# Patient Record
Sex: FEMALE | Race: WHITE | NOT HISPANIC OR LATINO | Employment: UNEMPLOYED | URBAN - METROPOLITAN AREA
[De-identification: names, ages, dates, MRNs, and addresses within clinical notes are randomized per-mention and may not be internally consistent; named-entity substitution may affect disease eponyms.]

---

## 2017-03-07 ENCOUNTER — ALLSCRIPTS OFFICE VISIT (OUTPATIENT)
Dept: OTHER | Facility: OTHER | Age: 27
End: 2017-03-07

## 2018-01-09 NOTE — MISCELLANEOUS
Provider Comments  Provider Comments:   PT WAS A NO SHOW TODAY      Signatures   Electronically signed by : Kay Ba, ; Dec 21 2016  2:26PM EST                       (Author)

## 2018-01-11 NOTE — RESULT NOTES
Verified Results  (1) THIN PREP PAP WITH IMAGING [id number] 12:00AM Gavin Corrales     Test Name Result Flag Reference   LAB AP CASE REPORT (Report)     Gynecologic Cytology Report            Case: [id number]                  Authorizing Provider: Velma Huerta MD     Collected:      12/13/2016           First Screen:     Tony Zamora, CT Received:      12/16/2016 0955        Rescreen:       Maxwell Walsh, [address]                             Pathologist:      Joon Diaz MD                             Specimen:  LIQUID-BASED PAP, SCREENING, Cervix   LAB AP GYN PRIMARY INTERPRETATION      Epithelial cell abnormality  Electronically signed by Joon Diaz MD on 12/20/2016 at 3:47 PM   LAB AP GYN INTERPRETATION      Atypical squamous cells of undetermined significance  Shift in noelle suggestive of bacterial vaginosis   LAB AP GYN SPECIMEN ADEQUACY      Satisfactory for evaluation  Endocervical/transformation zone component present  LAB AP GYN ADDITIONAL INFORMATION (Report)     [employer]'s FDA approved ,  and ThinPrep Imaging System are   utilized with strict adherence to the 's instruction manual to   prepare gynecologic and non-gynecologic cytology specimens for the   production of ThinPrep slides as well as for gynecologic ThinPrep imaging  These processes have been validated by our laboratory and/or by the     The Pap test is not a diagnostic procedure and should not be used as the   sole means to detect cervical cancer  It is only a screening procedure to   aid in the detection of cervical cancer and its precursors  Both   false-negative and false-positive results have been experienced  Your   patient's test result should be interpreted in this context together with   the history and clinical findings    Interpretation performed at [facility], [address] [place] [address]       Discussion/Summary      Borderline Pap smear will repeat in 1 year    Suggest a possible infection or this may represent colonization  If you have any symptoms return to the office  U did not at the time of the Pap smear

## 2018-01-12 NOTE — MISCELLANEOUS
Provider Comments  Provider Comments:   new pt no showed today      Signatures   Electronically signed by : Fabienne Schultz, ; Dec  5 2016  2:31PM EST                       (Author)

## 2018-01-14 VITALS
BODY MASS INDEX: 29.38 KG/M2 | HEIGHT: 65 IN | SYSTOLIC BLOOD PRESSURE: 100 MMHG | DIASTOLIC BLOOD PRESSURE: 62 MMHG | WEIGHT: 176.38 LBS

## 2018-01-17 NOTE — RESULT NOTES
Verified Results  (1) CHLAMYDIA/GC AMPLIFIED DNA, PCR [id number] 12:00AM Katya Luria     Test Name Result Flag Reference   CHLAMYDIA,AMPLIFIED DNA PROBE   C  trachomatis Amplified DNA Negative   C  trachomatis Amplified DNA Negative   N  GONORRHOEAE AMPLIFIED DNA   N  gonorrhoeae Amplified DNA Negative   N  gonorrhoeae Amplified DNA Negative

## 2019-03-12 PROBLEM — Z01.419 ENCOUNTER FOR ANNUAL ROUTINE GYNECOLOGICAL EXAMINATION: Status: ACTIVE | Noted: 2019-03-12

## 2019-03-12 PROBLEM — Z87.42 HISTORY OF ABNORMAL CERVICAL PAPANICOLAOU SMEAR: Status: ACTIVE | Noted: 2019-03-12

## 2019-03-12 PROBLEM — Z12.4 SCREENING FOR CERVICAL CANCER: Status: ACTIVE | Noted: 2019-03-12

## 2019-03-12 NOTE — PROGRESS NOTES
Assessment/Plan:  NGE  BCM- pregnant LMP 1/13, EDC 10/20/19 RTO US  BMI 34- gined 38 #'s since 12/16- alcohol, eating poorly  Depression- never returned 1/17, did not like how she felt on Zoloft or Ovcon 35  Better now  SBE monthly  Pap and Cx's done  Ca 1,000 mg/d with Vit D- has yogurt every day and usually milk  Exercise 3/wk- does         Diagnoses and all orders for this visit:    Encounter for annual routine gynecological examination    Screening for cervical cancer    History of abnormal cervical Papanicolaou smear    Missed period  -     POCT urine HCG    Other orders  -     acetaminophen (TYLENOL) 500 mg tablet; Take 500 mg by mouth every 6 (six) hours as needed for mild pain  -     Prenatal Vit-Fe Fumarate-FA (PRENATAL VITAMIN PO); Take by mouth              Subjective:        Patient ID: Josee Elaine is a 29 y o  female  HPI    The following portions of the patient's history were reviewed and updated as appropriate: She  has a past medical history of GERD (gastroesophageal reflux disease)  Patient Active Problem List    Diagnosis Date Noted    Missed period 03/13/2019    Encounter for annual routine gynecological examination 03/12/2019    History of abnormal cervical Papanicolaou smear 03/12/2019    Screening for cervical cancer 03/12/2019   PMH:   Menarche  G2, P0;[facility] '03, '08  Depression  Obesity (150 at 13, 250 at 23) - Gastric bypass   9/10  Oligomenorrhea  Chlamydia '09   BTB on OC's  Acute Abd Pain, Syncope in the ED, 100 # wt loss- Dx Laparoscopy with Aspiration   L Ovarian Cyst  and Hemoperitoneum blood was up to liver  7/11  Placed on Ortho Evra (never filled due to $), then Nuvaring, and later DPMA (did not like how she felt on this)  She  has a past surgical history that includes Ovarian cyst removal; Appendectomy; and Gastric bypass    Her family history includes Cancer in her maternal grandmother; Heart failure in her maternal grandfather; No Known Problems in her brother, father, mother, paternal grandfather, paternal grandmother, and sister  FH:  MGF- HTN, CAD, CABG, d 80 in his sleep 1/19  MGM- Brain Ca  MGA- Lung Ca     She  reports that she has been smoking  She has never used smokeless tobacco  She reports that she drank alcohol  She reports that she does not use drugs  SH:   Finished phlebotomy school  Engaged, living with her boyfriend of 10/14  Parents were  and her mother moved back home  She smokes 1 cigarette a day over a long period of time  Current Outpatient Medications   Medication Sig Dispense Refill    acetaminophen (TYLENOL) 500 mg tablet Take 500 mg by mouth every 6 (six) hours as needed for mild pain      Prenatal Vit-Fe Fumarate-FA (PRENATAL VITAMIN PO) Take by mouth       No current facility-administered medications for this visit  Current Outpatient Medications on File Prior to Visit   Medication Sig    acetaminophen (TYLENOL) 500 mg tablet Take 500 mg by mouth every 6 (six) hours as needed for mild pain    Prenatal Vit-Fe Fumarate-FA (PRENATAL VITAMIN PO) Take by mouth    [DISCONTINUED] famotidine (PEPCID) 20 mg tablet Take 20 mg by mouth daily  No current facility-administered medications on file prior to visit  She has No Known Allergies       Review of Systems   Constitutional: Negative for activity change, appetite change, fatigue and unexpected weight change  Eyes: Negative for visual disturbance  Respiratory: Negative for cough, chest tightness, shortness of breath and wheezing  Cardiovascular: Negative for chest pain, palpitations and leg swelling  Breast:   Bilateral tenderness, no nipple discharge, masses, or erythema  Gastrointestinal: Positive for constipation  Negative for abdominal distention, abdominal pain, blood in stool, diarrhea, nausea and vomiting  Endocrine: Negative for cold intolerance and heat intolerance     Genitourinary: Negative for decreased urine volume, difficulty urinating, dyspareunia, dysuria, frequency, hematuria, menstrual problem, pelvic pain, urgency, vaginal bleeding, vaginal discharge and vaginal pain  Musculoskeletal: Negative for arthralgias  Skin: Negative for rash  Neurological: Negative for weakness, light-headedness, numbness and headaches  Hematological: Does not bruise/bleed easily  Psychiatric/Behavioral: Negative for agitation, behavioral problems and sleep disturbance  The patient is not nervous/anxious  Objective:    Vitals:    03/13/19 1326   BP: 124/68   BP Location: Right arm   Patient Position: Sitting   Cuff Size: Standard   Pulse: (!) 111   Weight: 92 8 kg (204 lb 9 6 oz)   Height: 5' 5" (1 651 m)            Physical Exam   Constitutional: She is oriented to person, place, and time  She appears well-developed and well-nourished  HENT:   Head: Normocephalic and atraumatic  Eyes: Pupils are equal, round, and reactive to light  Conjunctivae and EOM are normal    Neck: Normal range of motion  Neck supple  No tracheal deviation present  No thyromegaly present  Cardiovascular: Normal rate, regular rhythm and normal heart sounds  No murmur heard  Pulmonary/Chest: Effort normal and breath sounds normal  No respiratory distress  She has no wheezes  Right breast exhibits no inverted nipple, no mass, no nipple discharge, no skin change and no tenderness  Left breast exhibits no inverted nipple, no mass, no nipple discharge, no skin change and no tenderness  No breast tenderness, discharge or bleeding  Breasts are symmetrical    Abdominal: Soft  Bowel sounds are normal  She exhibits no distension and no mass  There is no tenderness  Genitourinary: Vagina normal  Rectal exam shows no external hemorrhoid  No breast tenderness, discharge or bleeding  There is no rash, tenderness or lesion on the right labia  There is no rash, tenderness or lesion on the left labia  Uterus is not deviated, not enlarged and not tender   Cervix exhibits no motion tenderness and no discharge  Right adnexum displays no mass, no tenderness and no fullness  Left adnexum displays no mass, no tenderness and no fullness  Genitourinary Comments: Deep pelvis  Uterus anteverted 8 week size, compatible with LMP  Musculoskeletal: Normal range of motion  Neurological: She is alert and oriented to person, place, and time  Skin: Skin is warm and dry  Psychiatric: She has a normal mood and affect  Her behavior is normal  Judgment and thought content normal    Nursing note and vitals reviewed

## 2019-03-13 ENCOUNTER — ANNUAL EXAM (OUTPATIENT)
Dept: GYNECOLOGY | Facility: CLINIC | Age: 29
End: 2019-03-13
Payer: COMMERCIAL

## 2019-03-13 VITALS
BODY MASS INDEX: 34.09 KG/M2 | SYSTOLIC BLOOD PRESSURE: 124 MMHG | HEIGHT: 65 IN | WEIGHT: 204.6 LBS | HEART RATE: 111 BPM | DIASTOLIC BLOOD PRESSURE: 68 MMHG

## 2019-03-13 DIAGNOSIS — Z01.419 ENCOUNTER FOR ANNUAL ROUTINE GYNECOLOGICAL EXAMINATION: Primary | ICD-10-CM

## 2019-03-13 DIAGNOSIS — N92.6 MISSED PERIOD: ICD-10-CM

## 2019-03-13 DIAGNOSIS — Z87.42 HISTORY OF ABNORMAL CERVICAL PAPANICOLAOU SMEAR: ICD-10-CM

## 2019-03-13 DIAGNOSIS — Z11.3 ROUTINE SCREENING FOR STI (SEXUALLY TRANSMITTED INFECTION): ICD-10-CM

## 2019-03-13 DIAGNOSIS — Z12.4 SCREENING FOR CERVICAL CANCER: ICD-10-CM

## 2019-03-13 LAB — SL AMB POCT URINE HCG: POSITIVE

## 2019-03-13 PROCEDURE — G0145 SCR C/V CYTO,THINLAYER,RESCR: HCPCS | Performed by: PATHOLOGY

## 2019-03-13 PROCEDURE — 99395 PREV VISIT EST AGE 18-39: CPT | Performed by: OBSTETRICS & GYNECOLOGY

## 2019-03-13 PROCEDURE — 87591 N.GONORRHOEAE DNA AMP PROB: CPT | Performed by: OBSTETRICS & GYNECOLOGY

## 2019-03-13 PROCEDURE — 87491 CHLMYD TRACH DNA AMP PROBE: CPT | Performed by: OBSTETRICS & GYNECOLOGY

## 2019-03-13 PROCEDURE — 81025 URINE PREGNANCY TEST: CPT | Performed by: OBSTETRICS & GYNECOLOGY

## 2019-03-13 PROCEDURE — G0124 SCREEN C/V THIN LAYER BY MD: HCPCS | Performed by: PATHOLOGY

## 2019-03-13 RX ORDER — ACETAMINOPHEN 500 MG
500 TABLET ORAL EVERY 6 HOURS PRN
COMMUNITY
End: 2019-10-22 | Stop reason: HOSPADM

## 2019-03-15 LAB
C TRACH DNA SPEC QL NAA+PROBE: NEGATIVE
N GONORRHOEA DNA SPEC QL NAA+PROBE: NEGATIVE

## 2019-03-18 ENCOUNTER — OFFICE VISIT (OUTPATIENT)
Dept: GYNECOLOGY | Facility: CLINIC | Age: 29
End: 2019-03-18
Payer: COMMERCIAL

## 2019-03-18 VITALS
SYSTOLIC BLOOD PRESSURE: 114 MMHG | HEIGHT: 65 IN | BODY MASS INDEX: 34.22 KG/M2 | DIASTOLIC BLOOD PRESSURE: 62 MMHG | WEIGHT: 205.4 LBS

## 2019-03-18 DIAGNOSIS — O36.80X0 PREGNANCY WITH UNCERTAIN FETAL VIABILITY, SINGLE OR UNSPECIFIED FETUS: Primary | ICD-10-CM

## 2019-03-18 PROCEDURE — 76801 OB US < 14 WKS SINGLE FETUS: CPT | Performed by: OBSTETRICS & GYNECOLOGY

## 2019-03-18 NOTE — PROGRESS NOTES
Assessment/Plan:  Scan cw LMP, EDC 10/20/19  L Ovarian cysts  TOSHA 1 wk  PNV's       Diagnoses and all orders for this visit:    Pregnancy with uncertain fetal viability, single or unspecified fetus    BMI 34 0-34 9,adult              Subjective:        Patient ID: Wendy Suarez is a 29 y o  female  HPI    The following portions of the patient's history were reviewed and updated as appropriate: She  has a past medical history of GERD (gastroesophageal reflux disease)  Patient Active Problem List    Diagnosis Date Noted    Pregnancy with uncertain fetal viability 03/18/2019    Missed period 03/13/2019    BMI 34 0-34 9,adult 03/13/2019    Encounter for annual routine gynecological examination 03/12/2019    History of abnormal cervical Papanicolaou smear 03/12/2019    Screening for cervical cancer 03/12/2019     She  has a past surgical history that includes Ovarian cyst removal; Appendectomy; and Gastric bypass  Her family history includes Cancer in her maternal grandmother; Heart failure in her maternal grandfather; No Known Problems in her brother, father, mother, paternal grandfather, paternal grandmother, and sister  She  reports that she has been smoking  She has never used smokeless tobacco  She reports that she drank alcohol  She reports that she does not use drugs  Current Outpatient Medications   Medication Sig Dispense Refill    acetaminophen (TYLENOL) 500 mg tablet Take 500 mg by mouth every 6 (six) hours as needed for mild pain      Prenatal Vit-Fe Fumarate-FA (PRENATAL VITAMIN PO) Take by mouth       No current facility-administered medications for this visit  Current Outpatient Medications on File Prior to Visit   Medication Sig    acetaminophen (TYLENOL) 500 mg tablet Take 500 mg by mouth every 6 (six) hours as needed for mild pain    Prenatal Vit-Fe Fumarate-FA (PRENATAL VITAMIN PO) Take by mouth     No current facility-administered medications on file prior to visit  She has No Known Allergies         Objective:    Vitals:    03/18/19 1339   BP: 114/62   BP Location: Right arm   Patient Position: Sitting   Cuff Size: Standard   Weight: 93 2 kg (205 lb 6 4 oz)   Height: 5' 5" (1 651 m)

## 2019-03-20 LAB
LAB AP GYN PRIMARY INTERPRETATION: ABNORMAL
Lab: ABNORMAL
PATH INTERP SPEC-IMP: ABNORMAL

## 2019-03-21 NOTE — RESULT ENCOUNTER NOTE
Abnormal Pap smear requiring colposcopy  Because of the pregnancy we most likely will not do biopsies  Would take 2 Tylenol beforehand as it can cause some discomfort

## 2019-03-26 ENCOUNTER — LAB (OUTPATIENT)
Dept: LAB | Facility: HOSPITAL | Age: 29
End: 2019-03-26
Payer: COMMERCIAL

## 2019-03-26 ENCOUNTER — INITIAL PRENATAL (OUTPATIENT)
Dept: GYNECOLOGY | Facility: CLINIC | Age: 29
End: 2019-03-26

## 2019-03-26 VITALS
DIASTOLIC BLOOD PRESSURE: 68 MMHG | HEIGHT: 65 IN | BODY MASS INDEX: 34.32 KG/M2 | WEIGHT: 206 LBS | SYSTOLIC BLOOD PRESSURE: 110 MMHG

## 2019-03-26 DIAGNOSIS — Z34.81 SUPERVISION OF NORMAL INTRAUTERINE PREGNANCY IN MULTIGRAVIDA, FIRST TRIMESTER: Primary | ICD-10-CM

## 2019-03-26 DIAGNOSIS — Z34.81 SUPERVISION OF NORMAL INTRAUTERINE PREGNANCY IN MULTIGRAVIDA, FIRST TRIMESTER: ICD-10-CM

## 2019-03-26 LAB
ABO GROUP BLD: NORMAL
BASOPHILS # BLD AUTO: 0.07 THOUSANDS/ΜL (ref 0–0.1)
BASOPHILS NFR BLD AUTO: 1 % (ref 0–1)
BILIRUB UR QL STRIP: NEGATIVE
BLD GP AB SCN SERPL QL: NEGATIVE
CLARITY UR: CLEAR
COLOR UR: YELLOW
EOSINOPHIL # BLD AUTO: 0.13 THOUSAND/ΜL (ref 0–0.61)
EOSINOPHIL NFR BLD AUTO: 1 % (ref 0–6)
ERYTHROCYTE [DISTWIDTH] IN BLOOD BY AUTOMATED COUNT: 17.1 % (ref 11.6–15.1)
GLUCOSE UR STRIP-MCNC: NEGATIVE MG/DL
HCT VFR BLD AUTO: 31.7 % (ref 34.8–46.1)
HGB BLD-MCNC: 9.6 G/DL (ref 11.5–15.4)
HGB UR QL STRIP.AUTO: NEGATIVE
IMM GRANULOCYTES # BLD AUTO: 0.07 THOUSAND/UL (ref 0–0.2)
IMM GRANULOCYTES NFR BLD AUTO: 1 % (ref 0–2)
KETONES UR STRIP-MCNC: NEGATIVE MG/DL
LEUKOCYTE ESTERASE UR QL STRIP: NEGATIVE
LYMPHOCYTES # BLD AUTO: 2.9 THOUSANDS/ΜL (ref 0.6–4.47)
LYMPHOCYTES NFR BLD AUTO: 19 % (ref 14–44)
MCH RBC QN AUTO: 23 PG (ref 26.8–34.3)
MCHC RBC AUTO-ENTMCNC: 30.3 G/DL (ref 31.4–37.4)
MCV RBC AUTO: 76 FL (ref 82–98)
MONOCYTES # BLD AUTO: 0.91 THOUSAND/ΜL (ref 0.17–1.22)
MONOCYTES NFR BLD AUTO: 6 % (ref 4–12)
NEUTROPHILS # BLD AUTO: 11.43 THOUSANDS/ΜL (ref 1.85–7.62)
NEUTS SEG NFR BLD AUTO: 72 % (ref 43–75)
NITRITE UR QL STRIP: NEGATIVE
NRBC BLD AUTO-RTO: 0 /100 WBCS
PH UR STRIP.AUTO: 5.5 [PH]
PLATELET # BLD AUTO: 369 THOUSANDS/UL (ref 149–390)
PMV BLD AUTO: 9.7 FL (ref 8.9–12.7)
PROT UR STRIP-MCNC: NEGATIVE MG/DL
RBC # BLD AUTO: 4.17 MILLION/UL (ref 3.81–5.12)
RH BLD: NEGATIVE
RUBV IGG SERPL IA-ACNC: 40.5 IU/ML
SP GR UR STRIP.AUTO: 1.03 (ref 1–1.03)
SPECIMEN EXPIRATION DATE: NORMAL
UROBILINOGEN UR QL STRIP.AUTO: 0.2 E.U./DL
WBC # BLD AUTO: 15.51 THOUSAND/UL (ref 4.31–10.16)

## 2019-03-26 PROCEDURE — 36415 COLL VENOUS BLD VENIPUNCTURE: CPT

## 2019-03-26 PROCEDURE — 87086 URINE CULTURE/COLONY COUNT: CPT

## 2019-03-26 PROCEDURE — OBC: Performed by: NURSE PRACTITIONER

## 2019-03-26 PROCEDURE — 83020 HEMOGLOBIN ELECTROPHORESIS: CPT

## 2019-03-26 PROCEDURE — 80081 OBSTETRIC PANEL INC HIV TSTG: CPT

## 2019-03-26 PROCEDURE — 81003 URINALYSIS AUTO W/O SCOPE: CPT

## 2019-03-26 NOTE — PROGRESS NOTES
HIV and other prenatal tests : Discussed  Risk factors identified by prenatal history: Discussed  Anticipated course of prenatal care: Discussed  Nutrition counseling; special diet, dietary precautions (mercury, listeriosis): Discussed  Weight gain counseling: Discussed  Toxoplasmosis precautions (cats/raw meat/unwashed vegetables): Discussed  Sexual activity: Discussed  Exercise: Discussed  Influenza vaccine: Discussed  Dental care: Discussed  Environmental/work hazards : Discussed  Avoidance of sauna or hot tubs: Discussed  Teratogens: Discussed  Travel-avoid travel where risk of congenitally transmitted infection(s) is high: Discussed  Tobacco/smoking cessation counseling (ask, advise, assess, assist and arrange): Discussed  Alcohol, illicit/recreational drugs: Discussed  Breastfeeding: Discussed  Screening for aneuploidy: Discussed  Use of any medications (including supplements, vitamins, herbs or OTC drugs): Discussed  Indications for ultrasonography: Discussed  Intimate partner violence: Discussed  Seat belt use: Discussed  Childbirth classes/hospital facilities: Discussed  No history of MRSA  Not employed   Had chicken pox as a child  Declines Cystic Fibrosis screening

## 2019-03-27 PROBLEM — Z3A.10 10 WEEKS GESTATION OF PREGNANCY: Status: ACTIVE | Noted: 2019-03-27

## 2019-03-27 PROBLEM — O36.80X0 PREGNANCY WITH UNCERTAIN FETAL VIABILITY: Status: RESOLVED | Noted: 2019-03-18 | Resolved: 2019-03-27

## 2019-03-27 PROBLEM — N92.6 MISSED PERIOD: Status: RESOLVED | Noted: 2019-03-13 | Resolved: 2019-03-27

## 2019-03-27 PROBLEM — R87.611 PAP SMEAR OF CERVIX WITH ASCUS, CANNOT EXCLUDE HGSIL: Status: ACTIVE | Noted: 2019-03-27

## 2019-03-27 LAB
BACTERIA UR CULT: NORMAL
HBV SURFACE AG SER QL: NORMAL
RPR SER QL: NORMAL

## 2019-03-27 NOTE — PROGRESS NOTES
Assessment/Plan:  Colposcopy compatible with LGI L  Recolposcope with biopsies after delivery  RTO 4 wks  Has referral for Sequential screen  Diagnoses and all orders for this visit:    Pap smear of cervix with ASCUS, cannot exclude HGSIL    History of abnormal cervical Papanicolaou smear    10 weeks gestation of pregnancy    BMI 34 0-34 9,adult    Other orders  -     ferrous gluconate (FERGON) 324 mg tablet; Take 324 mg by mouth daily with breakfast              Subjective:        Patient ID: Bianca Russ is a 29 y o  female  The following portions of the patient's history were reviewed and updated as appropriate: She  has a past medical history of GERD (gastroesophageal reflux disease)  Patient Active Problem List    Diagnosis Date Noted    10 weeks gestation of pregnancy 03/27/2019    Pap smear of cervix with ASCUS, cannot exclude HGSIL 03/27/2019    BMI 34 0-34 9,adult 03/13/2019    Encounter for annual routine gynecological examination 03/12/2019    History of abnormal cervical Papanicolaou smear 03/12/2019    Screening for cervical cancer 03/12/2019     She  has a past surgical history that includes Ovarian cyst removal; Appendectomy; and Gastric bypass  Her family history includes Cancer in her maternal grandmother; Heart failure in her maternal grandfather; No Known Problems in her brother, father, mother, paternal grandfather, paternal grandmother, and sister  She  reports that she has quit smoking  She has never used smokeless tobacco  She reports that she drank alcohol  She reports that she does not use drugs    Current Outpatient Medications   Medication Sig Dispense Refill    acetaminophen (TYLENOL) 500 mg tablet Take 500 mg by mouth every 6 (six) hours as needed for mild pain      ferrous gluconate (FERGON) 324 mg tablet Take 324 mg by mouth daily with breakfast      Prenatal Vit-Fe Fumarate-FA (PRENATAL VITAMIN PO) Take by mouth       No current facility-administered medications for this visit  Current Outpatient Medications on File Prior to Visit   Medication Sig    acetaminophen (TYLENOL) 500 mg tablet Take 500 mg by mouth every 6 (six) hours as needed for mild pain    ferrous gluconate (FERGON) 324 mg tablet Take 324 mg by mouth daily with breakfast    Prenatal Vit-Fe Fumarate-FA (PRENATAL VITAMIN PO) Take by mouth     No current facility-administered medications on file prior to visit  She has No Known Allergies           Objective:    Vitals:    03/28/19 1345   BP: 122/76   BP Location: Right arm   Patient Position: Sitting   Cuff Size: Standard   Pulse: (!) 116   Weight: 93 5 kg (206 lb 3 2 oz)   Height: 5' 5" (1 651 m)     Colposcopy  Date/Time: 3/28/2019 2:51 PM  Performed by: Nader Cleveland MD  Authorized by: Nader Cleveland MD     Consent:     Consent obtained:  Verbal and written    Consent given by:  Patient    Procedural risks discussed:  Repeat procedure    Patient questions answered: yes      Patient agrees, verbalizes understanding, and wants to proceed: yes    Pre-procedure:     Pre-procedure timeout performed: yes      Prepped with: acetic acid    Indication:     Indication:  ASC-H  Procedure:     Procedure: Colposcopy only      Under satisfactory analgesia the patient was prepped and draped in the dorsal lithotomy position: yes      [place] speculum was placed in the vagina: yes      Under colposcopic examination the transition zone was seen in entirety: yes      Biopsy(s): no      Specimen to pathology: no    Post-procedure:     Findings: White epithelium      Impression: Low grade cervical dysplasia    Comments:      Patient had an ASCUS Pap smear in December of 2016 and now ASCUS cannot exclude HSIL  Colposcopy compatible with mild to moderate cervical dysplasia  Discussed how this may be sloughed off during vaginal delivery  Would plan on having a colposcopy postpartum

## 2019-03-27 NOTE — RESULT ENCOUNTER NOTE
Some of prenatal labs are back- most her normal but your anemic with a hemoglobin of 9 6  Please take your prenatal vitamins daily make sure they have iron in them  Then at least 8 hours later taken over-the-counter iron tablet with a glass of orange juice

## 2019-03-28 ENCOUNTER — PROCEDURE VISIT (OUTPATIENT)
Dept: GYNECOLOGY | Facility: CLINIC | Age: 29
End: 2019-03-28
Payer: COMMERCIAL

## 2019-03-28 VITALS
HEART RATE: 116 BPM | WEIGHT: 206.2 LBS | HEIGHT: 65 IN | BODY MASS INDEX: 34.35 KG/M2 | DIASTOLIC BLOOD PRESSURE: 76 MMHG | SYSTOLIC BLOOD PRESSURE: 122 MMHG

## 2019-03-28 DIAGNOSIS — R87.611 PAP SMEAR OF CERVIX WITH ASCUS, CANNOT EXCLUDE HGSIL: Primary | ICD-10-CM

## 2019-03-28 DIAGNOSIS — Z3A.10 10 WEEKS GESTATION OF PREGNANCY: ICD-10-CM

## 2019-03-28 DIAGNOSIS — Z87.42 HISTORY OF ABNORMAL CERVICAL PAPANICOLAOU SMEAR: ICD-10-CM

## 2019-03-28 LAB
DEPRECATED HGB OTHER BLD-IMP: 0 %
HGB A MFR BLD: 1.6 % (ref 1.8–3.2)
HGB A MFR BLD: 98.4 % (ref 96.4–98.8)
HGB C MFR BLD: 0 %
HGB F MFR BLD: 0 % (ref 0–2)
HGB FRACT BLD-IMP: ABNORMAL
HGB S BLD QL SOLY: NEGATIVE
HGB S MFR BLD: 0 %
HIV 1+2 AB+HIV1 P24 AG SERPL QL IA: NORMAL

## 2019-03-28 PROCEDURE — 57452 EXAM OF CERVIX W/SCOPE: CPT | Performed by: OBSTETRICS & GYNECOLOGY

## 2019-03-28 RX ORDER — DOXYCYCLINE HYCLATE 50 MG/1
324 CAPSULE, GELATIN COATED ORAL
COMMUNITY
End: 2020-05-19

## 2019-03-28 NOTE — RESULT ENCOUNTER NOTE
Hemoglobin 9 6 which is very low  Please make sure you are taking a prenatal vitamin with iron in it  Also add an iron tablet with a glass of orange juice 8 hours later for the remainder of the pregnancy

## 2019-04-01 NOTE — RESULT ENCOUNTER NOTE
The hemoglobin electrophoresis came back abnormal due to the fact that you have iron deficiency anemia  It is very important to take your prenatal vitamin that has iron as well as a separate iron tablet at least 8 hours later  A glass of orange juice will help absorb it

## 2019-04-08 ENCOUNTER — TELEPHONE (OUTPATIENT)
Dept: GYNECOLOGY | Facility: CLINIC | Age: 29
End: 2019-04-08

## 2019-04-17 ENCOUNTER — ROUTINE PRENATAL (OUTPATIENT)
Dept: PERINATAL CARE | Facility: CLINIC | Age: 29
End: 2019-04-17
Payer: COMMERCIAL

## 2019-04-17 VITALS
WEIGHT: 203 LBS | BODY MASS INDEX: 33.82 KG/M2 | DIASTOLIC BLOOD PRESSURE: 77 MMHG | SYSTOLIC BLOOD PRESSURE: 119 MMHG | HEART RATE: 108 BPM | HEIGHT: 65 IN

## 2019-04-17 DIAGNOSIS — Z34.81 SUPERVISION OF NORMAL INTRAUTERINE PREGNANCY IN MULTIGRAVIDA, FIRST TRIMESTER: ICD-10-CM

## 2019-04-17 DIAGNOSIS — O99.210 OBESITY AFFECTING PREGNANCY, ANTEPARTUM: ICD-10-CM

## 2019-04-17 DIAGNOSIS — Z36.82 ENCOUNTER FOR NUCHAL TRANSLUCENCY TESTING: Primary | ICD-10-CM

## 2019-04-17 DIAGNOSIS — Z3A.13 13 WEEKS GESTATION OF PREGNANCY: ICD-10-CM

## 2019-04-17 PROCEDURE — 99241 PR OFFICE CONSULTATION NEW/ESTAB PATIENT 15 MIN: CPT | Performed by: OBSTETRICS & GYNECOLOGY

## 2019-04-17 PROCEDURE — 76813 OB US NUCHAL MEAS 1 GEST: CPT | Performed by: OBSTETRICS & GYNECOLOGY

## 2019-04-17 RX ORDER — ASPIRIN 81 MG/1
162 TABLET ORAL DAILY
Qty: 180 TABLET | Refills: 3 | Status: SHIPPED | OUTPATIENT
Start: 2019-04-17 | End: 2019-11-11 | Stop reason: ALTCHOICE

## 2019-04-29 ENCOUNTER — ROUTINE PRENATAL (OUTPATIENT)
Dept: GYNECOLOGY | Facility: CLINIC | Age: 29
End: 2019-04-29
Payer: COMMERCIAL

## 2019-04-29 VITALS
BODY MASS INDEX: 34.55 KG/M2 | WEIGHT: 207.4 LBS | SYSTOLIC BLOOD PRESSURE: 120 MMHG | HEART RATE: 110 BPM | DIASTOLIC BLOOD PRESSURE: 70 MMHG | HEIGHT: 65 IN

## 2019-04-29 DIAGNOSIS — O99.012 ANEMIA DURING PREGNANCY IN SECOND TRIMESTER: ICD-10-CM

## 2019-04-29 DIAGNOSIS — Z3A.15 15 WEEKS GESTATION OF PREGNANCY: Primary | ICD-10-CM

## 2019-04-29 DIAGNOSIS — Z23 NEED FOR INFLUENZA VACCINATION: ICD-10-CM

## 2019-04-29 PROCEDURE — 99214 OFFICE O/P EST MOD 30 MIN: CPT | Performed by: NURSE PRACTITIONER

## 2019-04-29 PROCEDURE — 90471 IMMUNIZATION ADMIN: CPT | Performed by: NURSE PRACTITIONER

## 2019-04-29 PROCEDURE — 90686 IIV4 VACC NO PRSV 0.5 ML IM: CPT | Performed by: NURSE PRACTITIONER

## 2019-05-13 ENCOUNTER — APPOINTMENT (OUTPATIENT)
Dept: LAB | Facility: HOSPITAL | Age: 29
End: 2019-05-13
Payer: COMMERCIAL

## 2019-05-13 DIAGNOSIS — O99.012 ANEMIA DURING PREGNANCY IN SECOND TRIMESTER: ICD-10-CM

## 2019-05-13 DIAGNOSIS — Z3A.15 15 WEEKS GESTATION OF PREGNANCY: ICD-10-CM

## 2019-05-13 LAB
BASOPHILS # BLD AUTO: 0.04 THOUSANDS/ΜL (ref 0–0.1)
BASOPHILS NFR BLD AUTO: 0 % (ref 0–1)
EOSINOPHIL # BLD AUTO: 0.1 THOUSAND/ΜL (ref 0–0.61)
EOSINOPHIL NFR BLD AUTO: 1 % (ref 0–6)
ERYTHROCYTE [DISTWIDTH] IN BLOOD BY AUTOMATED COUNT: 19.3 % (ref 11.6–15.1)
HCT VFR BLD AUTO: 32 % (ref 34.8–46.1)
HGB BLD-MCNC: 9.9 G/DL (ref 11.5–15.4)
IMM GRANULOCYTES # BLD AUTO: 0.09 THOUSAND/UL (ref 0–0.2)
IMM GRANULOCYTES NFR BLD AUTO: 1 % (ref 0–2)
LYMPHOCYTES # BLD AUTO: 2.19 THOUSANDS/ΜL (ref 0.6–4.47)
LYMPHOCYTES NFR BLD AUTO: 18 % (ref 14–44)
MCH RBC QN AUTO: 24.8 PG (ref 26.8–34.3)
MCHC RBC AUTO-ENTMCNC: 30.9 G/DL (ref 31.4–37.4)
MCV RBC AUTO: 80 FL (ref 82–98)
MONOCYTES # BLD AUTO: 0.75 THOUSAND/ΜL (ref 0.17–1.22)
MONOCYTES NFR BLD AUTO: 6 % (ref 4–12)
NEUTROPHILS # BLD AUTO: 8.85 THOUSANDS/ΜL (ref 1.85–7.62)
NEUTS SEG NFR BLD AUTO: 74 % (ref 43–75)
NRBC BLD AUTO-RTO: 0 /100 WBCS
PLATELET # BLD AUTO: 227 THOUSANDS/UL (ref 149–390)
PMV BLD AUTO: 10 FL (ref 8.9–12.7)
RBC # BLD AUTO: 3.99 MILLION/UL (ref 3.81–5.12)
WBC # BLD AUTO: 12.02 THOUSAND/UL (ref 4.31–10.16)

## 2019-05-13 PROCEDURE — 85025 COMPLETE CBC W/AUTO DIFF WBC: CPT

## 2019-05-13 PROCEDURE — 36415 COLL VENOUS BLD VENIPUNCTURE: CPT

## 2019-05-29 PROBLEM — O09.92 SUPERVISION OF HIGH RISK PREGNANCY, ANTEPARTUM, SECOND TRIMESTER: Status: ACTIVE | Noted: 2019-05-29

## 2019-05-29 PROBLEM — Z98.84 HISTORY OF BARIATRIC SURGERY: Status: ACTIVE | Noted: 2019-05-29

## 2019-05-30 ENCOUNTER — ROUTINE PRENATAL (OUTPATIENT)
Dept: GYNECOLOGY | Facility: CLINIC | Age: 29
End: 2019-05-30
Payer: COMMERCIAL

## 2019-05-30 VITALS
HEART RATE: 86 BPM | WEIGHT: 214.2 LBS | SYSTOLIC BLOOD PRESSURE: 116 MMHG | BODY MASS INDEX: 35.64 KG/M2 | DIASTOLIC BLOOD PRESSURE: 70 MMHG

## 2019-05-30 DIAGNOSIS — O99.012 ANEMIA DURING PREGNANCY IN SECOND TRIMESTER: ICD-10-CM

## 2019-05-30 DIAGNOSIS — Z98.84 HISTORY OF BARIATRIC SURGERY: ICD-10-CM

## 2019-05-30 DIAGNOSIS — O09.92 SUPERVISION OF HIGH RISK PREGNANCY, ANTEPARTUM, SECOND TRIMESTER: Primary | ICD-10-CM

## 2019-05-30 PROCEDURE — 99213 OFFICE O/P EST LOW 20 MIN: CPT | Performed by: OBSTETRICS & GYNECOLOGY

## 2019-06-04 PROBLEM — Z3A.13 13 WEEKS GESTATION OF PREGNANCY: Status: RESOLVED | Noted: 2019-03-27 | Resolved: 2019-06-04

## 2019-06-05 ENCOUNTER — ROUTINE PRENATAL (OUTPATIENT)
Dept: PERINATAL CARE | Facility: CLINIC | Age: 29
End: 2019-06-05
Payer: COMMERCIAL

## 2019-06-05 VITALS
WEIGHT: 212.4 LBS | HEART RATE: 93 BPM | DIASTOLIC BLOOD PRESSURE: 67 MMHG | SYSTOLIC BLOOD PRESSURE: 98 MMHG | BODY MASS INDEX: 35.35 KG/M2

## 2019-06-05 DIAGNOSIS — Z3A.20 20 WEEKS GESTATION OF PREGNANCY: ICD-10-CM

## 2019-06-05 DIAGNOSIS — Z36.86 ENCOUNTER FOR ANTENATAL SCREENING FOR CERVICAL LENGTH: ICD-10-CM

## 2019-06-05 DIAGNOSIS — Z36.3 ENCOUNTER FOR ANTENATAL SCREENING FOR MALFORMATIONS: ICD-10-CM

## 2019-06-05 DIAGNOSIS — O99.840 PREVIOUS BARIATRIC SURGERY AFFECTING PREGNANCY, ANTEPARTUM: ICD-10-CM

## 2019-06-05 DIAGNOSIS — O99.210 OBESITY AFFECTING PREGNANCY, ANTEPARTUM: Primary | ICD-10-CM

## 2019-06-05 PROBLEM — Z12.4 SCREENING FOR CERVICAL CANCER: Status: RESOLVED | Noted: 2019-03-12 | Resolved: 2019-06-05

## 2019-06-05 PROBLEM — Z01.419 ENCOUNTER FOR ANNUAL ROUTINE GYNECOLOGICAL EXAMINATION: Status: RESOLVED | Noted: 2019-03-12 | Resolved: 2019-06-05

## 2019-06-05 PROCEDURE — 76817 TRANSVAGINAL US OBSTETRIC: CPT | Performed by: OBSTETRICS & GYNECOLOGY

## 2019-06-05 PROCEDURE — 76811 OB US DETAILED SNGL FETUS: CPT | Performed by: OBSTETRICS & GYNECOLOGY

## 2019-06-30 PROBLEM — Z3A.24 24 WEEKS GESTATION OF PREGNANCY: Status: RESOLVED | Noted: 2019-03-27 | Resolved: 2019-06-04

## 2019-07-01 ENCOUNTER — ROUTINE PRENATAL (OUTPATIENT)
Dept: GYNECOLOGY | Facility: CLINIC | Age: 29
End: 2019-07-01
Payer: COMMERCIAL

## 2019-07-01 VITALS
HEIGHT: 65 IN | HEART RATE: 108 BPM | SYSTOLIC BLOOD PRESSURE: 112 MMHG | WEIGHT: 217.6 LBS | BODY MASS INDEX: 36.25 KG/M2 | DIASTOLIC BLOOD PRESSURE: 68 MMHG

## 2019-07-01 DIAGNOSIS — O09.92 SUPERVISION OF HIGH RISK PREGNANCY, ANTEPARTUM, SECOND TRIMESTER: Primary | ICD-10-CM

## 2019-07-01 DIAGNOSIS — Z3A.24 24 WEEKS GESTATION OF PREGNANCY: ICD-10-CM

## 2019-07-01 DIAGNOSIS — Z98.84 HISTORY OF BARIATRIC SURGERY: ICD-10-CM

## 2019-07-01 DIAGNOSIS — D64.9 ANEMIA, UNSPECIFIED TYPE: ICD-10-CM

## 2019-07-01 PROCEDURE — 99214 OFFICE O/P EST MOD 30 MIN: CPT | Performed by: NURSE PRACTITIONER

## 2019-07-01 NOTE — PROGRESS NOTES
Here for OB follow up visit  Just found out this month she is having a girl "Stephanie"  Negative depression screen  FKC's to start at 28 weeks  TDAP and rhogam  at next visit   mg daily  Referred to hematology for H/H of 9 6/31 7 on 3/26 and 9 9/32 on 5/13    MFM appt in one month; will follow up with MFM for glucose monitoring as she has hx of gastric bypass

## 2019-07-28 PROBLEM — Z3A.28 28 WEEKS GESTATION OF PREGNANCY: Status: ACTIVE | Noted: 2019-07-28

## 2019-07-29 ENCOUNTER — ROUTINE PRENATAL (OUTPATIENT)
Dept: GYNECOLOGY | Facility: CLINIC | Age: 29
End: 2019-07-29
Payer: COMMERCIAL

## 2019-07-29 VITALS
DIASTOLIC BLOOD PRESSURE: 62 MMHG | BODY MASS INDEX: 36.75 KG/M2 | HEIGHT: 65 IN | WEIGHT: 220.6 LBS | SYSTOLIC BLOOD PRESSURE: 112 MMHG

## 2019-07-29 DIAGNOSIS — D64.9 ANEMIA, UNSPECIFIED TYPE: ICD-10-CM

## 2019-07-29 DIAGNOSIS — Z3A.28 28 WEEKS GESTATION OF PREGNANCY: ICD-10-CM

## 2019-07-29 DIAGNOSIS — Z98.84 HISTORY OF BARIATRIC SURGERY: ICD-10-CM

## 2019-07-29 DIAGNOSIS — O09.92 SUPERVISION OF HIGH RISK PREGNANCY, ANTEPARTUM, SECOND TRIMESTER: Primary | ICD-10-CM

## 2019-07-29 PROCEDURE — 96372 THER/PROPH/DIAG INJ SC/IM: CPT | Performed by: OBSTETRICS & GYNECOLOGY

## 2019-07-29 PROCEDURE — 99213 OFFICE O/P EST LOW 20 MIN: CPT | Performed by: OBSTETRICS & GYNECOLOGY

## 2019-07-29 NOTE — PROGRESS NOTES
Level II 6/5 nl but incomplete; repeat 8 wks for growth/completed anatomic survey  ASA  MFM 7/31  Rhogam given  Tdap next visit  Twenty-eight week labs, glucometer for diabetes screening due to gastric bypass  Discussed my health, the need for Leeanne Roblero's to make a medical accommodation, and the decision I must stop obstetrics as of 8/5  I apologized for how abrupt this notification is but these decisions are out of my control  For the time being [facility] will be delivering my patients after that time  For the past 3 years we shared a night and weekend call arrangement   This will continue was usual   Appt made hood WEI in 2 wks

## 2019-07-31 ENCOUNTER — OFFICE VISIT (OUTPATIENT)
Dept: PERINATAL CARE | Facility: CLINIC | Age: 29
End: 2019-07-31
Payer: COMMERCIAL

## 2019-07-31 ENCOUNTER — ULTRASOUND (OUTPATIENT)
Dept: PERINATAL CARE | Facility: CLINIC | Age: 29
End: 2019-07-31
Payer: COMMERCIAL

## 2019-07-31 VITALS
SYSTOLIC BLOOD PRESSURE: 102 MMHG | BODY MASS INDEX: 36.82 KG/M2 | WEIGHT: 221 LBS | HEIGHT: 65 IN | DIASTOLIC BLOOD PRESSURE: 76 MMHG | HEART RATE: 118 BPM

## 2019-07-31 DIAGNOSIS — Z3A.28 28 WEEKS GESTATION OF PREGNANCY: Primary | ICD-10-CM

## 2019-07-31 DIAGNOSIS — O99.213 OBESITY AFFECTING PREGNANCY IN THIRD TRIMESTER: ICD-10-CM

## 2019-07-31 DIAGNOSIS — O99.013 ANEMIA DURING PREGNANCY IN THIRD TRIMESTER: ICD-10-CM

## 2019-07-31 DIAGNOSIS — O99.843 PREGNANCY AFFECTED BY PREVIOUS BARIATRIC SURGERY, CURRENTLY IN THIRD TRIMESTER: ICD-10-CM

## 2019-07-31 DIAGNOSIS — Z98.84 HISTORY OF GASTRIC BYPASS: Primary | ICD-10-CM

## 2019-07-31 PROCEDURE — G0108 DIAB MANAGE TRN  PER INDIV: HCPCS

## 2019-07-31 PROCEDURE — 76816 OB US FOLLOW-UP PER FETUS: CPT | Performed by: OBSTETRICS & GYNECOLOGY

## 2019-07-31 PROCEDURE — 99212 OFFICE O/P EST SF 10 MIN: CPT | Performed by: OBSTETRICS & GYNECOLOGY

## 2019-07-31 RX ORDER — BLOOD-GLUCOSE METER
KIT MISCELLANEOUS
Qty: 1 EACH | Refills: 0 | Status: SHIPPED | OUTPATIENT
Start: 2019-07-31 | End: 2019-09-06

## 2019-07-31 RX ORDER — LANCETS 28 GAUGE
EACH MISCELLANEOUS
Qty: 100 EACH | Refills: 0 | Status: SHIPPED | OUTPATIENT
Start: 2019-07-31 | End: 2019-09-06

## 2019-07-31 NOTE — PROGRESS NOTES
DATE: 19   RE: Rossi Curry   : 1990  PATSY: Estimated Date of Delivery: 10/20/19  EGA:  Michael Julien  OBGYN: Karlo Portillo    Thank you for referring your patient to the [facility], [facility]  at [address]  The patient received the following education on blood glucose monitoring during pregnancy:       Self-monitoring of blood glucose levels: fasting (goal 60mg/dl to 90mg/dl) and two hours after the start of the meal less (goal less than 120mg/dl)  The patient was provided with a prescription for FreeStyle blood glucose meter and supplies to test BG for 1 week related to history of gastric bypass surgery   Report blood glucose levels to [address] on (DATE)  o Phone: [phone]  If no response in 24 hours, call [phone]   o Fax: [phone]  o Email: [email or web address]   Follow up: Thursday, 19 to report blood sugars    Thank you for the opportunity to participate in the care of this patient  I can be reached at [phone] should you have any questions  Time spent with patient [id number]; time spent face to face counseling greater than 50% of the appointment      Sincerely,     Justin Persaud RN BS CDE  Diabetes Educator  Diabetes and Pregnancy Program

## 2019-07-31 NOTE — LETTER
July 31, 2019     Corneliocyndi Javier[facility] [address] [facility] [address]    Patient: Gabi London   YOB: 1990   Date of Visit: 7/31/2019       Dear Dr Nelly Burgess:    Thank you for referring Jossue Rizo to me for evaluation  Below are my notes for this consultation  If you have questions, please do not hesitate to call me  I look forward to following your patient along with you  Sincerely,        Ramona Yost MD        CC: No Recipients  Ramona Yost MD  7/31/2019  2:56 PM  Sign at close encounter    Please refer to the [facility] ultrasound report in Ob Procedures for additional information regarding the visit to the [facility]  today    Ramona Yost MD

## 2019-07-31 NOTE — PROGRESS NOTES
Please refer to the [facility] ultrasound report in Ob Procedures for additional information regarding the visit to the [facility]  today    Elizabeth Javier MD

## 2019-08-12 ENCOUNTER — ROUTINE PRENATAL (OUTPATIENT)
Dept: OBGYN CLINIC | Facility: CLINIC | Age: 29
End: 2019-08-12
Payer: COMMERCIAL

## 2019-08-12 VITALS — BODY MASS INDEX: 36.44 KG/M2 | WEIGHT: 219 LBS | DIASTOLIC BLOOD PRESSURE: 70 MMHG | SYSTOLIC BLOOD PRESSURE: 120 MMHG

## 2019-08-12 DIAGNOSIS — Z23 NEED FOR TDAP VACCINATION: ICD-10-CM

## 2019-08-12 DIAGNOSIS — O09.93 SUPERVISION OF HIGH RISK PREGNANCY IN THIRD TRIMESTER: Primary | ICD-10-CM

## 2019-08-12 DIAGNOSIS — O99.013 ANEMIA DURING PREGNANCY IN THIRD TRIMESTER: ICD-10-CM

## 2019-08-12 DIAGNOSIS — Z3A.30 30 WEEKS GESTATION OF PREGNANCY: ICD-10-CM

## 2019-08-12 DIAGNOSIS — O99.210 MATERNAL OBESITY AFFECTING PREGNANCY, ANTEPARTUM: ICD-10-CM

## 2019-08-12 DIAGNOSIS — Z34.93 ENCOUNTER FOR PREGNANCY RELATED EXAMINATION IN THIRD TRIMESTER: ICD-10-CM

## 2019-08-12 DIAGNOSIS — O99.843 PREGNANCY AFFECTED BY PREVIOUS BARIATRIC SURGERY, CURRENTLY IN THIRD TRIMESTER: ICD-10-CM

## 2019-08-12 DIAGNOSIS — R87.611 PAP SMEAR OF CERVIX WITH ASCUS, CANNOT EXCLUDE HGSIL: ICD-10-CM

## 2019-08-12 PROBLEM — O26.899 RH NEGATIVE STATE IN ANTEPARTUM PERIOD: Status: ACTIVE | Noted: 2019-08-12

## 2019-08-12 PROBLEM — Z67.91 RH NEGATIVE STATE IN ANTEPARTUM PERIOD: Status: ACTIVE | Noted: 2019-08-12

## 2019-08-12 LAB
SL AMB  POCT GLUCOSE, UA: NORMAL
SL AMB POCT URINE PROTEIN: NEGATIVE

## 2019-08-12 PROCEDURE — 90471 IMMUNIZATION ADMIN: CPT | Performed by: NURSE PRACTITIONER

## 2019-08-12 PROCEDURE — 99215 OFFICE O/P EST HI 40 MIN: CPT | Performed by: NURSE PRACTITIONER

## 2019-08-12 PROCEDURE — 90715 TDAP VACCINE 7 YRS/> IM: CPT | Performed by: NURSE PRACTITIONER

## 2019-08-12 NOTE — ASSESSMENT & PLAN NOTE
1st trimester pap with LSIL, cannot exclude HSIL  Colpo note reports findings are "compatible with mild to mod cervical dysplasia"  No bx were collected  Re-colpo in the postpartum phase was recommended   The patient is aware of plan

## 2019-08-12 NOTE — ASSESSMENT & PLAN NOTE
OB transfer from Dr Zenaida Rich practice  Reviewed SLO[facility] set up, policies and contact information  Denies OB complaints  Good fetal movement  Denies contractions, cramping, leakage of fluid or vaginal bleeding  Baby girl - Lino Goodwin  Tdap administered today vaccine  Baby and Me considerations reinforced  Reviewed  labor precautions and FKCs

## 2019-08-12 NOTE — PROGRESS NOTES
Problem List Items Addressed This Visit        Other    Pap smear of cervix with ASCUS, cannot exclude HGSIL     1st trimester pap with LSIL, cannot exclude HSIL  Colpo note reports findings are "compatible with mild to mod cervical dysplasia"  No bx were collected  Re-colpo in the postpartum phase was recommended  The patient is aware of plan          Anemia during pregnancy in third trimester     Most recent hgb 9 9  Sabrina Keys reports she is scheduled to see Heme later this week  The patient reports she is tolerating PO supplements and has tried to increase intake of Fe-rich foods  Discussed risks of maternal anemia for both mother and baby, and reviewed benefits of Venofer  We discussed effects of Lj-en-Y on absorption of Fe and she was advised to keep Heme consult as scheduled  Advised completion of repeat CBC and Fe studies prior to this appt  She agrees with plan  Relevant Orders    CBC    Ferritin    TIBC    Supervision of high risk pregnancy in third trimester - Primary     OB transfer from Dr Bowen [place] practice  Reviewed SLOGA set up, policies and contact information  Denies OB complaints  Good fetal movement  Denies contractions, cramping, leakage of fluid or vaginal bleeding  Baby girl - Junious Roof  Tdap administered today vaccine  Baby and Me considerations reinforced  Reviewed  labor precautions and FKCs  Relevant Orders    CBC    RPR    30 weeks gestation of pregnancy    Pregnancy affected by previous bariatric surgery, currently in third trimester     QID finger sticks were WNL per pt and GDM was ruled out  Recommended Fe studies, folate and D vits  Reviewed concepts of malabsorption  Relevant Orders    Ferritin    TIBC    Folate    Vitamin D 25 hydroxy    Maternal obesity affecting pregnancy, antepartum     Current BMI 36  Will advise weekly  testing if BMI of 40 is reached  3rd trimester growth surveillance is scheduled  Other Visit Diagnoses     Encounter for pregnancy related examination in third trimester        Relevant Orders    POCT urine dip (Completed)    Need for Tdap vaccination        Relevant Orders    TDAP Vaccine greater than or equal to 6yo (Completed)

## 2019-08-12 NOTE — ASSESSMENT & PLAN NOTE
Current BMI 36  Will advise weekly  testing if BMI of 40 is reached  3rd trimester growth surveillance is scheduled

## 2019-08-12 NOTE — ASSESSMENT & PLAN NOTE
Most recent hgb 9 9  Janes Almanza reports she is scheduled to see Heme later this week  The patient reports she is tolerating PO supplements and has tried to increase intake of Fe-rich foods  Discussed risks of maternal anemia for both mother and baby, and reviewed benefits of Venofer  We discussed effects of Lj-en-Y on absorption of Fe and she was advised to keep Heme consult as scheduled  Advised completion of repeat CBC and Fe studies prior to this appt  She agrees with plan

## 2019-08-12 NOTE — ASSESSMENT & PLAN NOTE
QID finger sticks were WNL per pt and GDM was ruled out  Recommended Fe studies, folate and D vits  Reviewed concepts of malabsorption

## 2019-08-14 ENCOUNTER — TELEPHONE (OUTPATIENT)
Dept: OBGYN CLINIC | Facility: CLINIC | Age: 29
End: 2019-08-14

## 2019-08-14 ENCOUNTER — CONSULT (OUTPATIENT)
Dept: HEMATOLOGY ONCOLOGY | Facility: CLINIC | Age: 29
End: 2019-08-14
Payer: COMMERCIAL

## 2019-08-14 VITALS
TEMPERATURE: 98.5 F | BODY MASS INDEX: 36.65 KG/M2 | DIASTOLIC BLOOD PRESSURE: 84 MMHG | OXYGEN SATURATION: 98 % | RESPIRATION RATE: 18 BRPM | SYSTOLIC BLOOD PRESSURE: 164 MMHG | WEIGHT: 220 LBS | HEIGHT: 65 IN | HEART RATE: 131 BPM

## 2019-08-14 DIAGNOSIS — Z98.84 HISTORY OF BARIATRIC SURGERY: ICD-10-CM

## 2019-08-14 DIAGNOSIS — O99.013 ANEMIA DURING PREGNANCY IN THIRD TRIMESTER: Primary | ICD-10-CM

## 2019-08-14 DIAGNOSIS — D64.9 ANEMIA, UNSPECIFIED TYPE: ICD-10-CM

## 2019-08-14 DIAGNOSIS — Z3A.24 24 WEEKS GESTATION OF PREGNANCY: ICD-10-CM

## 2019-08-14 PROCEDURE — 99243 OFF/OP CNSLTJ NEW/EST LOW 30: CPT | Performed by: INTERNAL MEDICINE

## 2019-08-14 NOTE — PROGRESS NOTES
HEMATOLOGY / ONCOLOGY CLINIC NOTE    Primary Care Provider: Elise Baptiste MD  Referring Provider: Venkat Andrade  MRN: [id number]  : 1990    Reason for Encounter:  Chief Complaint   Patient presents with   Karishma         Hematology / Oncology History:     Davie Mccoy is a 34 y o  female who came in for follow up      1, anemia - relatively new, hemoglobin 9 9, MCV 80 in 2019     2, , 31 week pregnancy    3, history of gastric bypass surgery in     Assessment / Plan:         1  Anemia during pregnancy in third trimester  - likely a combination of iron deficiency, gestational anemia, and history of gastric bypass surgery  Previous hemoglobin in May showed a 9 9  We will proceed with CBC and iron panel and follow patient next week to review result  If the hemoglobin is less than 9, patient will definitely need iron infusion  If the hemoglobin falls between 9-10, will recommend IV iron infusion  If hemoglobin above 10 there is no need, if able to tolerate, patient will need oral iron supplement  2  24 weeks gestation of pregnancy  - as above    3  Anemia, unspecified type    - Ambulatory referral to Hematology / Oncology  - Iron Panel (Includes Iron Saturation, Iron, and TIBC); Future    4  History of bariatric surgery             45       minutes were spent face to face with patient with greater than 50% of the time spent in counseling or coordination of care including discussions of treatment instructions  All of the patient's questions were answered to their satisfactory during this discussion  Advised pt to call if there is any further questions  Interval History:      2019 :  34 year female, 31 weeks pregnancy, came in to establish care with hematology for anemia  Patient has so has a history of gastric bypass surgery  Reported overall has been doing well, no complications from pregnancy so far  Her energy level is at baseline    She does not take iron supplement  No PICA  No bleeding  No previous history of anemia  No family history of hematology issues  Problem list:     Patient Active Problem List   Diagnosis    History of abnormal cervical Papanicolaou smear    BMI 36 0-36 9,adult    Pap smear of cervix with ASCUS, cannot exclude HGSIL    Anemia during pregnancy in third trimester    Supervision of high risk pregnancy in third trimester    History of bariatric surgery    Anemia    30 weeks gestation of pregnancy    Pregnancy affected by previous bariatric surgery, currently in third trimester    Obesity affecting pregnancy in third trimester    Rh negative state in antepartum period    Maternal obesity affecting pregnancy, antepartum       PHYSICIAL EXAMINATION:       Vital Signs:   [unfilled]  Body mass index is 36 61 kg/m²  Body surface area is 2 06 meters squared  No major findings on physical examination  GEN: Alert, awake oriented x3, in no acute distress  HEENT- No pallor, icterus, cyanosis, no oral mucosal lesions,   LAD - no palpable cervical, clavicle, axillary, inguinal LAD  Heart- normal S1 S2, regular rate and rhythm, No murmur, rubs  Lungs- decreased breathing sound bilateral    Abdomen- soft, Non tender, bowel sounds present  Extremities- No cyanosis, clubbing, edema  Neuro- No focal neurological deficit           PAST MEDICAL HISTORY:   has a past medical history of Abnormal Pap smear of cervix, Depression, and GERD (gastroesophageal reflux disease)  PAST SURGICAL HISTORY:   has a past surgical history that includes Ovarian cyst removal; Appendectomy; and Gastric bypass      CURRENT MEDICATIONS:   Current Outpatient Medications   Medication Sig Dispense Refill    acetaminophen (TYLENOL) 500 mg tablet Take 500 mg by mouth every 6 (six) hours as needed for mild pain      aspirin (ECOTRIN LOW STRENGTH) 81 mg EC tablet Take 2 tablets (162 mg total) by mouth daily 180 tablet 3    Prenatal Vit-Fe Fumarate-FA (PRENATAL VITAMIN PO) Take by mouth      Blood Glucose Monitoring Suppl (FREESTYLE LITE) JACINDA Test fasting and 2 hours after start of meals, 4 times daily (Patient not taking: Reported on 8/12/2019) 1 each 0    ferrous gluconate (FERGON) 324 mg tablet Take 324 mg by mouth daily with breakfast      glucose blood (FREESTYLE LITE) test strip Test fasting and 2 hours after start of each meal (Patient not taking: Reported on 8/12/2019) 50 each 0    Lancets (FREESTYLE) lancets Use a new lancet for each test 4 times daily (Patient not taking: Reported on 8/12/2019) 100 each 0     No current facility-administered medications for this visit  [unfilled]    SOCIAL HISTORY:   reports that she has quit smoking  She has never used smokeless tobacco  She reports that she drank alcohol  She reports that she does not use drugs  FAMILY HISTORY:  family history includes Cancer in her maternal grandmother; Heart failure in her maternal grandfather; No Known Problems in her brother, father, mother, paternal grandfather, paternal grandmother, and sister  ALLERGIES:  has No Known Allergies  REVIEW OF SYSTEMS:  Please note that a 14-point review of systems was performed to include Constitutional, HEENT, Respiratory, CVS, GI, , Musculoskeletal, Integumentary, Neurologic, Rheumatologic, Endocrinologic, Psychiatric, Lymphatic, and Hematologic/Oncologic systems were reviewed and are negative unless otherwise stated in HPI  Positive and negative findings pertinent to this evaluation are incorporated into the history of present illness              LAB:  Lab Results   Component Value Date    WBC 12 02 (H) 05/13/2019    HGB 9 9 (L) 05/13/2019    HCT 32 0 (L) 05/13/2019    MCV 80 (L) 05/13/2019     05/13/2019     No results found for: NA, SODIUM, K, CL, CO2, ANIONGAP, AGAP, BUN, CREATININE, GLUC, GLUF, CALCIUM, AST, ALT, ALKPHOS, PROT, TP, BILITOT, TBILI, EGFR    CBC with diff:       Invalid input(s):  RBC, TOTALCELLSCOUNTED, SEGS%, GRANS%, LYMPHS%, EOS%, BASO%, ABNEUT, ABGRANS, ABLYMPHS, ABMOMOS, ABEOS, ABBASO    CMP:      Invalid input(s): ALBUMIN        IMAGING:  No orders to display     No results found

## 2019-08-15 ENCOUNTER — APPOINTMENT (OUTPATIENT)
Dept: LAB | Facility: HOSPITAL | Age: 29
End: 2019-08-15
Payer: COMMERCIAL

## 2019-08-15 DIAGNOSIS — O99.013 ANEMIA DURING PREGNANCY IN THIRD TRIMESTER: ICD-10-CM

## 2019-08-15 DIAGNOSIS — O99.843 PREGNANCY AFFECTED BY PREVIOUS BARIATRIC SURGERY, CURRENTLY IN THIRD TRIMESTER: ICD-10-CM

## 2019-08-15 DIAGNOSIS — O09.93 SUPERVISION OF HIGH RISK PREGNANCY IN THIRD TRIMESTER: ICD-10-CM

## 2019-08-15 DIAGNOSIS — D64.9 ANEMIA, UNSPECIFIED TYPE: ICD-10-CM

## 2019-08-15 LAB
25(OH)D3 SERPL-MCNC: 21.7 NG/ML (ref 30–100)
ERYTHROCYTE [DISTWIDTH] IN BLOOD BY AUTOMATED COUNT: 14.2 % (ref 11.6–15.1)
FERRITIN SERPL-MCNC: 7 NG/ML (ref 8–388)
FOLATE SERPL-MCNC: >20 NG/ML (ref 3.1–17.5)
HCT VFR BLD AUTO: 32.5 % (ref 34.8–46.1)
HGB BLD-MCNC: 10.9 G/DL (ref 11.5–15.4)
IRON SATN MFR SERPL: 21 %
IRON SERPL-MCNC: 95 UG/DL (ref 50–170)
MCH RBC QN AUTO: 29.8 PG (ref 26.8–34.3)
MCHC RBC AUTO-ENTMCNC: 33.5 G/DL (ref 31.4–37.4)
MCV RBC AUTO: 89 FL (ref 82–98)
PLATELET # BLD AUTO: 184 THOUSANDS/UL (ref 149–390)
PMV BLD AUTO: 10.3 FL (ref 8.9–12.7)
RBC # BLD AUTO: 3.66 MILLION/UL (ref 3.81–5.12)
TIBC SERPL-MCNC: 447 UG/DL (ref 250–450)
WBC # BLD AUTO: 19 THOUSAND/UL (ref 4.31–10.16)

## 2019-08-15 PROCEDURE — 82306 VITAMIN D 25 HYDROXY: CPT

## 2019-08-15 PROCEDURE — 82746 ASSAY OF FOLIC ACID SERUM: CPT

## 2019-08-15 PROCEDURE — 36415 COLL VENOUS BLD VENIPUNCTURE: CPT

## 2019-08-15 PROCEDURE — 85027 COMPLETE CBC AUTOMATED: CPT

## 2019-08-15 PROCEDURE — 86592 SYPHILIS TEST NON-TREP QUAL: CPT

## 2019-08-15 PROCEDURE — 82728 ASSAY OF FERRITIN: CPT

## 2019-08-15 PROCEDURE — 83540 ASSAY OF IRON: CPT

## 2019-08-15 PROCEDURE — 83550 IRON BINDING TEST: CPT

## 2019-08-16 ENCOUNTER — TELEPHONE (OUTPATIENT)
Dept: OBGYN CLINIC | Facility: CLINIC | Age: 29
End: 2019-08-16

## 2019-08-16 LAB — RPR SER QL: NORMAL

## 2019-08-16 NOTE — TELEPHONE ENCOUNTER
----- Message from Leonela Abel, [address] Dam[address] Gonzalez sent at 8/16/2019  2:53 PM EDT -----  Labs were ordered for hx of bariatric surgery, currently pregnant   Vitamin D is low - please inquire as to whether or not the patient is currently on supplement; if not please ask her to contact her bariatric specialist to inquire about what dose they recommend   CBC with anemia - the patient has been referred to Hematology for management of anemia   Folate level is within acceptable range

## 2019-08-16 NOTE — TELEPHONE ENCOUNTER
Spoke with Pt today via phone call  Pt informed that her recent lab work test result showed that her vitamin D level is low  Pt states she does not take a Vitamin D supplement at this time  Pt instructed to contact her bariatric specialist to inquire as to what dosage of Vitamin D is recommended  Pt has been previously referred to Hematology for management of anemia, scheduled to see Hematology this week per Jennifer Kothari note date 8/12/19  Pt further informed that her folate level is within acceptable range  Reiterated to Pt that if she has any questions/concerns to contact office

## 2019-08-19 ENCOUNTER — OFFICE VISIT (OUTPATIENT)
Dept: FAMILY MEDICINE CLINIC | Facility: CLINIC | Age: 29
End: 2019-08-19

## 2019-08-19 VITALS
WEIGHT: 221 LBS | DIASTOLIC BLOOD PRESSURE: 68 MMHG | TEMPERATURE: 99.2 F | HEIGHT: 65 IN | HEART RATE: 84 BPM | RESPIRATION RATE: 16 BRPM | SYSTOLIC BLOOD PRESSURE: 122 MMHG | BODY MASS INDEX: 36.82 KG/M2

## 2019-08-19 DIAGNOSIS — O99.013 ANEMIA DURING PREGNANCY IN THIRD TRIMESTER: Primary | ICD-10-CM

## 2019-08-19 PROCEDURE — 99213 OFFICE O/P EST LOW 20 MIN: CPT | Performed by: FAMILY MEDICINE

## 2019-08-19 PROCEDURE — 3725F SCREEN DEPRESSION PERFORMED: CPT | Performed by: FAMILY MEDICINE

## 2019-08-19 NOTE — PROGRESS NOTES
Assessment/Plan:      Diagnoses and all orders for this visit:    Anemia during pregnancy in third trimester  -     Ambulatory referral to Hematology / Oncology; Future     Tolerating p o  Iron well  Currently taking 324 mg daily  Recent CBC show improvement of hemoglobin of 1 point to 10 9  Will refer to Hematology Dr Seth Hooper per patient's request for insurance coverage  Subjective:     Patient ID: Nicholas Lawrence is a 34 y o  female  Natalie Phan is a 34year old female who presents to the clinic requesting a referral to hematology for insurance purposes  She was referred to Hematology by her OBGYN for a 2nd opinion on anemia in the 3rd trimester  She went to hematology last week and is going to see them again tomorrow for F/U  Repeat blood work was requested by hematology which was done this past week  Insurance needs patient to see PCP prior to authorizing this upcoming consult  Patient had a Hb level of 9 9 previously and labs done last week showed a Hb level of 10 9  Hematology had recommended potential IV iron infusion if patient's hb was less than 10 with the option of iron supplementation if it was greater than 10  Review of Systems   Constitutional: Negative for fatigue  Eyes: Negative for visual disturbance  Respiratory: Negative for chest tightness and shortness of breath  Cardiovascular: Negative for chest pain and palpitations  Neurological: Negative for dizziness, syncope, light-headedness and headaches  All other systems reviewed and are negative  Objective:     Physical Exam   Constitutional: She is oriented to person, place, and time  She appears well-developed and well-nourished  No distress  Eyes: Conjunctivae and EOM are normal  Right eye exhibits no discharge  Left eye exhibits no discharge  Neck: Normal range of motion  Neck supple  Cardiovascular: Normal rate  Pulmonary/Chest: Effort normal    Neurological: She is oriented to person, place, and time  Skin: Skin is warm and dry  Capillary refill takes less than 2 seconds  She is not diaphoretic  Nursing note and vitals reviewed

## 2019-08-19 NOTE — ASSESSMENT & PLAN NOTE
Tolerating p o  Iron well  Currently taking 324 mg daily  Recent CBC show improvement of hemoglobin of 1 point to 10 9  Will refer to Hematology Dr Gris Rodarte per patient's request for insurance coverage

## 2019-08-20 ENCOUNTER — OFFICE VISIT (OUTPATIENT)
Dept: HEMATOLOGY ONCOLOGY | Facility: CLINIC | Age: 29
End: 2019-08-20
Payer: COMMERCIAL

## 2019-08-20 VITALS
HEART RATE: 117 BPM | OXYGEN SATURATION: 98 % | HEIGHT: 66 IN | TEMPERATURE: 97.6 F | RESPIRATION RATE: 16 BRPM | DIASTOLIC BLOOD PRESSURE: 78 MMHG | WEIGHT: 221 LBS | BODY MASS INDEX: 35.52 KG/M2 | SYSTOLIC BLOOD PRESSURE: 124 MMHG

## 2019-08-20 DIAGNOSIS — D72.829 LEUKOCYTOSIS, UNSPECIFIED TYPE: ICD-10-CM

## 2019-08-20 DIAGNOSIS — D64.9 ANEMIA, UNSPECIFIED TYPE: Primary | ICD-10-CM

## 2019-08-20 DIAGNOSIS — Z3A.32 32 WEEKS GESTATION OF PREGNANCY: ICD-10-CM

## 2019-08-20 DIAGNOSIS — Z98.84 HISTORY OF BARIATRIC SURGERY: ICD-10-CM

## 2019-08-20 PROCEDURE — 99213 OFFICE O/P EST LOW 20 MIN: CPT | Performed by: PHYSICIAN ASSISTANT

## 2019-08-20 NOTE — PROGRESS NOTES
HEMATOLOGY / ONCOLOGY CLINIC NOTE    Primary Care Provider: Ricky Jaramillo MD  Referring Provider:    MRN: [id number]  : 1990    DATE: 2019  Reason for Encounter:  Chief Complaint   Patient presents with    Follow-up   Anemia in Pregnancy/History of Gastric Bypass      Hematology / Oncology History:     Tami Mao is a 34 y o  female who came in for follow up      1, anemia - relatively new, hemoglobin 9 9, MCV 80 in 2019      2, , 32 weeks 3 days pregnancy      3, history of gastric bypass surgery in       Interval History:   2019 :  34 year female, 31 weeks pregnancy, came in to establish care with hematology for anemia  Patient has so has a history of gastric bypass surgery      Reported overall has been doing well, no complications from pregnancy so far  Her energy level is at baseline  She does not take iron supplement  No PICA      No bleeding  No previous history of anemia  No family history of hematology issues  19:  No PICA  No issues with pregnancy  OTC iron supplement, intermittent constipation  Occasional stool softener  No fatigue  Drinking water, staying hydrated    Assessment / Plan:     1  Anemia, unspecified type  Mild iron deficiency with gestational anemia, in part due to prior gastric bypass  Hgb stable at 10 9, with grade 1 anemia  Ferritin low, as is iron saturation at 21%  We will supplement with Iron supplement, and will take OTC supplement  Will be due prior to return, which we will schedule in 2 months  We will repeat iron studies, CBC prior to visit  - CBC and differential; Future  - Iron Panel (Includes Iron Saturation, Iron, and TIBC); Future  - Ferritin; Future    2  History of bariatric surgery  History of Gastric bypass in        3  Leukocytosis, unspecified type  Elevated WBC count  May be due to inflammation, infection, stress, or due to pregnancy  Will obtain flow cytometry  Asymptomatic for infection      - CBC and differential; Future  - Leukemia/Lymphoma flow cytometry; Future    4  32 weeks gestation of pregnancy  Pregnancy is progressing well  No issues  ECOG PS 0    Problem list:     Patient Active Problem List   Diagnosis    History of abnormal cervical Papanicolaou smear    BMI 36 0-36 9,adult    Pap smear of cervix with ASCUS, cannot exclude HGSIL    Anemia during pregnancy in third trimester    Supervision of high risk pregnancy in third trimester    History of bariatric surgery    Anemia    30 weeks gestation of pregnancy    Pregnancy affected by previous bariatric surgery, currently in third trimester    Obesity affecting pregnancy in third trimester    Rh negative state in antepartum period    Maternal obesity affecting pregnancy, antepartum     PHYSICIAL EXAMINATION:   Vital Signs:   /78 (BP Location: Left arm, Patient Position: Sitting, Cuff Size: Adult)   Pulse (!) 117   Temp 97 6 °F (36 4 °C)   Resp 16   Ht 5' 6" (1 676 m)   Wt 100 kg (221 lb)   LMP 01/13/2019   SpO2 98%   BMI 35 67 kg/m²    Body mass index is 35 67 kg/m²  Body surface area is 2 08 meters squared  No significant change compared to previous visit  HR elevated  Repeat by myself was 104  Otherwise Stable  Physical Exam   Constitutional: She is oriented to person, place, and time  She appears well-developed and well-nourished  No distress  HENT:   Head: Normocephalic and atraumatic  Mouth/Throat: Oropharynx is clear and moist  No oropharyngeal exudate  Eyes: Pupils are equal, round, and reactive to light  Conjunctivae and EOM are normal  Right eye exhibits no discharge  Left eye exhibits no discharge  No scleral icterus  Neck: Normal range of motion  Neck supple  No tracheal deviation present  No thyromegaly present  Cardiovascular: Normal rate, regular rhythm and normal heart sounds  Exam reveals no gallop and no friction rub  No murmur heard    Pulmonary/Chest: Effort normal and breath sounds normal  No stridor  No respiratory distress  She has no wheezes  She has no rales  Abdominal: Soft  Bowel sounds are normal  She exhibits no distension and no mass  There is no tenderness  There is no guarding  Genitourinary:   Genitourinary Comments: Deferred  Musculoskeletal: Normal range of motion  She exhibits no edema  Lymphadenopathy:     She has no cervical adenopathy  Neurological: She is alert and oriented to person, place, and time  Skin: Skin is warm and dry  She is not diaphoretic  No erythema  No pallor  Psychiatric: She has a normal mood and affect  Her behavior is normal      HISTORY/MEDS/ALLERGIES/ROS/Results   PAST MEDICAL HISTORY:   has a past medical history of Abnormal Pap smear of cervix, Depression, and GERD (gastroesophageal reflux disease)  PAST SURGICAL HISTORY:   has a past surgical history that includes Ovarian cyst removal; Appendectomy; and Gastric bypass  CURRENT MEDICATIONS:   Current Outpatient Medications   Medication Sig Dispense Refill    acetaminophen (TYLENOL) 500 mg tablet Take 500 mg by mouth every 6 (six) hours as needed for mild pain      aspirin (ECOTRIN LOW STRENGTH) 81 mg EC tablet Take 2 tablets (162 mg total) by mouth daily 180 tablet 3    ferrous gluconate (FERGON) 324 mg tablet Take 324 mg by mouth daily with breakfast      Prenatal Vit-Fe Fumarate-FA (PRENATAL VITAMIN PO) Take by mouth      Blood Glucose Monitoring Suppl (FREESTYLE LITE) JACINDA Test fasting and 2 hours after start of meals, 4 times daily (Patient not taking: Reported on 8/12/2019) 1 each 0    glucose blood (FREESTYLE LITE) test strip Test fasting and 2 hours after start of each meal (Patient not taking: Reported on 8/12/2019) 50 each 0    Lancets (FREESTYLE) lancets Use a new lancet for each test 4 times daily (Patient not taking: Reported on 8/12/2019) 100 each 0     No current facility-administered medications for this visit        SOCIAL HISTORY:   reports that she has quit smoking  She has never used smokeless tobacco  She reports that she drank alcohol  She reports that she does not use drugs  FAMILY HISTORY:  family history includes Cancer in her maternal grandmother; Heart failure in her maternal grandfather; No Known Problems in her brother, father, mother, paternal grandfather, paternal grandmother, and sister  ALLERGIES:  has No Known Allergies  REVIEW OF SYSTEMS:  Review of Systems   Constitutional: Positive for fatigue  Negative for appetite change, fever and unexpected weight change  HENT: Negative for nosebleeds, sore throat and trouble swallowing  Eyes: Negative for visual disturbance  Respiratory: Negative for cough, chest tightness, shortness of breath and wheezing  Cardiovascular: Negative for chest pain, palpitations and leg swelling  Gastrointestinal: Positive for constipation  Negative for abdominal distention (Consistent with pregnancy ), abdominal pain, blood in stool, diarrhea, nausea and vomiting  Genitourinary: Negative for difficulty urinating, dysuria, hematuria, pelvic pain and vaginal bleeding  Musculoskeletal: Negative for arthralgias, back pain and myalgias  Skin: Negative for pallor and rash  Neurological: Negative for dizziness, weakness, numbness and headaches  Hematological: Negative for adenopathy  Does not bruise/bleed easily  Psychiatric/Behavioral: Negative for confusion and sleep disturbance  The patient is not nervous/anxious  Labs:   Lab Results   Component Value Date    WBC 19 00 (H) 08/15/2019    HGB 10 9 (L) 08/15/2019    HCT 32 5 (L) 08/15/2019    MCV 89 08/15/2019     08/15/2019   Elevated WBC  Hgb mild decrease  Lab Results   Component Value Date    IRON 95 08/15/2019    TIBC 447 08/15/2019    FERRITIN 7 (L) 08/15/2019   Mild decrease in Iron saturation at 21%  IMAGING:  No results found

## 2019-08-22 DIAGNOSIS — Z98.84 HISTORY OF GASTRIC BYPASS: ICD-10-CM

## 2019-08-22 DIAGNOSIS — Z3A.31 31 WEEKS GESTATION OF PREGNANCY: ICD-10-CM

## 2019-08-22 DIAGNOSIS — E55.9 VITAMIN D INSUFFICIENCY: Primary | ICD-10-CM

## 2019-08-22 RX ORDER — LANCETS 28 GAUGE
EACH MISCELLANEOUS
Qty: 100 EACH | Refills: 0 | OUTPATIENT
Start: 2019-08-22

## 2019-08-22 RX ORDER — MELATONIN
2000 DAILY
Qty: 60 TABLET | Refills: 12 | Status: SHIPPED | OUTPATIENT
Start: 2019-08-22 | End: 2019-10-01 | Stop reason: ALTCHOICE

## 2019-08-23 DIAGNOSIS — Z98.84 HISTORY OF GASTRIC BYPASS: ICD-10-CM

## 2019-08-23 NOTE — PROGRESS NOTES
Requested patient call our office to report her sugars and prescribed vit D 2000 iu daily for a low vit D level       Rae Ruiz MD

## 2019-08-26 ENCOUNTER — ROUTINE PRENATAL (OUTPATIENT)
Dept: OBGYN CLINIC | Facility: CLINIC | Age: 29
End: 2019-08-26

## 2019-08-26 VITALS — SYSTOLIC BLOOD PRESSURE: 114 MMHG | BODY MASS INDEX: 35.99 KG/M2 | WEIGHT: 223 LBS | DIASTOLIC BLOOD PRESSURE: 68 MMHG

## 2019-08-26 DIAGNOSIS — O99.843 PREGNANCY AFFECTED BY PREVIOUS BARIATRIC SURGERY, CURRENTLY IN THIRD TRIMESTER: Primary | ICD-10-CM

## 2019-08-26 DIAGNOSIS — O09.93 SUPERVISION OF HIGH RISK PREGNANCY IN THIRD TRIMESTER: ICD-10-CM

## 2019-08-26 LAB
SL AMB  POCT GLUCOSE, UA: NORMAL
SL AMB POCT URINE PROTEIN: NORMAL

## 2019-08-26 PROCEDURE — PNV: Performed by: OBSTETRICS & GYNECOLOGY

## 2019-08-26 NOTE — ASSESSMENT & PLAN NOTE
Patient generally feeling well  She is much improved with her recent nutritional changes and iron supplementation as her hemoglobin has risen to 10 9  Patient has  Center follow-up for growth ultrasound given her history of bariatric surgery  Patient and partner oriented to our practice as she is a transfer from Dr Armand Prado office

## 2019-08-26 NOTE — PROGRESS NOTES
O neg, s/p Rhogam,  s/p Tdap  Check in card and perineal massage given  Saw hematology, did not need iron infusions, hemoglobin went up  Feeling good, having good fetal movement  Urine dip negative for glucose and protein

## 2019-08-26 NOTE — PROGRESS NOTES
Problem List Items Addressed This Visit        Other    Supervision of high risk pregnancy in third trimester     Patient generally feeling well  She is much improved with her recent nutritional changes and iron supplementation as her hemoglobin has risen to 10 9  Patient has  Center follow-up for growth ultrasound given her history of bariatric surgery  Patient and partner oriented to our practice as she is a transfer from Dr Reta Frausto office           Pregnancy affected by previous bariatric surgery, currently in third trimester - Primary    Relevant Orders    POCT urine dip (Completed)

## 2019-08-31 ENCOUNTER — HOSPITAL ENCOUNTER (EMERGENCY)
Facility: HOSPITAL | Age: 29
Discharge: HOME | End: 2019-08-31
Attending: EMERGENCY MEDICINE | Admitting: EMERGENCY MEDICINE
Payer: COMMERCIAL

## 2019-08-31 VITALS
TEMPERATURE: 98.1 F | BODY MASS INDEX: 35.79 KG/M2 | SYSTOLIC BLOOD PRESSURE: 128 MMHG | HEART RATE: 118 BPM | RESPIRATION RATE: 16 BRPM | DIASTOLIC BLOOD PRESSURE: 76 MMHG | HEIGHT: 66 IN | OXYGEN SATURATION: 100 % | WEIGHT: 222.66 LBS

## 2019-08-31 DIAGNOSIS — L02.91 ABSCESS: Primary | ICD-10-CM

## 2019-08-31 PROCEDURE — 10061 I&D ABSCESS COMP/MULTIPLE: CPT | Performed by: EMERGENCY MEDICINE

## 2019-08-31 PROCEDURE — 99283 EMERGENCY DEPT VISIT LOW MDM: CPT | Performed by: EMERGENCY MEDICINE

## 2019-08-31 PROCEDURE — 87070 CULTURE OTHR SPECIMN AEROBIC: CPT | Performed by: EMERGENCY MEDICINE

## 2019-08-31 PROCEDURE — 99283 EMERGENCY DEPT VISIT LOW MDM: CPT

## 2019-08-31 PROCEDURE — 87205 SMEAR GRAM STAIN: CPT | Performed by: EMERGENCY MEDICINE

## 2019-08-31 RX ORDER — ACETAMINOPHEN 500 MG
500 TABLET ORAL EVERY 6 HOURS PRN
Qty: 30 TABLET | Refills: 0 | Status: SHIPPED | OUTPATIENT
Start: 2019-08-31 | End: 2019-09-06

## 2019-08-31 RX ORDER — LIDOCAINE HYDROCHLORIDE AND EPINEPHRINE 10; 10 MG/ML; UG/ML
10 INJECTION, SOLUTION INFILTRATION; PERINEURAL ONCE
Status: COMPLETED | OUTPATIENT
Start: 2019-08-31 | End: 2019-08-31

## 2019-08-31 RX ADMIN — LIDOCAINE HYDROCHLORIDE,EPINEPHRINE BITARTRATE 10 ML: 10; .01 INJECTION, SOLUTION INFILTRATION; PERINEURAL at 18:58

## 2019-08-31 NOTE — ED PROVIDER NOTES
History  Chief Complaint   Patient presents with    Abscess     abscess R groin     25year old female  at 29 weeks presents with complaint of abscess in right groin area  First noticed the abscess about 5 days ago and it has increased in size and has become more uncomfortable  The patient reports prior history of ingrown hairs in the area which she gets from shaving  She never had to get an abscess drained before because, "they never got this big or painful "      Denies any fevers, chills, chest pain, shortness of breath, abdominal pain , n/v/d, dysuria, vaginal bleeding or discharge  Prior to Admission Medications   Prescriptions Last Dose Informant Patient Reported? Taking?    Blood Glucose Monitoring Suppl (FREESTYLE LITE) JACINDA  Self No No   Sig: Test fasting and 2 hours after start of meals, 4 times daily   Patient not taking: Reported on 2019   Lancets (FREESTYLE) lancets  Self No No   Sig: Use a new lancet for each test 4 times daily   Patient not taking: Reported on 2019   Prenatal Vit-Fe Fumarate-FA (PRENATAL VITAMIN PO)  Self Yes No   Sig: Take by mouth   acetaminophen (TYLENOL) 500 mg tablet  Self Yes No   Sig: Take 500 mg by mouth every 6 (six) hours as needed for mild pain   aspirin (ECOTRIN LOW STRENGTH) 81 mg EC tablet  Self No No   Sig: Take 2 tablets (162 mg total) by mouth daily   cholecalciferol (VITAMIN D3) 1,000 units tablet   No No   Sig: Take 2 tablets (2,000 Units total) by mouth daily   ferrous gluconate (FERGON) 324 mg tablet  Self Yes No   Sig: Take 324 mg by mouth daily with breakfast   glucose blood (FREESTYLE LITE) test strip  Self No No   Sig: Test fasting and 2 hours after start of each meal   Patient not taking: Reported on 2019      Facility-Administered Medications: None       Past Medical History:   Diagnosis Date    Abnormal Pap smear of cervix     ASCUS , ASCUS cannot exclude HGIL     Depression     GERD (gastroesophageal reflux disease)        Past Surgical History:   Procedure Laterality Date    APPENDECTOMY      GASTRIC BYPASS      OVARIAN CYST REMOVAL         Family History   Problem Relation Age of Onset    No Known Problems Mother     No Known Problems Father     No Known Problems Sister     No Known Problems Brother     Cancer Maternal Grandmother         Lung    Heart failure Maternal Grandfather     No Known Problems Paternal Grandmother     No Known Problems Paternal Grandfather      I have reviewed and agree with the history as documented  Social History     Tobacco Use    Smoking status: Former Smoker    Smokeless tobacco: Never Used    Tobacco comment: quit 3 weeks ago   Substance Use Topics    Alcohol use: Not Currently    Drug use: No        Review of Systems   Constitutional: Negative for chills, diaphoresis, fatigue and fever  HENT: Negative for congestion, ear discharge, facial swelling, hearing loss, rhinorrhea, sinus pressure, sinus pain, sneezing, sore throat, tinnitus and trouble swallowing  Eyes: Negative for pain, discharge and redness  Respiratory: Negative for cough, choking, chest tightness, shortness of breath, wheezing and stridor  Cardiovascular: Negative for chest pain, palpitations and leg swelling  Gastrointestinal: Negative for abdominal distention, abdominal pain, blood in stool, constipation, diarrhea, nausea and vomiting  Endocrine: Negative for cold intolerance, polydipsia and polyuria  Genitourinary: Negative for difficulty urinating, dysuria, enuresis, flank pain, frequency and hematuria  Musculoskeletal: Negative for arthralgias, back pain, gait problem and neck stiffness  Skin: Positive for rash  Negative for wound  abscess   Neurological: Negative for dizziness, seizures, syncope, weakness, numbness and headaches  Hematological: Negative for adenopathy     Psychiatric/Behavioral: Negative for agitation, confusion, hallucinations, sleep disturbance and suicidal ideas  All other systems reviewed and are negative  Physical Exam  ED Triage Vitals [08/31/19 1743]   Temperature Pulse Respirations Blood Pressure SpO2   98 1 °F (36 7 °C) (!) 118 16 128/76 100 %      Temp Source Heart Rate Source Patient Position - Orthostatic VS BP Location FiO2 (%)   Oral -- -- -- --      Pain Score       9             Orthostatic Vital Signs  Vitals:    08/31/19 1743   BP: 128/76   Pulse: (!) 118       Physical Exam   Constitutional: She is oriented to person, place, and time  She appears well-developed and well-nourished  No distress  HENT:   Head: Normocephalic and atraumatic  Right Ear: External ear normal    Left Ear: External ear normal    Nose: No sinus tenderness  No epistaxis  Mouth/Throat: No oropharyngeal exudate  Eyes: Pupils are equal, round, and reactive to light  Conjunctivae and EOM are normal  Right eye exhibits no discharge  Left eye exhibits no discharge  Neck: No JVD present  Cardiovascular: Normal rate, regular rhythm, normal heart sounds and intact distal pulses  Exam reveals no gallop and no friction rub  No murmur heard  Pulmonary/Chest: Effort normal and breath sounds normal  No stridor  No respiratory distress  She has no wheezes  She has no rales  Abdominal: Soft  Bowel sounds are normal  She exhibits no distension and no mass  There is no tenderness  There is no rebound and no guarding  Genitourinary:         Musculoskeletal: Normal range of motion  She exhibits no edema, tenderness or deformity  Lymphadenopathy:     She has no cervical adenopathy  Neurological: She is alert and oriented to person, place, and time  She has normal strength  No cranial nerve deficit or sensory deficit  GCS eye subscore is 4  GCS verbal subscore is 5  GCS motor subscore is 6  Reflex Scores:       Patellar reflexes are 2+ on the right side and 2+ on the left side  UE and LE 5/5 strength, No focal neuro deficits     Skin: Skin is warm, dry and intact  Capillary refill takes less than 2 seconds  She is not diaphoretic  Psychiatric: She has a normal mood and affect  Her speech is normal and behavior is normal  Judgment and thought content normal    Nursing note and vitals reviewed  ED Medications  Medications   lidocaine-epinephrine (XYLOCAINE/EPINEPHRINE) 1 %-1:100,000 injection 10 mL (10 mL Infiltration Given 8/31/19 5228)       Diagnostic Studies  Results Reviewed     Procedure Component Value Units Date/Time    Wound culture and Gram stain [[id number]] Collected:  08/31/19 1915    Lab Status:  No result Specimen:  Wound from Groin                  No orders to display         Procedures  Incision and drain  Date/Time: 8/31/2019 6:52 PM  Performed by: Caryle Bright, DO  Authorized by: Caryle Bright, DO     Patient location:  ED  Consent:     Consent obtained:  Verbal    Consent given by:  Patient    Risks discussed:  Incomplete drainage    Alternatives discussed:  No treatment  Universal protocol:     Procedure explained and questions answered to patient or proxy's satisfaction: yes      Patient identity confirmed:  Verbally with patient  Location:     Type:  Abscess    Size:  2    Location:  Lower extremity    Lower extremity location: Rt inner thigh  Pre-procedure details:     Skin preparation:  Betadine  Anesthesia (see MAR for exact dosages): Anesthesia method:  None  Procedure details:     Complexity:  Simple    Incision types:  Elliptical    Scalpel blade:  11    Approach:  Open    Incision depth:  Subcutaneous and submucosal    Wound management:  Probed and deloculated    Drainage:  Bloody and purulent    Drainage amount:  Scant    Wound treatment:  Wound left open    Packing materials:  1/4 in gauze  Post-procedure details:     Patient tolerance of procedure:   Tolerated well, no immediate complications            ED Course                               MDM  Number of Diagnoses or Management Options  Abscess: new and requires workup  Diagnosis management comments: ID see above    1  Rt leg abscess  -Culture sent  -Tylenol PRN  -PCP f/u  -ED PRN      Disposition  Final diagnoses:   Abscess - Rt inner thigh, 2 cm     Time reflects when diagnosis was documented in both MDM as applicable and the Disposition within this note     Time User Action Codes Description Comment    8/31/2019  7:12 PM Monique Briscoe Add [L02 91] Abscess     8/31/2019  7:12 PM Ayla Haryd Modify [L02 91] Abscess Rt inner thigh    8/31/2019  7:12 PM Ayla Hardy Modify [L02 91] Abscess Rt inner thigh, 2 cm      ED Disposition     ED Disposition Condition Date/Time Comment    Discharge Stable Sat Aug 31, 2019  7:12 PM Rossi Curry discharge to home/self care  Follow-up Information     Follow up With Specialties Details Why Contact Info Additional Information    Follow-up with your primary care physician  In 2 days       [address] [place] [address] Emergency Department Emergency Medicine Go to  If symptoms worsen, As needed [address] [place] [address] ED, [address] [place] [address], [address]          Patient's Medications   Discharge Prescriptions    ACETAMINOPHEN (TYLENOL) 500 MG TABLET    Take 1 tablet (500 mg total) by mouth every 6 (six) hours as needed for moderate pain       Start Date: 8/31/2019 End Date: --       Order Dose: 500 mg       Quantity: 30 tablet    Refills: 0     No discharge procedures on file  ED Provider  Attending physically available and evaluated Rossi Curry  I managed the patient along with the ED Attending      Electronically Signed by         Kristy Daniel DO  08/31/19 1920

## 2019-09-02 LAB
BACTERIA WND AEROBE CULT: NORMAL
GRAM STN SPEC: NORMAL
GRAM STN SPEC: NORMAL

## 2019-09-06 ENCOUNTER — OFFICE VISIT (OUTPATIENT)
Dept: FAMILY MEDICINE CLINIC | Facility: CLINIC | Age: 29
End: 2019-09-06

## 2019-09-06 VITALS
SYSTOLIC BLOOD PRESSURE: 110 MMHG | WEIGHT: 223.2 LBS | RESPIRATION RATE: 20 BRPM | HEART RATE: 112 BPM | BODY MASS INDEX: 35.87 KG/M2 | TEMPERATURE: 97.1 F | HEIGHT: 66 IN | DIASTOLIC BLOOD PRESSURE: 70 MMHG

## 2019-09-06 DIAGNOSIS — L72.9 SUBCUTANEOUS CYST: Primary | ICD-10-CM

## 2019-09-06 PROCEDURE — 99213 OFFICE O/P EST LOW 20 MIN: CPT | Performed by: FAMILY MEDICINE

## 2019-09-06 PROCEDURE — 3008F BODY MASS INDEX DOCD: CPT | Performed by: FAMILY MEDICINE

## 2019-09-06 NOTE — PROGRESS NOTES
Assessment/Plan:    Subcutaneous cyst  -Improving  -Right gluteal fold: 0 25cm  subcutaneous cyst  No erythema or discharge  Minimal tenderness to palpation  -S/p small I&D in ED last week  Does not appear infected on exam  recommend warm compresses and pregnancy dose tylenol for analgesic relief  -Precautions discussed         Problem List Items Addressed This Visit        Other    Subcutaneous cyst - Primary     -Improving  -Right gluteal fold: 0 25cm  subcutaneous cyst  No erythema or discharge  Minimal tenderness to palpation  -S/p small I&D in ED last week  Does not appear infected on exam  recommend warm compresses and pregnancy dose tylenol for analgesic relief  -Precautions discussed                 Subjective:      Patient ID: Mono Tabor is a 34 y o  female  HPI  33 yo F,  at 33 weeks, presents for a f/u on ER visit on 2019  Pt was having cyst on her Right groin area that was subsequently drained  No antibiotic was given  Wound culture is negative for any bacteria  Admits mild discomfort when sitting down  Groin pain is 3/10, doesn't radiate  Denies fever, wound discharge or progressive erythema, n/v, numbness, tingling  Review of Systems   Constitutional: Negative for chills and fever  Gastrointestinal: Negative for nausea and vomiting  Genitourinary: Negative for genital sores, pelvic pain, vaginal bleeding, vaginal discharge and vaginal pain  Skin: Positive for wound  Objective:      /70 (BP Location: Left arm, Patient Position: Sitting, Cuff Size: Large)   Pulse (!) 112   Temp (!) 97 1 °F (36 2 °C) (Tympanic)   Resp 20   Ht 5' 5 7" (1 669 m)   Wt 101 kg (223 lb 3 2 oz)   LMP 2019   BMI 36 36 kg/m²          Physical Exam   Constitutional: She appears well-developed and well-nourished  Cardiovascular: Normal rate, regular rhythm and normal heart sounds     Pulmonary/Chest: Effort normal and breath sounds normal    Genitourinary: Vagina normal  No vaginal discharge found  Genitourinary Comments: Right gluteal fold: 0 25cm  subcutaneous cyst  No erythema or discharge  Minimal tenderness to palpation

## 2019-09-08 PROBLEM — L72.9 SUBCUTANEOUS CYST: Status: ACTIVE | Noted: 2019-09-08

## 2019-09-08 NOTE — ASSESSMENT & PLAN NOTE
-Improving  -Right gluteal fold: 0 25cm  subcutaneous cyst  No erythema or discharge  Minimal tenderness to palpation  -S/p small I&D in ED last week   Does not appear infected on exam  recommend warm compresses and pregnancy dose tylenol for analgesic relief  -Precautions discussed

## 2019-09-10 ENCOUNTER — ROUTINE PRENATAL (OUTPATIENT)
Dept: OBGYN CLINIC | Facility: CLINIC | Age: 29
End: 2019-09-10

## 2019-09-10 VITALS — BODY MASS INDEX: 36.32 KG/M2 | DIASTOLIC BLOOD PRESSURE: 80 MMHG | SYSTOLIC BLOOD PRESSURE: 126 MMHG | WEIGHT: 223 LBS

## 2019-09-10 DIAGNOSIS — O99.210 MATERNAL OBESITY AFFECTING PREGNANCY, ANTEPARTUM: ICD-10-CM

## 2019-09-10 DIAGNOSIS — Z34.93 ENCOUNTER FOR PREGNANCY RELATED EXAMINATION IN THIRD TRIMESTER: ICD-10-CM

## 2019-09-10 DIAGNOSIS — O99.013 ANEMIA DURING PREGNANCY IN THIRD TRIMESTER: ICD-10-CM

## 2019-09-10 DIAGNOSIS — O09.93 SUPERVISION OF HIGH RISK PREGNANCY IN THIRD TRIMESTER: Primary | ICD-10-CM

## 2019-09-10 DIAGNOSIS — L72.9 SUBCUTANEOUS CYST: ICD-10-CM

## 2019-09-10 DIAGNOSIS — Z3A.34 34 WEEKS GESTATION OF PREGNANCY: ICD-10-CM

## 2019-09-10 PROBLEM — O99.213 OBESITY AFFECTING PREGNANCY IN THIRD TRIMESTER: Status: RESOLVED | Noted: 2019-07-31 | Resolved: 2019-09-10

## 2019-09-10 LAB
SL AMB  POCT GLUCOSE, UA: NEGATIVE
SL AMB POCT URINE PROTEIN: NEGATIVE

## 2019-09-10 PROCEDURE — 99213 OFFICE O/P EST LOW 20 MIN: CPT | Performed by: NURSE PRACTITIONER

## 2019-09-10 NOTE — ASSESSMENT & PLAN NOTE
Denies OB complaints  Good fetal movement  Denies contractions, cramping, leakage of fluid or vaginal bleeding  Flu vaccine recommended  Last administered 2019  Discussed benefits of updated vaccination each season - she will consider  S/p tdap vaccine  Baby and Me considerations reinforced  Reviewed  labor precautions and FKCs

## 2019-09-10 NOTE — ASSESSMENT & PLAN NOTE
Sebaceous cyst in groin resolved following I&D in ED  Fluid culture was neg for bacteria  Healing well per patient  She followed up with PCP prior to today's visit

## 2019-09-10 NOTE — PROGRESS NOTES
Problem List Items Addressed This Visit        Other    Anemia during pregnancy in third trimester     S/p Hematology consultation  Plan is to continue PO Fe and repeat labs  F/u is scheduled  Supervision of high risk pregnancy in third trimester - Primary     Denies OB complaints  Good fetal movement  Denies contractions, cramping, leakage of fluid or vaginal bleeding  Flu vaccine recommended  Last administered 2019  Discussed benefits of updated vaccination each season - she will consider  S/p tdap vaccine  Baby and Me considerations reinforced  Reviewed  labor precautions and FKCs  34 weeks gestation of pregnancy    Maternal obesity affecting pregnancy, antepartum     3rd trimester growth scan scheduled tomorrow  Subcutaneous cyst     Sebaceous cyst in groin resolved following I&D in ED  Fluid culture was neg for bacteria  Healing well per patient  She followed up with PCP prior to today's visit              Other Visit Diagnoses     Encounter for pregnancy related examination in third trimester        Relevant Orders    POCT urine dip (Completed)

## 2019-09-11 ENCOUNTER — ULTRASOUND (OUTPATIENT)
Dept: PERINATAL CARE | Facility: CLINIC | Age: 29
End: 2019-09-11
Payer: COMMERCIAL

## 2019-09-11 VITALS
BODY MASS INDEX: 37.36 KG/M2 | DIASTOLIC BLOOD PRESSURE: 77 MMHG | HEART RATE: 110 BPM | SYSTOLIC BLOOD PRESSURE: 115 MMHG | HEIGHT: 65 IN | WEIGHT: 224.2 LBS

## 2019-09-11 DIAGNOSIS — Z3A.34 34 WEEKS GESTATION OF PREGNANCY: ICD-10-CM

## 2019-09-11 DIAGNOSIS — Z36.89 ENCOUNTER FOR ULTRASOUND TO CHECK FETAL GROWTH: ICD-10-CM

## 2019-09-11 DIAGNOSIS — O99.210 MATERNAL OBESITY AFFECTING PREGNANCY, ANTEPARTUM: ICD-10-CM

## 2019-09-11 DIAGNOSIS — O99.843 PREGNANCY AFFECTED BY PREVIOUS BARIATRIC SURGERY, CURRENTLY IN THIRD TRIMESTER: Primary | ICD-10-CM

## 2019-09-11 PROCEDURE — 76816 OB US FOLLOW-UP PER FETUS: CPT | Performed by: OBSTETRICS & GYNECOLOGY

## 2019-09-11 NOTE — PATIENT INSTRUCTIONS
Thank you for choosing [id number] for your  care today  If you have any questions about your ultrasound or care, please do not hesitate to contact us or your primary obstetrician  At this time, no additional ultrasounds are advised through the  center, however, if your doctors would like you to have any additional ultrasounds, they will let us know

## 2019-09-11 NOTE — PROGRESS NOTES
[address] [facility] [facility] [address]: Ms Debra Hernandez was seen today at 34w3d for fetal growth assessment ultrasound  See ultrasound report under "OB Procedures" tab  Please don't hesitate to contact our office with any concerns or questions    Julia Avila MD

## 2019-09-23 ENCOUNTER — ROUTINE PRENATAL (OUTPATIENT)
Dept: OBGYN CLINIC | Facility: CLINIC | Age: 29
End: 2019-09-23
Payer: COMMERCIAL

## 2019-09-23 VITALS — BODY MASS INDEX: 37.61 KG/M2 | WEIGHT: 226 LBS | DIASTOLIC BLOOD PRESSURE: 80 MMHG | SYSTOLIC BLOOD PRESSURE: 126 MMHG

## 2019-09-23 DIAGNOSIS — O99.013 ANEMIA DURING PREGNANCY IN THIRD TRIMESTER: ICD-10-CM

## 2019-09-23 DIAGNOSIS — L72.9 SUBCUTANEOUS CYST: ICD-10-CM

## 2019-09-23 DIAGNOSIS — Z3A.36 36 WEEKS GESTATION OF PREGNANCY: ICD-10-CM

## 2019-09-23 DIAGNOSIS — O09.93 SUPERVISION OF HIGH RISK PREGNANCY IN THIRD TRIMESTER: Primary | ICD-10-CM

## 2019-09-23 DIAGNOSIS — O99.210 MATERNAL OBESITY AFFECTING PREGNANCY, ANTEPARTUM: ICD-10-CM

## 2019-09-23 DIAGNOSIS — Z34.93 ENCOUNTER FOR PREGNANCY RELATED EXAMINATION IN THIRD TRIMESTER: ICD-10-CM

## 2019-09-23 LAB
SL AMB  POCT GLUCOSE, UA: NEGATIVE
SL AMB POCT URINE PROTEIN: NEGATIVE

## 2019-09-23 PROCEDURE — 99213 OFFICE O/P EST LOW 20 MIN: CPT | Performed by: NURSE PRACTITIONER

## 2019-09-23 PROCEDURE — 87653 STREP B DNA AMP PROBE: CPT | Performed by: NURSE PRACTITIONER

## 2019-09-25 PROBLEM — O99.820 GBS (GROUP B STREPTOCOCCUS CARRIER), +RV CULTURE, CURRENTLY PREGNANT: Status: ACTIVE | Noted: 2019-09-25

## 2019-09-25 LAB — GP B STREP DNA SPEC QL NAA+PROBE: ABNORMAL

## 2019-09-25 NOTE — PROGRESS NOTES
Problem List Items Addressed This Visit        Other    Anemia during pregnancy in third trimester     F/u is scheduled with Heme  Supervision of high risk pregnancy in third trimester - Primary     Denies OB complaints  Good fetal movement  Denies contractions, cramping, leakage of fluid or vaginal bleeding  GBS collected today  S/p tdap vaccine  Baby and Me considerations reinforced  Reviewed labor precautions and FKCs  36 weeks gestation of pregnancy    Maternal obesity affecting pregnancy, antepartum     9/11/19: EFW 63%, AC 92%, MARIOLA 13 6  QID finger sticks were WNL per patient           Subcutaneous cyst     Resolved   Reviewed sx to report           Other Visit Diagnoses     Encounter for pregnancy related examination in third trimester        Relevant Orders    Strep B DNA probe, amplification    POCT urine dip (Completed)

## 2019-09-25 NOTE — ASSESSMENT & PLAN NOTE
Denies OB complaints  Good fetal movement  Denies contractions, cramping, leakage of fluid or vaginal bleeding  GBS collected today  S/p tdap vaccine  Baby and Me considerations reinforced  Reviewed labor precautions and FKCs

## 2019-10-01 ENCOUNTER — ROUTINE PRENATAL (OUTPATIENT)
Dept: OBGYN CLINIC | Facility: CLINIC | Age: 29
End: 2019-10-01
Payer: COMMERCIAL

## 2019-10-01 VITALS — BODY MASS INDEX: 37.94 KG/M2 | DIASTOLIC BLOOD PRESSURE: 70 MMHG | WEIGHT: 228 LBS | SYSTOLIC BLOOD PRESSURE: 108 MMHG

## 2019-10-01 DIAGNOSIS — Z34.83 PRENATAL CARE, SUBSEQUENT PREGNANCY, THIRD TRIMESTER: Primary | ICD-10-CM

## 2019-10-01 DIAGNOSIS — Z23 NEED FOR INFLUENZA VACCINATION: ICD-10-CM

## 2019-10-01 LAB
SL AMB  POCT GLUCOSE, UA: 250
SL AMB POCT URINE PROTEIN: 100

## 2019-10-01 PROCEDURE — 90471 IMMUNIZATION ADMIN: CPT | Performed by: STUDENT IN AN ORGANIZED HEALTH CARE EDUCATION/TRAINING PROGRAM

## 2019-10-01 PROCEDURE — 90686 IIV4 VACC NO PRSV 0.5 ML IM: CPT | Performed by: STUDENT IN AN ORGANIZED HEALTH CARE EDUCATION/TRAINING PROGRAM

## 2019-10-01 PROCEDURE — 99213 OFFICE O/P EST LOW 20 MIN: CPT | Performed by: STUDENT IN AN ORGANIZED HEALTH CARE EDUCATION/TRAINING PROGRAM

## 2019-10-01 RX ORDER — CHOLECALCIFEROL (VITAMIN D3) 25 MCG
2000 CAPSULE ORAL DAILY
Refills: 12 | COMMUNITY
Start: 2019-09-22 | End: 2020-05-19

## 2019-10-01 NOTE — PROGRESS NOTES
Patient aware +GBS  Last few weeks of pregnancy paper given  Patient had check-in visit  Endorses +FM, denies LOF/VB/regular ctx  Positive glucose and protein today - reports had two sodas prior to visit  Reviewed history, had previously been tracking glucoses with glucometer at home secondary to inability to complete 1hr test (history of bariatric surgery)  Attempted to check glucoses today in office but glucometer battery dead, unable to find replacement  Discussed with patient plan to check 2 hr postprandials and fasting glucoses over the next week and bring to next appointment  Expressed understanding  Reviewed labor precautions  No PES sx  Will continue to monitor closely  Follow up 1 week

## 2019-10-11 ENCOUNTER — ROUTINE PRENATAL (OUTPATIENT)
Dept: OBGYN CLINIC | Facility: CLINIC | Age: 29
End: 2019-10-11
Payer: COMMERCIAL

## 2019-10-11 VITALS — SYSTOLIC BLOOD PRESSURE: 120 MMHG | WEIGHT: 229 LBS | DIASTOLIC BLOOD PRESSURE: 80 MMHG | BODY MASS INDEX: 38.11 KG/M2

## 2019-10-11 DIAGNOSIS — Z34.83 PRENATAL CARE, SUBSEQUENT PREGNANCY, THIRD TRIMESTER: Primary | ICD-10-CM

## 2019-10-11 DIAGNOSIS — O09.93 SUPERVISION OF HIGH RISK PREGNANCY IN THIRD TRIMESTER: ICD-10-CM

## 2019-10-11 PROBLEM — Z3A.38 38 WEEKS GESTATION OF PREGNANCY: Status: ACTIVE | Noted: 2019-07-28

## 2019-10-11 LAB
SL AMB  POCT GLUCOSE, UA: NEGATIVE
SL AMB POCT URINE PROTEIN: NEGATIVE

## 2019-10-11 PROCEDURE — 76815 OB US LIMITED FETUS(S): CPT | Performed by: STUDENT IN AN ORGANIZED HEALTH CARE EDUCATION/TRAINING PROGRAM

## 2019-10-11 PROCEDURE — PNV: Performed by: STUDENT IN AN ORGANIZED HEALTH CARE EDUCATION/TRAINING PROGRAM

## 2019-10-11 NOTE — ASSESSMENT & PLAN NOTE
-unable to reach cervix on exam (very posterior)  -precautions reviewed  -discussed pain management in labor  -discussed need to come to hospital s/p ROM for PCN for GBS prophylaxis  -cephalic by sono  -s/p tdap/flu

## 2019-10-11 NOTE — PROGRESS NOTES
38 week OB visit  Feeling well overall and without concerns  Good FM  Denies LOF, VB, contractions  Denies dysuria, hematuria  No problems with activity  Walking intermittent  No questions/concerns  Would like to delay epidural as much as possible in labor       Problem List Items Addressed This Visit        Other    Supervision of high risk pregnancy in third trimester     -precautions reviewed  -discussed pain management in labor  -discussed need to come to hospital s/p ROM for PCN for GBS prophylaxis  -cephalic by sono  -s/p tdap/flu           Other Visit Diagnoses     Prenatal care, subsequent pregnancy, third trimester    -  Primary    Relevant Orders    POCT urine dip (Completed)

## 2019-10-17 ENCOUNTER — TELEPHONE (OUTPATIENT)
Dept: OBGYN CLINIC | Facility: CLINIC | Age: 29
End: 2019-10-17

## 2019-10-17 DIAGNOSIS — O09.93 SUPERVISION OF HIGH RISK PREGNANCY IN THIRD TRIMESTER: ICD-10-CM

## 2019-10-17 DIAGNOSIS — Z3A.38 38 WEEKS GESTATION OF PREGNANCY: ICD-10-CM

## 2019-10-17 DIAGNOSIS — O99.013 ANEMIA DURING PREGNANCY IN THIRD TRIMESTER: ICD-10-CM

## 2019-10-17 DIAGNOSIS — O99.820 GBS (GROUP B STREPTOCOCCUS CARRIER), +RV CULTURE, CURRENTLY PREGNANT: ICD-10-CM

## 2019-10-17 DIAGNOSIS — O99.210 MATERNAL OBESITY AFFECTING PREGNANCY, ANTEPARTUM: ICD-10-CM

## 2019-10-17 DIAGNOSIS — O99.843 PREGNANCY AFFECTED BY PREVIOUS BARIATRIC SURGERY, CURRENTLY IN THIRD TRIMESTER: Primary | ICD-10-CM

## 2019-10-17 DIAGNOSIS — O26.899 RH NEGATIVE STATE IN ANTEPARTUM PERIOD: ICD-10-CM

## 2019-10-17 DIAGNOSIS — Z67.91 RH NEGATIVE STATE IN ANTEPARTUM PERIOD: ICD-10-CM

## 2019-10-17 NOTE — TELEPHONE ENCOUNTER
Incoming call from patient  39 weeks gestation  Reports light brown/pinkish spotting since last night, with intermittent menstrual like cramping occurring every hour  Reports having intercourse on 10/15 and 3 days prior  Denies lof,contractions  Reports good fetal movement  Advised can be start of mucous plug or from intercourse  Advised to monitor for increased bleeding, contractions less than 10 minutes apart x 2 hours  Encouraged to continue keep doing [email or web address]  Keep routine prenatal appt on 10/18  Patient verbalizes understanding

## 2019-10-18 ENCOUNTER — ROUTINE PRENATAL (OUTPATIENT)
Dept: OBGYN CLINIC | Facility: CLINIC | Age: 29
End: 2019-10-18
Payer: COMMERCIAL

## 2019-10-18 VITALS — SYSTOLIC BLOOD PRESSURE: 134 MMHG | WEIGHT: 233.6 LBS | DIASTOLIC BLOOD PRESSURE: 82 MMHG | BODY MASS INDEX: 38.87 KG/M2

## 2019-10-18 DIAGNOSIS — O09.93 SUPERVISION OF HIGH RISK PREGNANCY IN THIRD TRIMESTER: Primary | ICD-10-CM

## 2019-10-18 DIAGNOSIS — Z67.91 RH NEGATIVE STATE IN ANTEPARTUM PERIOD: ICD-10-CM

## 2019-10-18 DIAGNOSIS — Z34.83 ENCOUNTER FOR SUPERVISION OF NORMAL PREGNANCY IN MULTIGRAVIDA IN THIRD TRIMESTER: ICD-10-CM

## 2019-10-18 DIAGNOSIS — O99.210 MATERNAL OBESITY AFFECTING PREGNANCY, ANTEPARTUM: ICD-10-CM

## 2019-10-18 DIAGNOSIS — O26.899 RH NEGATIVE STATE IN ANTEPARTUM PERIOD: ICD-10-CM

## 2019-10-18 DIAGNOSIS — R87.611 PAP SMEAR OF CERVIX WITH ASCUS, CANNOT EXCLUDE HGSIL: ICD-10-CM

## 2019-10-18 PROBLEM — Z3A.39 39 WEEKS GESTATION OF PREGNANCY: Status: ACTIVE | Noted: 2019-07-28

## 2019-10-18 LAB
SL AMB  POCT GLUCOSE, UA: NORMAL
SL AMB POCT URINE PROTEIN: NORMAL

## 2019-10-18 PROCEDURE — 99213 OFFICE O/P EST LOW 20 MIN: CPT | Performed by: STUDENT IN AN ORGANIZED HEALTH CARE EDUCATION/TRAINING PROGRAM

## 2019-10-18 NOTE — PROGRESS NOTES
39 week OB visit  Feeling tired and would like to deliver soon, but doing well overall and without concerns  Good FM  Denies LOF, VB, contractions  No problems with activity  Walking daily in hopes of hastening labor  No questions/concerns       Problem List Items Addressed This Visit        Other    Pap smear of cervix with ASCUS, cannot exclude HGSIL     Colposcopy with LCL  -repeat colposcopy postpartum         Supervision of high risk pregnancy in third trimester - Primary     -membranes swept  -precautions reviewed  -s/p tdap/flu  -GBS pos  -not currently interested in IOL, but will schedule at next visit if not delivered         Rh negative state in antepartum period     S/p rhogam         Maternal obesity affecting pregnancy, antepartum     -again discussed recommended weight gain in pregnancy  -pt walking daily  -growth sono wnl 9/11/19             Other Visit Diagnoses     Encounter for supervision of normal pregnancy in multigravida in third trimester        Relevant Orders    POCT urine dip

## 2019-10-18 NOTE — ASSESSMENT & PLAN NOTE
-membranes swept  -precautions reviewed  -s/p tdap/flu  -GBS pos  -not currently interested in IOL, but will schedule at next visit if not delivered

## 2019-10-19 ENCOUNTER — TELEPHONE (OUTPATIENT)
Dept: LABOR AND DELIVERY | Facility: HOSPITAL | Age: 29
End: 2019-10-19

## 2019-10-19 ENCOUNTER — ANESTHESIA EVENT (INPATIENT)
Dept: ANESTHESIOLOGY | Facility: HOSPITAL | Age: 29
DRG: 560 | End: 2019-10-19
Payer: COMMERCIAL

## 2019-10-19 ENCOUNTER — ANESTHESIA (INPATIENT)
Dept: ANESTHESIOLOGY | Facility: HOSPITAL | Age: 29
DRG: 560 | End: 2019-10-19
Payer: COMMERCIAL

## 2019-10-19 ENCOUNTER — HOSPITAL ENCOUNTER (INPATIENT)
Facility: HOSPITAL | Age: 29
LOS: 3 days | Discharge: HOME | DRG: 560 | End: 2019-10-22
Attending: STUDENT IN AN ORGANIZED HEALTH CARE EDUCATION/TRAINING PROGRAM | Admitting: STUDENT IN AN ORGANIZED HEALTH CARE EDUCATION/TRAINING PROGRAM
Payer: COMMERCIAL

## 2019-10-19 DIAGNOSIS — Z3A.39 39 WEEKS GESTATION OF PREGNANCY: ICD-10-CM

## 2019-10-19 LAB
ABO GROUP BLD: NORMAL
BASOPHILS # BLD AUTO: 0.06 THOUSANDS/ΜL (ref 0–0.1)
BASOPHILS NFR BLD AUTO: 0 % (ref 0–1)
BLD GP AB SCN SERPL QL: NEGATIVE
EOSINOPHIL # BLD AUTO: 0.01 THOUSAND/ΜL (ref 0–0.61)
EOSINOPHIL NFR BLD AUTO: 0 % (ref 0–6)
ERYTHROCYTE [DISTWIDTH] IN BLOOD BY AUTOMATED COUNT: 13.6 % (ref 11.6–15.1)
HCT VFR BLD AUTO: 36.5 % (ref 34.8–46.1)
HGB BLD-MCNC: 12 G/DL (ref 11.5–15.4)
IMM GRANULOCYTES # BLD AUTO: 0.23 THOUSAND/UL (ref 0–0.2)
IMM GRANULOCYTES NFR BLD AUTO: 1 % (ref 0–2)
LYMPHOCYTES # BLD AUTO: 1.66 THOUSANDS/ΜL (ref 0.6–4.47)
LYMPHOCYTES NFR BLD AUTO: 6 % (ref 14–44)
MCH RBC QN AUTO: 29.6 PG (ref 26.8–34.3)
MCHC RBC AUTO-ENTMCNC: 32.9 G/DL (ref 31.4–37.4)
MCV RBC AUTO: 90 FL (ref 82–98)
MONOCYTES # BLD AUTO: 1.86 THOUSAND/ΜL (ref 0.17–1.22)
MONOCYTES NFR BLD AUTO: 7 % (ref 4–12)
NEUTROPHILS # BLD AUTO: 23.55 THOUSANDS/ΜL (ref 1.85–7.62)
NEUTS SEG NFR BLD AUTO: 86 % (ref 43–75)
NRBC BLD AUTO-RTO: 0 /100 WBCS
PLATELET # BLD AUTO: 202 THOUSANDS/UL (ref 149–390)
PMV BLD AUTO: 10 FL (ref 8.9–12.7)
RBC # BLD AUTO: 4.05 MILLION/UL (ref 3.81–5.12)
RH BLD: NEGATIVE
SPECIMEN EXPIRATION DATE: NORMAL
WBC # BLD AUTO: 27.37 THOUSAND/UL (ref 4.31–10.16)

## 2019-10-19 PROCEDURE — 86850 RBC ANTIBODY SCREEN: CPT | Performed by: OBSTETRICS & GYNECOLOGY

## 2019-10-19 PROCEDURE — NC001 PR NO CHARGE: Performed by: OBSTETRICS & GYNECOLOGY

## 2019-10-19 PROCEDURE — 85025 COMPLETE CBC W/AUTO DIFF WBC: CPT | Performed by: OBSTETRICS & GYNECOLOGY

## 2019-10-19 PROCEDURE — 86900 BLOOD TYPING SEROLOGIC ABO: CPT | Performed by: OBSTETRICS & GYNECOLOGY

## 2019-10-19 PROCEDURE — 86592 SYPHILIS TEST NON-TREP QUAL: CPT | Performed by: OBSTETRICS & GYNECOLOGY

## 2019-10-19 PROCEDURE — 99214 OFFICE O/P EST MOD 30 MIN: CPT

## 2019-10-19 PROCEDURE — 86901 BLOOD TYPING SEROLOGIC RH(D): CPT | Performed by: OBSTETRICS & GYNECOLOGY

## 2019-10-19 RX ORDER — SODIUM CHLORIDE, SODIUM LACTATE, POTASSIUM CHLORIDE, CALCIUM CHLORIDE 600; 310; 30; 20 MG/100ML; MG/100ML; MG/100ML; MG/100ML
125 INJECTION, SOLUTION INTRAVENOUS CONTINUOUS
Status: DISCONTINUED | OUTPATIENT
Start: 2019-10-19 | End: 2019-10-20

## 2019-10-19 RX ORDER — OXYTOCIN/RINGER'S LACTATE 30/500 ML
PLASTIC BAG, INJECTION (ML) INTRAVENOUS
Status: COMPLETED
Start: 2019-10-19 | End: 2019-10-20

## 2019-10-19 RX ADMIN — SODIUM CHLORIDE, SODIUM LACTATE, POTASSIUM CHLORIDE, AND CALCIUM CHLORIDE 125 ML/HR: .6; .31; .03; .02 INJECTION, SOLUTION INTRAVENOUS at 22:42

## 2019-10-19 RX ADMIN — SODIUM CHLORIDE 2.5 MILLION UNITS: 9 INJECTION, SOLUTION INTRAVENOUS at 23:35

## 2019-10-19 RX ADMIN — ROPIVACAINE HYDROCHLORIDE: 5 INJECTION, SOLUTION EPIDURAL; INFILTRATION; PERINEURAL at 20:45

## 2019-10-19 RX ADMIN — SODIUM CHLORIDE 5 MILLION UNITS: 0.9 INJECTION, SOLUTION INTRAVENOUS at 19:45

## 2019-10-19 RX ADMIN — SODIUM CHLORIDE, SODIUM LACTATE, POTASSIUM CHLORIDE, AND CALCIUM CHLORIDE 125 ML/HR: .6; .31; .03; .02 INJECTION, SOLUTION INTRAVENOUS at 19:45

## 2019-10-19 RX ADMIN — SODIUM CHLORIDE, SODIUM LACTATE, POTASSIUM CHLORIDE, AND CALCIUM CHLORIDE 125 ML/HR: .6; .31; .03; .02 INJECTION, SOLUTION INTRAVENOUS at 20:50

## 2019-10-19 NOTE — TELEPHONE ENCOUNTER
Received tiger text from  " patient is 39wks in pain having contraction every 30 min  She's not sure what to do"    Spoke with [facility] by phone, reports contractions every 20-30 minutes  Does not think she broke her water, no heavy bleeding, no decreased fetal movement  Plan at this time to hydrate, watch contraction pattern  If becomes every 5-10 minutes lasting for 1 hour, to come in for evaluation  Asked her to call triage to inform us if she does plan to come in  Labor precautions reviewed  Patient in agreement

## 2019-10-19 NOTE — H&P
History & Physical - OB/GYN   Jessenia Brandt 34 y o  female MRN: [id number]  Unit/Bed#: LD PACU-03 Encounter: [id number]        She is a patient of [address]    Chief complaint:  "I've been dory since this morning"    HPI: Ruperto Ta is a 34 y o   at 39w6d by LMP with PATSY 10/20/19 who is being admitted for labor  Contractions:  Yes, started this morning around 0900 and increased in frequency from every hr to every 4-5 minutes    Fetal movement:  yes  Vaginal bleeding:   no  Leaking of fluid:  no      Pregnancy Complications:  Rh negative  Obesity  History of bariatric surgery  Anemia  GBS positive  ASCUS cannot exclude HSIL      PMH:  Past Medical History:   Diagnosis Date    Abnormal Pap smear of cervix     ASCUS , ASCUS cannot exclude HGIL     Anemia     Depression     hx of depression     GERD (gastroesophageal reflux disease)     resolved per pt     History of removal of ovarian cyst     Increased BMI     Shingles     with pregnancy    Subcutaneous cyst     Varicella     had chicken pox as child/shingles with pregnancy       PSH:  Past Surgical History:   Procedure Laterality Date    APPENDECTOMY      BARIATRIC SURGERY      GASTRIC BYPASS          OVARIAN CYST REMOVAL             Social Hx: Former smoker, quit when found out about pregnancy    Social History     Socioeconomic History    Marital status: Single     Spouse name: Not on file    Number of children: Not on file    Years of education: Not on file    Highest education level: Not on file   Occupational History    Not on file   Social Needs    Financial resource strain: Not on file    Food insecurity:     Worry: Not on file     Inability: Not on file    Transportation needs:     Medical: Not on file     Non-medical: Not on file   Tobacco Use    Smoking status: Former Smoker     Packs/day: 1 00     Years: 9 00     Pack years: 9 00     Last attempt to quit: 3/19/2019     Years since quittin 5    Smokeless tobacco: Never Used    Tobacco comment: quit with pregnancy, plans on staying smoke free   Substance and Sexual Activity    Alcohol use: Not Currently    Drug use: No    Sexual activity: Yes     Partners: Male   Lifestyle    Physical activity:     Days per week: Not on file     Minutes per session: Not on file    Stress: Not on file   Relationships    Social connections:     Talks on phone: Not on file     Gets together: Not on file     Attends Religion service: Not on file     Active member of club or organization: Not on file     Attends meetings of clubs or organizations: Not on file     Relationship status: Not on file    Intimate partner violence:     Fear of current or ex partner: Not on file     Emotionally abused: Not on file     Physically abused: Not on file     Forced sexual activity: Not on file   Other Topics Concern    Not on file   Social History Narrative    Not on file         OB Hx:  OB History    Para Term  AB Living   3 0 0 0 2 0   SAB TAB Ectopic Multiple Live Births   0 0 0 0 0      # Outcome Date GA Lbr Bong/2nd Weight Sex Delivery Anes PTL Lv   3 Current            2 AB 17           1 AB 06              Obstetric Comments   Anemia, Rh negative, bariatric surgery, 2 VIP,cigarette smoker quit with pregancy       Meds:  No current facility-administered medications on file prior to encounter        Current Outpatient Medications on File Prior to Encounter   Medication Sig Dispense Refill    acetaminophen (TYLENOL) 500 mg tablet Take 500 mg by mouth every 6 (six) hours as needed for mild pain      aspirin (ECOTRIN LOW STRENGTH) 81 mg EC tablet Take 2 tablets (162 mg total) by mouth daily 180 tablet 3    [facility] D3 1000 units capsule Take 2,000 Units by mouth daily  12    ferrous gluconate (FERGON) 324 mg tablet Take 324 mg by mouth daily with breakfast      Prenatal Vit-Fe Fumarate-FA (PRENATAL VITAMIN PO) Take 1 tablet by mouth daily Allergies:  No Known Allergies    Labs:  Blood type: O-  Antibody: negative  Group B strep: positive  HIV: negative  Hepatitis B: negative  RPR: NR  Rubella: Immune  Varicella Immune      Physical Exam:  /86 (BP Location: Left arm)   Pulse 100   Temp (!) 97 1 °F (36 2 °C) (Tympanic)   Resp 16   LMP 2019     Physical Exam   Constitutional: She is oriented to person, place, and time  She appears well-developed and well-nourished  No distress  HENT:   Head: Normocephalic and atraumatic  Cardiovascular: Normal rate and intact distal pulses  Pulmonary/Chest: Effort normal  No respiratory distress  She exhibits no tenderness  Abdominal: Soft  She exhibits no distension  There is no tenderness  There is no rebound and no guarding  Gravid   Musculoskeletal: Normal range of motion  Neurological: She is alert and oriented to person, place, and time  Skin: Skin is warm and dry  Psychiatric: She has a normal mood and affect  Her behavior is normal    Vitals reviewed  Estimated Fetal Weight: 7 5 lbs  Presentation: vertex    SVE: 3 / 80% / -3  FHT:  150 / Moderate 6 - 25 bpm / Non reactive, No decelerations   [place]: q 2-3 min    Membranes: Intact    Assessment:   34 y o   at 39w6d admitted for labor  Plan:   1  Admit to L&D  2  CBC, RPR, type and screen  3  Expectant Management  Epidural upon request    Discussed with Dr Sam Castro MD, MPH  OB/GYN, PGY4  10/19/2019, 6:11 PM

## 2019-10-20 LAB
BASE EXCESS BLDCOA CALC-SCNC: -8.9 MMOL/L (ref 3–11)
BASE EXCESS BLDCOV CALC-SCNC: -4.8 MMOL/L (ref 1–9)
HCO3 BLDCOA-SCNC: 19.7 MMOL/L (ref 17.3–27.3)
HCO3 BLDCOV-SCNC: 20.4 MMOL/L (ref 12.2–28.6)
O2 CT VFR BLDCOA CALC: 11.2 ML/DL
OXYHGB MFR BLDCOA: 46.2 %
OXYHGB MFR BLDCOV: 57.2 %
PCO2 BLDCOA: 52.5 MM[HG] (ref 30–60)
PCO2 BLDCOV: 38.6 MM HG (ref 27–43)
PH BLDCOA: 7.19 [PH] (ref 7.23–7.43)
PH BLDCOV: 7.34 [PH] (ref 7.19–7.49)
PO2 BLDCOA: 23.1 MM HG (ref 5–25)
PO2 BLDCOV: 23.1 MM HG (ref 15–45)
SAO2 % BLDCOV: 13.8 ML/DL

## 2019-10-20 PROCEDURE — 99024 POSTOP FOLLOW-UP VISIT: CPT | Performed by: OBSTETRICS & GYNECOLOGY

## 2019-10-20 PROCEDURE — 3E0234Z INTRODUCTION OF SERUM, TOXOID AND VACCINE INTO MUSCLE, PERCUTANEOUS APPROACH: ICD-10-PCS | Performed by: STUDENT IN AN ORGANIZED HEALTH CARE EDUCATION/TRAINING PROGRAM

## 2019-10-20 PROCEDURE — 82805 BLOOD GASES W/O2 SATURATION: CPT | Performed by: OBSTETRICS & GYNECOLOGY

## 2019-10-20 PROCEDURE — 59410 OBSTETRICAL CARE: CPT | Performed by: OBSTETRICS & GYNECOLOGY

## 2019-10-20 RX ORDER — DOCUSATE SODIUM 100 MG/1
100 CAPSULE, LIQUID FILLED ORAL 2 TIMES DAILY
Status: DISCONTINUED | OUTPATIENT
Start: 2019-10-20 | End: 2019-10-22 | Stop reason: HOSPADM

## 2019-10-20 RX ORDER — IBUPROFEN 600 MG/1
600 TABLET ORAL EVERY 6 HOURS PRN
Status: DISCONTINUED | OUTPATIENT
Start: 2019-10-20 | End: 2019-10-20

## 2019-10-20 RX ORDER — SIMETHICONE 80 MG
80 TABLET,CHEWABLE ORAL 4 TIMES DAILY PRN
Status: DISCONTINUED | OUTPATIENT
Start: 2019-10-20 | End: 2019-10-22 | Stop reason: HOSPADM

## 2019-10-20 RX ORDER — BUPIVACAINE HYDROCHLORIDE 2.5 MG/ML
INJECTION, SOLUTION INFILTRATION; PERINEURAL AS NEEDED
Status: DISCONTINUED | OUTPATIENT
Start: 2019-10-20 | End: 2019-10-20 | Stop reason: SURG

## 2019-10-20 RX ORDER — OXYTOCIN/RINGER'S LACTATE 30/500 ML
1-30 PLASTIC BAG, INJECTION (ML) INTRAVENOUS
Status: DISCONTINUED | OUTPATIENT
Start: 2019-10-20 | End: 2019-10-20

## 2019-10-20 RX ORDER — OXYCODONE HYDROCHLORIDE 5 MG/1
5 TABLET ORAL EVERY 4 HOURS PRN
Status: DISCONTINUED | OUTPATIENT
Start: 2019-10-20 | End: 2019-10-22 | Stop reason: HOSPADM

## 2019-10-20 RX ORDER — DIPHENHYDRAMINE HCL 25 MG
25 TABLET ORAL EVERY 6 HOURS PRN
Status: DISCONTINUED | OUTPATIENT
Start: 2019-10-20 | End: 2019-10-22 | Stop reason: HOSPADM

## 2019-10-20 RX ORDER — CALCIUM CARBONATE 200(500)MG
1000 TABLET,CHEWABLE ORAL DAILY PRN
Status: DISCONTINUED | OUTPATIENT
Start: 2019-10-20 | End: 2019-10-22 | Stop reason: HOSPADM

## 2019-10-20 RX ORDER — ACETAMINOPHEN 325 MG/1
650 TABLET ORAL EVERY 4 HOURS PRN
Status: DISCONTINUED | OUTPATIENT
Start: 2019-10-20 | End: 2019-10-22 | Stop reason: HOSPADM

## 2019-10-20 RX ORDER — CLONIDINE 100 UG/ML
INJECTION, SOLUTION EPIDURAL AS NEEDED
Status: DISCONTINUED | OUTPATIENT
Start: 2019-10-20 | End: 2019-10-20 | Stop reason: SURG

## 2019-10-20 RX ORDER — BISACODYL 10 MG
10 SUPPOSITORY, RECTAL RECTAL DAILY PRN
Status: DISCONTINUED | OUTPATIENT
Start: 2019-10-20 | End: 2019-10-22 | Stop reason: HOSPADM

## 2019-10-20 RX ORDER — CYCLOBENZAPRINE HCL 10 MG
10 TABLET ORAL 3 TIMES DAILY PRN
Status: DISCONTINUED | OUTPATIENT
Start: 2019-10-20 | End: 2019-10-22 | Stop reason: HOSPADM

## 2019-10-20 RX ORDER — DIAPER,BRIEF,INFANT-TODD,DISP
1 EACH MISCELLANEOUS AS NEEDED
Status: DISCONTINUED | OUTPATIENT
Start: 2019-10-20 | End: 2019-10-22 | Stop reason: HOSPADM

## 2019-10-20 RX ORDER — ONDANSETRON 2 MG/ML
4 INJECTION INTRAMUSCULAR; INTRAVENOUS EVERY 8 HOURS PRN
Status: DISCONTINUED | OUTPATIENT
Start: 2019-10-20 | End: 2019-10-22 | Stop reason: HOSPADM

## 2019-10-20 RX ORDER — OXYCODONE HYDROCHLORIDE 10 MG/1
10 TABLET ORAL EVERY 4 HOURS PRN
Status: DISCONTINUED | OUTPATIENT
Start: 2019-10-20 | End: 2019-10-22 | Stop reason: HOSPADM

## 2019-10-20 RX ADMIN — OXYCODONE HYDROCHLORIDE 10 MG: 10 TABLET ORAL at 17:17

## 2019-10-20 RX ADMIN — Medication 2 UNITS: at 06:05

## 2019-10-20 RX ADMIN — CLONIDINE 100 MCG: 100 INJECTION, SOLUTION EPIDURAL at 10:56

## 2019-10-20 RX ADMIN — CLONIDINE 50 MCG: 100 INJECTION, SOLUTION EPIDURAL at 14:07

## 2019-10-20 RX ADMIN — SODIUM CHLORIDE, SODIUM LACTATE, POTASSIUM CHLORIDE, AND CALCIUM CHLORIDE 125 ML/HR: .6; .31; .03; .02 INJECTION, SOLUTION INTRAVENOUS at 11:27

## 2019-10-20 RX ADMIN — SODIUM CHLORIDE, SODIUM LACTATE, POTASSIUM CHLORIDE, AND CALCIUM CHLORIDE 125 ML/HR: .6; .31; .03; .02 INJECTION, SOLUTION INTRAVENOUS at 05:52

## 2019-10-20 RX ADMIN — BUPIVACAINE HYDROCHLORIDE 9 ML: 2.5 INJECTION, SOLUTION INFILTRATION; PERINEURAL at 10:56

## 2019-10-20 RX ADMIN — BENZOCAINE AND LEVOMENTHOL: 200; 5 SPRAY TOPICAL at 17:18

## 2019-10-20 RX ADMIN — CLONIDINE 100 MCG: 100 INJECTION, SOLUTION EPIDURAL at 06:47

## 2019-10-20 RX ADMIN — HYDROCORTISONE 1 APPLICATION: 1 CREAM TOPICAL at 17:18

## 2019-10-20 RX ADMIN — BUPIVACAINE HYDROCHLORIDE 9 ML: 2.5 INJECTION, SOLUTION INFILTRATION; PERINEURAL at 06:47

## 2019-10-20 RX ADMIN — ROPIVACAINE HYDROCHLORIDE: 5 INJECTION, SOLUTION EPIDURAL; INFILTRATION; PERINEURAL at 11:47

## 2019-10-20 RX ADMIN — WITCH HAZEL 1 PAD: 500 SOLUTION RECTAL; TOPICAL at 17:18

## 2019-10-20 RX ADMIN — SODIUM CHLORIDE 2.5 MILLION UNITS: 9 INJECTION, SOLUTION INTRAVENOUS at 11:27

## 2019-10-20 RX ADMIN — ONDANSETRON 4 MG: 2 INJECTION INTRAMUSCULAR; INTRAVENOUS at 17:25

## 2019-10-20 RX ADMIN — CYCLOBENZAPRINE HYDROCHLORIDE 10 MG: 10 TABLET, FILM COATED ORAL at 18:13

## 2019-10-20 RX ADMIN — BUPIVACAINE HYDROCHLORIDE 5 ML: 2.5 INJECTION, SOLUTION INFILTRATION; PERINEURAL at 14:07

## 2019-10-20 RX ADMIN — SODIUM CHLORIDE 2.5 MILLION UNITS: 9 INJECTION, SOLUTION INTRAVENOUS at 05:52

## 2019-10-20 RX ADMIN — OXYCODONE HYDROCHLORIDE 10 MG: 10 TABLET ORAL at 21:20

## 2019-10-20 NOTE — OB LABOR/OXYTOCIN SAFETY PROGRESS
Oxytocin Safety Progress Check Note - Faustino Sanchez 34 y o  female MRN: [id number]    Unit/Bed#: -01 Encounter: [id number]    Dose (lety-units/min) Oxytocin: 8 lety-units/min  Contraction Frequency (minutes): 2-3 5  Contraction Quality: Moderate  Tachysystole: No   Dilation: Lip/rim (Comment)        Effacement (%): 90  Station: 1  Baseline Rate: 165 bpm  Fetal Heart Rate: 156 BPM  FHR Category: Category II             Notes/comments:   Evaluated patient after 2 hours, found to have made change to 9 5cm  Having some nausea and increased pressure   Will continue to monitor, and start pushing when appropriate    Selena Dumont MD 10/20/2019 11:53 AM

## 2019-10-20 NOTE — OB LABOR/OXYTOCIN SAFETY PROGRESS
Oxytocin Safety Progress Check Note - Paola Craft 34 y o  female MRN: [id number]    Unit/Bed#: -01 Encounter: [id number]    Dose (lety-units/min) Oxytocin: 10 lety-units/min  Contraction Frequency (minutes): 2-5 5  Contraction Quality: Moderate  Tachysystole: No   Dilation: 6        Effacement (%): 90  Station: -2  Baseline Rate: 150 bpm  Fetal Heart Rate: 151 BPM  FHR Category: Category II             Notes/comments:   Evaluated patient in the setting of a prolonged variable deceleration, found to be unchanged on exam  Patient had attempted being in a different position in bed  FHT recovered with repositioning, fluids, and O2  Will internalize patient to determine adequacy of contractions and continue pitocin titration      Discussed with Dr Mya Moore MD 10/20/2019 9:05 AM

## 2019-10-20 NOTE — OB LABOR/OXYTOCIN SAFETY PROGRESS
Oxytocin Safety Progress Check Note - Paola Craft 34 y o  female MRN: [id number]    Unit/Bed#: -01 Encounter: [id number]    Dose (lety-units/min) Oxytocin: 6 lety-units/min  Contraction Frequency (minutes): 2-4  Contraction Quality: Moderate  Tachysystole: No   Dilation: 6        Effacement (%): 90  Station: -2  Baseline Rate: 155 bpm  Fetal Heart Rate: 150 BPM  FHR Category: Category I             Notes/comments:   Evaluated patient 2 hours after pitocin initiated, comfortable with epidural  Found to have made change to 6cm  Will continue to monitor and re-evaluate per protocol      Yael Hendrickson MD 10/20/2019 7:43 AM

## 2019-10-20 NOTE — OB LABOR/OXYTOCIN SAFETY PROGRESS
Oxytocin Safety Progress Check Note - Yobany Espinal 34 y o  female MRN: [id number]    Unit/Bed#: -01 Encounter: [id number]    Dose (lety-units/min) Oxytocin: 6 lety-units/min  Contraction Frequency (minutes): 1 5-5 5  Contraction Quality: Moderate  Tachysystole: No   Dilation: Lip/rim (Comment)        Effacement (%): 100  Station: 2  Baseline Rate: 155 bpm  Fetal Heart Rate: 156 BPM  FHR Category: Category II             Notes/comments:   Cervical change occurring  Hold pitocin at 6 millunits/min   Category FHT baseline 150's, occasional variable decelerations, has responded to position change  Variability is moderate  Plan to recheck in one hour  If not complete plan for csection        Chente Heath MD 10/20/2019 1:40 PM

## 2019-10-20 NOTE — DISCHARGE INSTRUCTIONS
Vaginal Delivery   AMBULATORY CARE:   What you need to know about vaginal delivery:  A vaginal delivery occurs when your baby is born through your vagina (birth canal)  How to prepare for vaginal delivery: You will not be able to eat while you are in active labor  Your healthcare provider can give you medicines for pain relief if you chose to have them  You may need medicine to induce (start) your labor if your labor is not moving forward  You may need to move in bed, stand, or walk to help your baby move into position for birth  What will happen during vaginal delivery:   · You can move into several positions during delivery  You can lie on your back, have your feet up in stirrups, or squat  You may feel pressure on your rectum  This pressure is caused by the movement of your baby's head down the birth canal  You may feel the urge to push  Your healthcare provider will tell you when to push, and guide your baby out of the birth canal  Healthcare providers may use forceps or suction to help deliver your baby  You may also need an episiotomy (incision) to make the vaginal opening larger  This will make more room for your baby  · After your baby is born, your healthcare provider will put clamps on the cord that connects your baby to the placenta  The cord is then cut  Your uterus continues to contract after delivery to push out the placenta  Your healthcare provider may close your episiotomy incision or any tears with stitches  What will happen after vaginal delivery:   · Healthcare providers will examine your baby  You may be able to hold your baby soon after he or she is born  After healthcare providers have checked that you and your baby are okay, you may be taken to another room  · A healthcare provider may massage your abdomen several times to make your uterus firm  This can be uncomfortable   You may have abdominal pains for up to 3 days after you give birth because your uterus is still dory  The contractions help release blood from inside your uterus so it shrinks back to its normal size  These contractions may hurt more while you breastfeed your baby  · Your healthcare provider may suggest you get out of bed to sit in a chair or walk  Activity can help prevent blood clots  · You may be able to go home within 24 to 48 hours after delivery  If you need support at home, ask your healthcare provider about home visits by another healthcare provider  This healthcare provider can help you learn about breastfeeding, bottle feeding, baby care, and perineum care  Follow up with your healthcare provider:  Most women need to return 6 weeks after a vaginal delivery  Ask your healthcare provider how to care for your wounds or stitches, if you have them  Write down your questions so you remember to ask them during your visits  Seek care immediately if:   · Your leg feels warm, tender, and painful  It may look swollen and red  · You have a fever  · You are urinating very little, or not at all  · You have heavy vaginal bleeding that fills 1 or more sanitary pads in 1 hour  · You feel weak, dizzy, or faint  Contact your healthcare provider if:   · Your abdominal or perineal pain does not go away, or gets worse  · You feel depressed  · You have questions or concerns about your condition or care  Medicines:  · NSAIDs , such as ibuprofen, help decrease swelling, pain, and fever  This medicine is available with or without a doctor's order  NSAIDs can cause stomach bleeding or kidney problems in certain people  If you take blood thinner medicine, always ask your healthcare provider if NSAIDs are safe for you  Always read the medicine label and follow directions  · Stool softeners  make it easier for you to have a bowel movement  You may need this medicine to treat or prevent constipation  · Take your medicine as directed    Contact your healthcare provider if you think your medicine is not helping or if you have side effects  Tell him or her if you are allergic to any medicine  Keep a list of the medicines, vitamins, and herbs you take  Include the amounts, and when and why you take them  Bring the list or the pill bottles to follow-up visits  Carry your medicine list with you in case of an emergency  Activity:  Rest as much as possible  Try to keep all activities short  You may be able to do some exercise soon after you have your baby  Talk with your healthcare provider before you start exercising  If you work outside the home, ask when you can return to your job  Kegel exercises:  Kegel exercises may help your vaginal and rectal muscles heal faster  You can do Kegel exercises by tightening and relaxing the muscles around your vagina  Kegel exercises help make the muscles stronger  Breast care:  When your milk comes in, your breasts may feel full and hard  Ask how to care for your breasts, even if you are not breastfeeding  Constipation:  You may have constipation for a period of time after you have your baby  Do not try to push the bowel movement out if it is too hard  High-fiber foods and extra liquids can help you prevent constipation  Examples of high-fiber foods are fruit and bran  Prune juice and water are good liquids to drink  You may also be told to take over-the-counter fiber and stool softener medicines  Take these items as directed  Ask how to prevent or treat hemorrhoids  Perineum care: Your perineum is the area between your vagina and anus  Keep the area clean and dry  This will help it heal and prevent infection  Wash the area gently with soap and water when you bathe or shower  Rinse your perineum with warm water after you urinate or have a bowel movement  Your healthcare provider may suggest you use a warm sitz bath to help decrease pain  To take a sitz bath, fill a bathtub with 4 to 6 inches of warm water   You may also use a sitz bath pan that fits inside the toilet  Sit in the sitz bath for 20 minutes  Do this 2 to 3 times a day, or as directed  The warm water can help decrease pain and swelling  Vaginal discharge: You will have vaginal discharge, called lochia, after your delivery  The lochia is red or dark brown with clots for 1 to 3 days after the birth  The amount will decrease and turn pale pink or brown for 3 to 10 days  It will turn white or yellow on the 10th or 14th day  Lochia is usually gone within 3 weeks  Use a sanitary pad rather than a tampon to prevent a vaginal infection  You will have lochia for up to 3 weeks after your baby is born  Monthly periods: Your period may start again within 7 to 9 weeks after your baby is born  If you are breastfeeding, it may take longer for your period to start again  You can still get pregnant again even though you do not have your monthly period  Talk with your healthcare provider about a birth control method if you do not want to get pregnant  Mood changes: Many new mothers have some kind of mood changes after delivery  Some of these changes occur because of lack of sleep, hormone changes, and caring for a new baby  Some mood changes can be more serious, such as postpartum depression  Talk with your healthcare provider if you feel unable to care for yourself or your baby  Sexual activity:  Do not have sex until your healthcare provider says it is okay  You may notice you have a decreased desire for sex, or sex may be painful  You may need to use a vaginal lubricant (gel) to help make sex more comfortable  © 2017 [address]  Information is for End User's use only and may not be sold, redistributed or otherwise used for commercial purposes  All illustrations and images included in CareNotes® are the copyrighted property of [employer] , [employer]  or En[employer] Pena  The above information is an  only  It is not intended as medical advice for individual conditions or treatments   Talk to your doctor, nurse or pharmacist before following any medical regimen to see if it is safe and effective for you  Breast Care for the Breast Feeding Mother   WHAT YOU SHOULD KNOW:   Your breasts will go through normal changes while you are breastfeeding  Sometimes breast and nipple problems can develop while you are breastfeeding  Learn about changes that are normal and those that may be a problem  Breast care can help you prevent and manage problems so you and your baby can enjoy the benefits of breastfeeding  INSTRUCTIONS:   Breast changes while you are breastfeeding:   · For the first few days after your baby is born, your body makes a small amount of breast milk (colostrum)  Within about 2 to 5 days, your body will begin making mature milk  It may take up to 10 days or longer for mature milk to come in  When your mature milk comes in, your breasts will become full and firm  They may feel tender  · Breastfeeding your baby will decrease the full feeling in your breasts  You may feel a tingly sensation during feedings as milk is released from your breasts  This is called the milk let-down reflex  After 7 or more days, the fullness may feel like it has decreased  Your nipples should look the same as they did before you started breastfeeding  Breasts that feel full before and empty after breastfeeding are signs that breastfeeding is going well  Breast problems that can occur while you are breastfeeding:   · Nipple soreness  may occur when you begin to breastfeed your baby  You may also have nipple soreness if your baby is not latched on to your breast correctly  Correct positioning and latch-on may decrease or stop the pain in your nipples  Work with your caregivers to help your baby latch on correctly  It may also be helpful to place warm, wet compresses on your nipples to help decrease pain  · Plugged milk ducts  may cause painful breast lumps   Plugged ducts may be caused by not emptying your breasts completely during feedings  When your baby pauses during breastfeeding, massage and gently squeeze your breast  Gentle massage may unplug a blocked milk duct  Pump out any milk left in your breasts after your baby is done breastfeeding  Avoid wearing tight tops, tight bras, or under-wire bras, because they may put pressure on your breasts  · Engorgement  may occur as your milk comes in soon after you begin breastfeeding  Engorgement may cause your breasts to become swollen and painful  Your breasts may also become engorged if you miss a feeding or you do not breastfeed on demand  The best way to decrease engorgement symptoms is to empty your breasts by feeding your baby often  Engorgement can make it hard for your baby to latch on to your breast  If this happens, express a small amount of milk and then have your baby latch on  Cold compresses, gel packs, or ice packs on your breasts can help decrease pain and swelling  Ask your caregiver how often and how long you should use cold, or ice packs  · A breast infection called mastitis  can develop if you have plugged milk ducts or engorgement  Mastitis causes your breasts to become red, swollen, and painful  You may also have flu-like symptoms, such as chills and a fever  Place heat on your breasts to help decrease the pain  You may want to place a moist, warm cloth on the painful breast or both of your breasts  Ask how often to do this  Your primary healthcare provider [facility]) may suggest that you take an NSAID, such as ibuprofen, to decrease pain and swelling  He may also order antibiotics to treat mastitis  Ask about feeding your baby when you have a breast infection  How to help prevent or manage breast problems while you are breastfeeding:   · Learn how to position your baby and latch him on correctly  To latch your baby correctly to your breast, make sure that his mouth covers most of your areola (dark area around your nipple)   He should not be attached only to the nipple  Your baby is latched on well if you feel comfortable and do not feel pain  A correct latch helps him get enough milk and can help to prevent sore nipples and other breast problems  There are several breastfeeding positions that you can try  Find the position that works best for you and your baby  Ask your caregiver for more information about how to hold and breastfeed your baby  · Prevent biting  Your baby may get teeth at about 1to 3months of age  To help prevent biting, break his suction once he is finished or if he has fallen asleep  To break his suction, slip a finger into the side of his mouth  If your baby bites you, respond with surprise or unhappiness  Offer praise when he does not bite you  · Breastfeed your baby regularly  Feed your baby 8 to 12 times a day  You may need to wake up your baby at night to feed him  It is okay to feed from 1 or both breasts at each feeding  Your baby should breastfeed from both breasts equally over the course of a day  If your baby only feeds from 1 side during a feeding, offer your other breast to him first for the next feeding  · Schedule and keep follow-up visits  Talk to your baby's pediatrician or your PHP during follow-up visits if you have breast problems  Caregivers may suggest that you, or you and your partner, attend classes on breastfeeding  You also may want to join a breastfeeding support group  Caregivers may suggest that you see a lactation consultant  This is a caregiver who can help you with breastfeeding  Contact your PHP if:   · You have a fever and chills  · You have body aches and you feel like you do not have any energy  · One or both of your breasts is red, swollen or hard, painful, and feels warm or hot  · You have breast engorgement that does not get better within 24 hours  · You see or feel a lump in your breast that hurts when you touch it      · You have nipple pain during breastfeeding or between feedings  · Your nipples are red, dry, cracked, bleeding, or they have scabs on them  · You have questions or concerns about your condition or care  2014 [address] is for End User's use only and may not be sold, redistributed or otherwise used for commercial purposes  All illustrations and images included in CareNotes® are the copyrighted property of A D A M [employer]  or Enrico Pena  The above information is an  only  It is not intended as medical advice for individual conditions or treatments  Talk to your doctor, nurse or pharmacist before following any medical regimen to see if it is safe and effective for you  Postpartum Bleeding   WHAT YOU NEED TO KNOW:   Postpartum bleeding is vaginal bleeding after childbirth  This bleeding is normal, whether your baby was born vaginally or by   It contains blood and the tissue that lined the inside of your uterus when you were pregnant  DISCHARGE INSTRUCTIONS:   What to expect with postpartum bleeding:  Postpartum bleeding usually lasts at least 10 days, and may last longer than 6 weeks  Your bleeding may range from light (barely staining a pad) to heavy (soaking a pad in 1 hour)  Usually, you have heavy bleeding right after childbirth, which slows over the next few weeks until it stops  The bleeding is red or dark brown with clots for the first 1 to 3 days  It then turns pink for several days, and then becomes a white or yellow discharge until it ends  Follow up with your obstetrician as directed:  Do not have sex until your obstetrician says it is okay  Write down your questions so you remember to ask them during your visits  Contact your healthcare provider or obstetrician if:   · Your bleeding increases, or you have heavy bleeding that soaks 1 pad in 1 hour for 2 hours in a row  · You pass large blood clots      · You are breathing faster than normal, or your heart is beating faster than normal     · You are urinating less than usual, or not at all  · You feel dizzy  · You have questions or concerns about your condition or care  Return to the emergency department if:   · You are suddenly short of breath and feel lightheaded  · You have sudden chest pain  © 2017 [address] Information is for End User's use only and may not be sold, redistributed or otherwise used for commercial purposes  All illustrations and images included in CareNotes® are the copyrighted property of [employer] , [employer]  or Enrico Pena  The above information is an  only  It is not intended as medical advice for individual conditions or treatments  Talk to your doctor, nurse or pharmacist before following any medical regimen to see if it is safe and effective for you

## 2019-10-20 NOTE — ANESTHESIA PROCEDURE NOTES
CSE Block    Patient location during procedure: OB  Start time: 10/19/2019 8:28 PM  Reason for block: at surgeon's request and post-op pain management  Staffing  Anesthesiologist: Anuj Corado MD  Performed: anesthesiologist   Preanesthetic Checklist  Completed: patient identified, site marked, surgical consent, pre-op evaluation, timeout performed, IV checked, risks and benefits discussed and monitors and equipment checked  CSE  Patient position: sitting  Prep: Betadine  Patient monitoring: heart rate, cardiac monitor, continuous pulse ox and frequent blood pressure checks  Approach: midline  Spinal Needle  Needle type: pencil-tip   Needle gauge: 27 G  Needle length: 10 cm  Epidural Needle  Injection technique: BLAS saline  Needle type: Tuohy   Needle gauge: 18 G  Needle insertion depth: 7 cm  Location: lumbar (1-5)  Catheter  Catheter type: side hole  Catheter size: 20 G  Catheter at skin depth: 13 cm  Test dose: negative  Assessment  Sensory level: T10  Injection Assessment:  negative aspiration for heme and no pain on injection

## 2019-10-20 NOTE — ANESTHESIA POSTPROCEDURE EVALUATION
Post-Op Assessment Note    CV Status:  Stable    Pain management: adequate     Mental Status:  Alert and awake   Hydration Status:  Euvolemic   PONV Controlled:  Controlled   Airway Patency:  Patent   Post Op Vitals Reviewed: Yes      Staff: Anesthesiologist     Post-op block assessment: catheter intact        /66 (10/20/19 1646)    Temp      Pulse (!) 107 (10/20/19 1646)   Resp      SpO2

## 2019-10-20 NOTE — OB LABOR/OXYTOCIN SAFETY PROGRESS
Oxytocin Safety Progress Check Note - Eric Hardin 34 y o  female MRN: [id number]    Unit/Bed#: -01 Encounter: [id number]    Dose (lety-units/min) Oxytocin: 14 lety-units/min  Contraction Frequency (minutes): 1 5-2 5  Contraction Quality: Moderate  Tachysystole: No   Dilation: 8        Effacement (%): 90  Station: 0  Baseline Rate: 155 bpm  Fetal Heart Rate: 156 BPM  FHR Category: Category II             Notes/comments:   Evaluated patient after 2 hours  Found to be changed to 8cm, will have decelerations to a ruben of 90 when she attempts repositioning  Otherwise comfortable  Persistent Category II tracing       Kaylee Eisenberg MD 10/20/2019 11:09 AM

## 2019-10-20 NOTE — ANESTHESIA PREPROCEDURE EVALUATION
Review of Systems/Medical History          Cardiovascular   Pulmonary  Smoker ex-smoker  ,        GI/Hepatic    GERD ,             Endo/Other    Obesity    GYN       Hematology  Anemia ,     Musculoskeletal       Neurology   Psychology           Physical Exam    Airway    Mallampati score: II         Dental   No notable dental hx     Cardiovascular      Pulmonary      Other Findings        Anesthesia Plan  ASA Score- 2     Anesthesia Type- epidural with ASA Monitors  Additional Monitors:   Airway Plan:     Comment: I, Dr Susanne Barbosa, the attending physician, have personally seen and evaluated the patient prior to anesthetic care  I have reviewed the pre-anesthetic record, and other medical records if appropriate to the anesthetic care  If a CRNA is involved in the case, I have reviewed the CRNA assessment, if present, and agree  The patient is in a suitable condition to proceed with my formulated anesthetic plan        Plan Factors-    Induction- intravenous  Postoperative Plan-     Informed Consent- Anesthetic plan and risks discussed with patient  I personally reviewed this patient with the CRNA  Discussed and agreed on the Anesthesia Plan with the CRNA  Kwadwoletta Pac

## 2019-10-20 NOTE — OB LABOR/OXYTOCIN SAFETY PROGRESS
Labor Progress Note - Antoinette Pacheco 34 y o  female MRN: [id number]    Unit/Bed#: -01 Encounter: [id number]       Contraction Frequency (minutes): 1-2 5  Contraction Quality: Moderate  Tachysystole: No   Dilation: 5        Effacement (%): 90  Station: -2  Baseline Rate: 140 bpm  Fetal Heart Rate: 144 BPM  FHR Category: Category I             Notes/comments:   Pt feeling more pain, pressure  Cervical exam unchanged  Will have anesthesia re-evaluate epidural    FHR continues to be category I  Will recheck in 2 hours       Judy Rivera MD 10/20/2019 12:19 AM

## 2019-10-20 NOTE — DISCHARGE SUMMARY
Discharge Summary - Paola Craft 34 y o  female MRN: [id number]    Unit/Bed#: -01 Encounter: [id number]    Admission Date: 10/19/2019     Discharge Date: 10/22/2019    Admitting Diagnosis:   Patient Active Problem List   Diagnosis    History of abnormal cervical Papanicolaou smear    Pap smear of cervix with ASCUS, cannot exclude HGSIL    Anemia during pregnancy in third trimester    Supervision of high risk pregnancy in third trimester    History of bariatric surgery    Anemia    39 weeks gestation of pregnancy    Pregnancy affected by previous bariatric surgery, currently in third trimester    Rh negative state in antepartum period    Maternal obesity affecting pregnancy, antepartum    Subcutaneous cyst    GBS (group B Streptococcus carrier), +RV culture, currently pregnant     (spontaneous vaginal delivery)       Discharge Diagnosis:   Same, delivered    Procedures:   spontaneous vaginal delivery    Admitting Attending: Dr German Hou  Delivery Attending: Dr Genesis rBay  Discharge Attending: Dr Michelle Hernandez:     Paola Craft is a 34 y o   who was admitted in labor  She received penicillin for GBS carrier status  She was started on pitocin for augmentation, and progressed to fully dilated at 1414 on 10/20/19  She then underwent an uncomplicated spontaneous vaginal delivery and delivered a viable female  at 56  APGARS were 8, 9 at 1 and 5 minutes, respectively   weighed 9lb 12oz  Patient tolerated the procedure well and was transferred to postpartum in stable condition  The patient's post partum course was unremarkable except for back pain treated with Flexeril  On day of discharge, she was ambulating and able to reasonably perform all ADLs  She was voiding and had appropriate bowel function  Pain was well controlled  She was discharged home on postpartum day #2 without complications   Patient was instructed to follow up with her OB as an outpatient and was given appropriate warnings to call provider if she develops signs of infection or uncontrolled pain  Condition at discharge:   fair     Disposition:   Home    Planned Readmission:   No    Discharge Medications:   Please see after visit summary for full list of discharge medications  Discharge instructions :   -Do not place anything (no partner, tampons or douche) in your vagina for 6 weeks  -You may walk for exercise for the first 6 weeks then gradually return to your usual activities    -Please do not drive for 1 week if you have no stitches and for 2 weeks if you have stitches or underwent a  delivery     -You may take baths or shower per your preference    -Please look at your bust (breasts) in the mirror daily and call provider for redness or tenderness or increased warmth  - If you have had a  please look at your incision daily as well and call provider for increasing redness or steady drainage from the incision    -Please call your provider if temperature > 100 4*F or 38* C, worsening pain or a foul discharge  Keara Yousif MD  OB/GYN  10/22/2019  6:36 AM

## 2019-10-20 NOTE — PLAN OF CARE
Problem: Labor & Delivery  Goal: Manages discomfort  Description  Assess and monitor for signs and symptoms of discomfort  Assess patient's pain level regularly and per hospital policy  Administer medications as ordered  Support use of nonpharmacological methods to help control pain such as distraction, imagery, relaxation, and application of heat and cold  Collaborate with interdisciplinary team and patient to determine appropriate pain management plan  1  Include patient in decisions related to comfort  2  Offer non-pharmacological pain management interventions  3  Report ineffective pain management to physician  Outcome: Progressing  Goal: Patient vital signs are stable  Description  1  Assess vital signs - vaginal delivery    Outcome: Progressing     Problem: BIRTH - VAGINAL/ SECTION  Goal: Fetal and maternal status remain reassuring during the birth process  Description  INTERVENTIONS:  - Monitor vital signs  - Monitor fetal heart rate  - Monitor uterine activity  - Monitor labor progression (vaginal delivery)  - DVT prophylaxis  - Antibiotic prophylaxis  Outcome: Progressing  Goal: Emotionally satisfying birthing experience for mother/fetus  Description  Interventions:  - Assess, plan, implement and evaluate the nursing care given to the patient in labor  - Advocate the philosophy that each childbirth experience is a unique experience and support the family's chosen level of involvement and control during the labor process   - Actively participate in both the patient's and family's teaching of the birth process  - Consider cultural, Islam and age-specific factors and plan care for the patient in labor  Outcome: Progressing

## 2019-10-20 NOTE — QUICK NOTE
Evaluated patient postpartum for complaint of severe back pain  Vitals:    10/20/19 1517 10/20/19 1631 10/20/19 1646 10/20/19 1716   BP: 105/63 143/67 142/66 108/57   BP Location:       Pulse: 101 (!) 107 (!) 107 100   Resp:       Temp: 100 3 °F (37 9 °C)      TempSrc: Oral      SpO2:       Weight:       Height:         Patient appears in acute distress, but able to calm down intermittently and speak clearly, demonstrating area of pain  She stated she had back pain during much of her pregnancy, but it became worse during labor  She has been having the back pain on her right lower back in a localized area, and it has been present during much of her labor, and worsened after removal of epidural  Pain is described as sharp and stabbing, relieved by compression of the area  Physical Exam   Musculoskeletal:        Arms:  Pain improved with compression of region    In some distress, writhing in bed, but able to calm down and answer questions  Vaginal exam: no hematoma, no laceration, no swelling  Small clots removed from vaginal vault on bimanual exam, uterus firm      Back pain appears more consistent with musculoskeletal pain at this time, given localized nature and improvement with palpation  Will initiate flexeril at this time and continue to monitor and re-evaluate      Evert Arceo MD  Ob/Gyn PGY-1  10/20/19 5:50 PM

## 2019-10-20 NOTE — OB LABOR/OXYTOCIN SAFETY PROGRESS
Labor Progress Note - Reena Layman 34 y o  female MRN: [id number]    Unit/Bed#: -01 Encounter: [id number]       Contraction Frequency (minutes): 1 5-2 5  Contraction Quality: Moderate  Tachysystole: No   Dilation: 5        Effacement (%): 90  Station: -2  Baseline Rate: 150 bpm  Fetal Heart Rate: 156 BPM  FHR Category: Category I             Notes/comments:   Pt comfortable with epidural  Starting to feel some pressure  Cervical exam unchanged  AROM for clear fluid at 10:26pm    FHR cat I  Will recheck in 2 hours       Ray Martins MD 10/19/2019 10:27 PM

## 2019-10-20 NOTE — OB LABOR/OXYTOCIN SAFETY PROGRESS
Labor Progress Note - Cameron Shane 34 y o  female MRN: [id number]    Unit/Bed#: -01 Encounter: [id number]       Contraction Frequency (minutes): 2-3  Contraction Quality: Moderate  Tachysystole: No   Dilation: 5        Effacement (%): 90  Station: -2  Baseline Rate: 150 bpm  Fetal Heart Rate: 150 BPM  FHR Category: Category I             Notes/comments:    Pt sleeping at last 2 hour gabriel and at current 2 hour gabriel  Wishing not to be check right now and would like to wait until later this morning  FHT notable for moderate variability without accels, possible sleep cycle  [place] notable for mild irregular contractions  Will continue to monitor  Will recheck in 2 hours       Caitlyn Aldridge MD 10/20/2019 5:14 AM

## 2019-10-20 NOTE — L&D DELIVERY NOTE
Vaginal Delivery Summary - OB/GYN   Sami Monsivais 34 y o  female MRN: [id number]  Unit/Bed#: -01 Encounter: [id number]          Predelivery Diagnosis:  1  Pregnancy at 40w0d  2  History of catherine-en-y bypass    Postdelivery Diagnosis:  1  Same as above  2  Delivery of term     Procedure: Spontaneous Vaginal Delivery    Attending: Wilver    Assistant: Krunal    Anesthesia: Epidural    QBL: pending  Admission H 0  Admission platelets: 385    Complications: none apparent    Specimens: cord blood, arterial and venous cord blood gasses, placenta to storage    Findings:   1  Viable female at 1622, with APGARS of 8 and 9 at 1 and 5 minutes respectively,  2  Spontaneous delivery of intact placenta at 1626  3  Intact perineum not requiring repair  4  Blood gases:    Umbilical Cord Venous Blood Gas:  Results from last 7 days   Lab Units 10/20/19  1623   PH COV  7 341   PCO2 COV mm HG 38 6   HCO3 COV mmol/L 20 4   BASE EXC COV mmol/L -4 8*   O2 CT CD VB mL/dL 13 8   O2 HGB, VENOUS CORD % 03 6     Umbilical Cord Arterial Blood Gas:  Results from last 7 days   Lab Units 10/20/19  1623   PH COA  7 193*   PCO2 COA  52 5   PO2 COA mm HG 23 1   HCO3 COA mmol/L 19 7   BASE EXC COA mmol/L -8 9*   O2 CONTENT CORD ART ml/dl 11 2   O2 HGB, ARTERIAL CORD % 46 2       Disposition:  Patient tolerated the procedure well and was recovering in labor and delivery room     Brief history and labor course:  Ms Sami Monsivais is a 34 y o   at 40wk0d  She presented to labor and delivery in labor  She received pitocin and AROM for augmentation of labor, and progressed well to fully dilated  Description of procedure    Warm compresses were applied during pushing and perineal massage was performed  After pushing for 188 minutes, at 1622 patient delivered a viable female , wt pending, apgars of 8 (1 min) and 9 (5 min)  The fetal vertex delivered spontaneously, LOP position   Baby was checked for nuchal  None present  The anterior shoulder delivered atraumatically with maternal expulsive forces and the assistance of gentle downward traction  The posterior shoulder delivered with maternal expulsive forces and the assistance of gentle upward traction  The remainder of the fetus delivered spontaneously  Upon delivery, the infant was placed on the mothers abdomen and the cord was clamped and cut  Delayed cord clamping was performed  The infant was noted to cry spontaneously and had all signs of life  There was no evidence for injury  Awaiting nurse resuscitators evaluated the   Arterial and venous cord blood gases and cord blood was collected for analysis  These were promptly sent to the lab  In the immediate post-partum, 30 units of IV pitocin was administered, and the uterus was noted to contract down well with massage and pitocin  The placenta delivered spontaneously at 1626 and was noted to have a centrally inserted 3 vessel cord  The vagina, cervix, perineum, and rectum were inspected and there was noted to be an intact perineum  At the conclusion of the procedure, all needle, sponge, and instrument counts were noted to be correct  Madhavi Viola showed no retained sponges  Patient tolerated the procedure well and was allowed to recover in labor and delivery room with family and  before being transferred to the post-partum floor  Dr Kristian Friend was present and participated in all key portions of the case      Lizbet Funk MD  10/20/2019  5:56 PM

## 2019-10-21 LAB
ABO GROUP BLD: NORMAL
BLD GP AB SCN SERPL QL: NEGATIVE
FETAL CELL SCN BLD QL ROSETTE: NEGATIVE
RH BLD: NEGATIVE
RPR SER QL: NORMAL

## 2019-10-21 PROCEDURE — 86850 RBC ANTIBODY SCREEN: CPT | Performed by: OBSTETRICS & GYNECOLOGY

## 2019-10-21 PROCEDURE — 99024 POSTOP FOLLOW-UP VISIT: CPT | Performed by: OBSTETRICS & GYNECOLOGY

## 2019-10-21 PROCEDURE — 85461 HEMOGLOBIN FETAL: CPT | Performed by: OBSTETRICS & GYNECOLOGY

## 2019-10-21 PROCEDURE — 86900 BLOOD TYPING SEROLOGIC ABO: CPT | Performed by: OBSTETRICS & GYNECOLOGY

## 2019-10-21 PROCEDURE — 86901 BLOOD TYPING SEROLOGIC RH(D): CPT | Performed by: OBSTETRICS & GYNECOLOGY

## 2019-10-21 RX ADMIN — CYCLOBENZAPRINE HYDROCHLORIDE 10 MG: 10 TABLET, FILM COATED ORAL at 17:52

## 2019-10-21 RX ADMIN — DOCUSATE SODIUM 100 MG: 100 CAPSULE, LIQUID FILLED ORAL at 16:58

## 2019-10-21 RX ADMIN — CYCLOBENZAPRINE HYDROCHLORIDE 10 MG: 10 TABLET, FILM COATED ORAL at 05:09

## 2019-10-21 RX ADMIN — HUMAN RHO(D) IMMUNE GLOBULIN 300 MCG: 300 INJECTION, SOLUTION INTRAMUSCULAR at 18:25

## 2019-10-21 RX ADMIN — OXYCODONE HYDROCHLORIDE 5 MG: 5 TABLET ORAL at 21:37

## 2019-10-21 RX ADMIN — OXYCODONE HYDROCHLORIDE 10 MG: 10 TABLET ORAL at 10:10

## 2019-10-21 RX ADMIN — CYCLOBENZAPRINE HYDROCHLORIDE 10 MG: 10 TABLET, FILM COATED ORAL at 23:23

## 2019-10-21 RX ADMIN — OXYCODONE HYDROCHLORIDE 10 MG: 10 TABLET ORAL at 02:07

## 2019-10-21 RX ADMIN — BENZOCAINE AND LEVOMENTHOL: 200; 5 SPRAY TOPICAL at 16:58

## 2019-10-21 NOTE — LACTATION NOTE
This note was copied from a baby's chart  CONSULT - LACTATION  Baby Girl Fe Gutierrezh 1 days female MRN: [id number]    [facility] Room / Bed: (N)/ 323(N) Encounter: [id number]    Maternal Information     MOTHER:  Xochitl Severino  Maternal Age: 34 y o    OB History: #: 1, Date: 06, Sex: None, Weight: None, GA: None, Delivery: None, Apgar1: None, Apgar5: None, Living: None, Birth Comments: None    #: 2, Date: 17, Sex: None, Weight: None, GA: None, Delivery: None, Apgar1: None, Apgar5: None, Living: None, Birth Comments: None    #: 3, Date: 10/20/19, Sex: Female, Weight: 4423 g (9 lb 12 oz), GA: 40w0d, Delivery: Vaginal, Spontaneous, Apgar1: 8, Apgar5: 9, Living: Living, Birth Comments: None   Previouse breast reduction surgery? No    Lactation history:   Has patient previously breast fed: No   How long had patient previously breast fed:     Previous breast feeding complications:       Past Surgical History:   Procedure Laterality Date    APPENDECTOMY      BARIATRIC SURGERY      GASTRIC BYPASS          OVARIAN CYST REMOVAL             Birth information:  YOB: 2019   Time of birth: 4:22 PM   Sex: female   Delivery type: Vaginal, Spontaneous   Birth Weight: 4423 g (9 lb 12 oz)   Percent of Weight Change: -1%     Gestational Age: 37w0d   [unfilled]    Assessment     Breast and nipple assessment: normal assessment    Iola Assessment: normal assessment    Feeding assessment: feeding well  LATCH:  Latch: Grasps breast, tongue down, lips flanged, rhythmic sucking   Audible Swallowing: Spontaneous and intermittent (24 hours old)   Type of Nipple: Everted (After stimulation)   Comfort (Breast/Nipple): Soft/non-tender   Hold (Positioning): Partial assist, teach one side, mother does other, staff holds   LATCH Score: 9          Feeding recommendations:  breast feed on demand     Met with mother   Provided mother with Ready, Set, Baby booklet  Discussed Skin to Skin contact an benefits to mom and baby  Talked about the delay of the first bath until baby has adjusted  Spoke about the benefits of rooming in  Feeding on cue and what that means for recognizing infant's hunger  Avoidance of pacifiers for the first month discussed  Talked about exclusive breastfeeding for the first 6 months  Positioning and latch reviewed as well as showing images of other feeding positions  Discussed the properties of a good latch in any position  Reviewed hand/manual expression  Discussed s/s that baby is getting enough milk and some s/s that breastfeeding dyad may need further help  Gave information on common concerns, what to expect the first few weeks after delivery, preparing for other caregivers, and how partners can help  Resources for support also provided  Discussed 2nd night syndrome and ways to calm infant  Hand out given  Information on hand expression given  Discussed benefits of knowing how to manually express breast including stimulating milk supply, softening nipple for latch and evacuating breast in the event of engorgement  Worked on positioning infant up at chest level and starting to feed infant with nose arriving at the nipple  Then, using areolar compression to achieve a deep latch that is comfortable and exchanges optimum amounts of milk  Mom using nursing bra for support under breast  Infant is placed below breast in football position  Mom appears comfortable with supporting back of infant's neck and guiding baby to the breasts  Mom is concerned bc of large breasts and LGA infant she will be unable to breastfeed  Discussed pumping early and what to expect  Enc only breastfeeding until her milk comes in to achieve full benefits of colostrum and breast stimulation  Baby is latching very well  Enc parents to continue to call for lactation support as needed           Jerilyn Sanchez RN 10/21/2019 5:14 PM

## 2019-10-21 NOTE — PROGRESS NOTES
Progress Note - OB/GYN   Elizabeth Aburto 34 y o  female MRN: [id number]  Unit/Bed#: -01 Encounter: [id number]    Assessment:  Post partum Day #1 s/p , stable, baby in Nursery    Plan:  1) Post Partum Rhogam for Rh(-) mother with Rh(+ )  2) Continue routine post partum care  3) Encourage ambulation  4)Encourage breastfeeding  5) Anticipate discharge tomorrow     Subjective/Objective   Chief Complaint: None    Post delivery  Patient is doing well  Lochia WNL  Pain well controlled  Subjective:     Pain: yes, cramping, improved with meds  Tolerating PO: yes  Voiding: yes  Flatus: yes  BM: no  Ambulating: yes  Chest pain: no  Shortness of breath: no  Leg pain: no  Lochia: minimal    Objective:     Vitals: /68 (BP Location: Right arm)   Pulse 93   Temp 98 2 °F (36 8 °C) (Oral)   Resp 18   Ht 5' 5" (1 651 m)   Wt 106 kg (233 lb)   LMP 2019   SpO2 98%   Breastfeeding? Yes   BMI 38 77 kg/m²       Intake/Output Summary (Last 24 hours) at 10/21/2019 0557  Last data filed at 10/20/2019 2300  Gross per 24 hour   Intake 3563 3 ml   Output 1950 ml   Net 1613 3 ml       Lab Results   Component Value Date    WBC 27 37 (H) 10/19/2019    HGB 12 0 10/19/2019    HCT 36 5 10/19/2019    MCV 90 10/19/2019     10/19/2019       Physical Exam:     Gen: AAOx3, NAD  CV: RRR  Lungs: CTA b/l  Abd: Soft, non-tender, non-distended, no rebound or guarding  Uterine fundus firm and non-tender, 1 cm above the umbilicus     Ext: Non tender    Gaudenciophytos VIJI Rodriguez MD  10/21/2019  5:57 AM

## 2019-10-21 NOTE — PLAN OF CARE
Problem: POSTPARTUM  Goal: Experiences normal postpartum course  Description  INTERVENTIONS:  - Monitor maternal vital signs  - Assess uterine involution and lochia  Outcome: Progressing  Goal: Appropriate maternal -  bonding  Description  INTERVENTIONS:  - Identify family support  - Assess for appropriate maternal/infant bonding   -Encourage maternal/infant bonding opportunities  - Referral to  or  as needed  Outcome: Progressing  Goal: Establishment of infant feeding pattern  Description  INTERVENTIONS:  - Assess breast/bottle feeding  - Refer to lactation as needed  Outcome: Progressing  Goal: Incision(s), wounds(s) or drain site(s) healing without S/S of infection  Description  INTERVENTIONS  - Assess and document risk factors for skin impairment   - Assess and document dressing, incision, wound bed, drain sites and surrounding tissue  - Consider nutrition services referral as needed  - Oral mucous membranes remain intact  - Provide patient/ family education  Outcome: Progressing     Problem: PAIN - ADULT  Goal: Verbalizes/displays adequate comfort level or baseline comfort level  Description  Interventions:  - Encourage patient to monitor pain and request assistance  - Assess pain using appropriate pain scale  - Administer analgesics based on type and severity of pain and evaluate response  - Implement non-pharmacological measures as appropriate and evaluate response  - Consider cultural and social influences on pain and pain management  - Notify physician/advanced practitioner if interventions unsuccessful or patient reports new pain  Outcome: Progressing     Problem: INFECTION - ADULT  Goal: Absence or prevention of progression during hospitalization  Description  INTERVENTIONS:  - Assess and monitor for signs and symptoms of infection  - Monitor lab/diagnostic results     Outcome: Progressing  Goal: Absence of fever/infection during neutropenic period  Description  INTERVENTIONS:  - Monitor WBC    Outcome: Progressing     Problem: SAFETY ADULT  Goal: Patient will remain free of falls  Description  INTERVENTIONS:  - Assess patient frequently for physical needs  -  Identify cognitive and physical deficits and behaviors that affect risk of falls    -  [place] fall precautions as indicated by assessment   - Educate patient/family on patient safety including physical limitations  - Instruct patient to call for assistance with activity based on assessment  - Modify environment to reduce risk of injury  - Consider OT/PT consult to assist with strengthening/mobility  Outcome: Progressing  Goal: Maintain or return to baseline ADL function  Description  INTERVENTIONS:  -  Assess patient's ability to carry out ADLs; assess patient's baseline for ADL function and identify physical deficits which impact ability to perform ADLs (bathing, care of mouth/teeth, toileting, grooming, dressing, etc )  - Assess/evaluate cause of self-care deficits   - Assess range of motion  - Assess patient's mobility; develop plan if impaired  - Assess patient's need for assistive devices and provide as appropriate  - Encourage maximum independence but intervene and supervise when necessary  - Involve family in performance of ADLs  - Assess for home care needs following discharge   - Consider OT consult to assist with ADL evaluation and planning for discharge  - Provide patient education as appropriate  Outcome: Progressing  Goal: Maintain or return mobility status to optimal level  Description  INTERVENTIONS:  - Assess patient's baseline mobility status (ambulation, transfers, stairs, etc )    - Identify cognitive and physical deficits and behaviors that affect mobility  - Identify mobility aids required to assist with transfers and/or ambulation (gait belt, sit-to-stand, lift, walker, cane, etc )  - [place] fall precautions as indicated by assessment  - Record patient progress and toleration of activity level on Mobility SBAR; progress patient to next Phase/Stage  - Instruct patient to call for assistance with activity based on assessment  - Consider rehabilitation consult to assist with strengthening/weightbearing, etc   Outcome: Progressing     Problem: Knowledge Deficit  Goal: Patient/family/caregiver demonstrates understanding of disease process, treatment plan, medications, and discharge instructions  Description  Complete learning assessment and assess knowledge base    Interventions:  - Provide teaching at level of understanding  - Provide teaching via preferred learning methods  Outcome: Progressing     Problem: DISCHARGE PLANNING  Goal: Discharge to home or other facility with appropriate resources  Description  INTERVENTIONS:  - Identify barriers to discharge w/patient and caregiver  - Arrange for needed discharge resources and transportation as appropriate  - Identify discharge learning needs (meds, wound care, etc )  - Arrange for interpretive services to assist at discharge as needed  - Refer to Case Management Department for coordinating discharge planning if the patient needs post-hospital services based on physician/advanced practitioner order or complex needs related to functional status, cognitive ability, or social support system  Outcome: Progressing

## 2019-10-22 VITALS
SYSTOLIC BLOOD PRESSURE: 118 MMHG | DIASTOLIC BLOOD PRESSURE: 74 MMHG | OXYGEN SATURATION: 97 % | BODY MASS INDEX: 38.82 KG/M2 | RESPIRATION RATE: 18 BRPM | TEMPERATURE: 97.5 F | HEIGHT: 65 IN | WEIGHT: 233 LBS | HEART RATE: 80 BPM

## 2019-10-22 PROBLEM — O99.843 PREGNANCY AFFECTED BY PREVIOUS BARIATRIC SURGERY, CURRENTLY IN THIRD TRIMESTER: Status: RESOLVED | Noted: 2019-07-31 | Resolved: 2019-10-22

## 2019-10-22 PROBLEM — O99.210 MATERNAL OBESITY AFFECTING PREGNANCY, ANTEPARTUM: Status: RESOLVED | Noted: 2019-08-12 | Resolved: 2019-10-22

## 2019-10-22 PROBLEM — O09.93 SUPERVISION OF HIGH RISK PREGNANCY IN THIRD TRIMESTER: Status: RESOLVED | Noted: 2019-05-29 | Resolved: 2019-10-22

## 2019-10-22 PROCEDURE — 99024 POSTOP FOLLOW-UP VISIT: CPT | Performed by: OBSTETRICS & GYNECOLOGY

## 2019-10-22 RX ORDER — ACETAMINOPHEN 325 MG/1
650 TABLET ORAL EVERY 4 HOURS PRN
Qty: 30 TABLET | Refills: 0 | Status: SHIPPED | OUTPATIENT
Start: 2019-10-22

## 2019-10-22 RX ORDER — ASPIRIN 81 MG
100 TABLET, DELAYED RELEASE (ENTERIC COATED) ORAL 2 TIMES DAILY PRN
Qty: 10 TABLET | Refills: 0 | Status: SHIPPED | OUTPATIENT
Start: 2019-10-22

## 2019-10-22 RX ADMIN — ACETAMINOPHEN 650 MG: 325 TABLET ORAL at 04:55

## 2019-10-22 RX ADMIN — DOCUSATE SODIUM 100 MG: 100 CAPSULE, LIQUID FILLED ORAL at 08:00

## 2019-10-22 NOTE — PROGRESS NOTES
Progress Note - OB/GYN   Rick Leggett 34 y o  female MRN: [id number]  Unit/Bed#:  323-01 Encounter: [id number]    Assessment:  Post partum Day #2 s/pSVD, stable, baby in nursery    Plan:  1  Post partum   - Continue routine post partum care  - Encourage ambulation  - Encourage breastfeeding    2  Rh neg: RHIG/Rhogam ordered     3  Discharge planning  - Anticipate discharge today  Discussed with patient  Subjective/Objective   Chief Complaint:     Post delivery  Patient is doing well  Lochia WNL  Pain well controlled  Subjective: This morning, she had a headache this morning for which she took Tylenol  Pain: yes, cramping, improved with meds  Tolerating PO: yes  Voiding: yes  Flatus: yes  BM: no  Ambulating: yes  Breastfeeding:  yes  Chest pain: no  Shortness of breath: no  Leg pain: no  Lochia: minimal    Objective:     Vitals: /62   Pulse 85   Temp 97 5 °F (36 4 °C) (Oral)   Resp 18   Ht 5' 5" (1 651 m)   Wt 106 kg (233 lb)   LMP 01/13/2019   SpO2 98%   Breastfeeding? Yes   BMI 38 77 kg/m²     No intake or output data in the 24 hours ending 10/22/19 3455    Lab Results   Component Value Date    WBC 27 37 (H) 10/19/2019    HGB 12 0 10/19/2019    HCT 36 5 10/19/2019    MCV 90 10/19/2019     10/19/2019       Physical Exam:   Physical Exam   Constitutional: She appears well-developed and well-nourished  Cardiovascular: Normal rate, regular rhythm and normal heart sounds  Exam reveals no gallop and no friction rub  No murmur heard  Pulmonary/Chest: Effort normal and breath sounds normal  No stridor  No respiratory distress  She has no wheezes  Abdominal: Soft  She exhibits no distension  There is no tenderness  There is no guarding  Genitourinary:   Genitourinary Comments: Uterus nontender, firm at 1cm below the umbilicus   Skin: Skin is warm and dry  Psychiatric: She has a normal mood and affect  Her behavior is normal    Vitals reviewed      Anya Helms MD  10/22/2019  6:32 AM

## 2019-10-22 NOTE — PLAN OF CARE
Problem: POSTPARTUM  Goal: Experiences normal postpartum course  Description  INTERVENTIONS:  - Monitor maternal vital signs  - Assess uterine involution and lochia  Outcome: Progressing  Goal: Appropriate maternal -  bonding  Description  INTERVENTIONS:  - Identify family support  - Assess for appropriate maternal/infant bonding   -Encourage maternal/infant bonding opportunities  - Referral to  or  as needed  Outcome: Progressing  Goal: Establishment of infant feeding pattern  Description  INTERVENTIONS:  - Assess breast/bottle feeding  - Refer to lactation as needed  Outcome: Progressing  Goal: Incision(s), wounds(s) or drain site(s) healing without S/S of infection  Description  INTERVENTIONS  - Assess and document risk factors for skin impairment   - Assess and document dressing, incision, wound bed, drain sites and surrounding tissue  - Consider nutrition services referral as needed  - Oral mucous membranes remain intact  - Provide patient/ family education  Outcome: Progressing     Problem: PAIN - ADULT  Goal: Verbalizes/displays adequate comfort level or baseline comfort level  Description  Interventions:  - Encourage patient to monitor pain and request assistance  - Assess pain using appropriate pain scale  - Administer analgesics based on type and severity of pain and evaluate response  - Implement non-pharmacological measures as appropriate and evaluate response  - Consider cultural and social influences on pain and pain management  - Notify physician/advanced practitioner if interventions unsuccessful or patient reports new pain  Outcome: Progressing     Problem: INFECTION - ADULT  Goal: Absence or prevention of progression during hospitalization  Description  INTERVENTIONS:  - Assess and monitor for signs and symptoms of infection  - Monitor lab/diagnostic results     Outcome: Progressing  Goal: Absence of fever/infection during neutropenic period  Description  INTERVENTIONS:  - Monitor WBC    Outcome: Progressing     Problem: SAFETY ADULT  Goal: Patient will remain free of falls  Description  INTERVENTIONS:  - Assess patient frequently for physical needs  -  Identify cognitive and physical deficits and behaviors that affect risk of falls    -  [place] fall precautions as indicated by assessment   - Educate patient/family on patient safety including physical limitations  - Instruct patient to call for assistance with activity based on assessment  - Modify environment to reduce risk of injury  - Consider OT/PT consult to assist with strengthening/mobility  Outcome: Progressing  Goal: Maintain or return to baseline ADL function  Description  INTERVENTIONS:  -  Assess patient's ability to carry out ADLs; assess patient's baseline for ADL function and identify physical deficits which impact ability to perform ADLs (bathing, care of mouth/teeth, toileting, grooming, dressing, etc )  - Assess/evaluate cause of self-care deficits   - Assess range of motion  - Assess patient's mobility; develop plan if impaired  - Assess patient's need for assistive devices and provide as appropriate  - Encourage maximum independence but intervene and supervise when necessary  - Involve family in performance of ADLs  - Assess for home care needs following discharge   - Consider OT consult to assist with ADL evaluation and planning for discharge  - Provide patient education as appropriate  Outcome: Progressing  Goal: Maintain or return mobility status to optimal level  Description  INTERVENTIONS:  - Assess patient's baseline mobility status (ambulation, transfers, stairs, etc )    - Identify cognitive and physical deficits and behaviors that affect mobility  - Identify mobility aids required to assist with transfers and/or ambulation (gait belt, sit-to-stand, lift, walker, cane, etc )  - [place] fall precautions as indicated by assessment  - Record patient progress and toleration of activity level on Mobility SBAR; progress patient to next Phase/Stage  - Instruct patient to call for assistance with activity based on assessment  - Consider rehabilitation consult to assist with strengthening/weightbearing, etc   Outcome: Progressing     Problem: Knowledge Deficit  Goal: Patient/family/caregiver demonstrates understanding of disease process, treatment plan, medications, and discharge instructions  Description  Complete learning assessment and assess knowledge base    Interventions:  - Provide teaching at level of understanding  - Provide teaching via preferred learning methods  Outcome: Progressing     Problem: DISCHARGE PLANNING  Goal: Discharge to home or other facility with appropriate resources  Description  INTERVENTIONS:  - Identify barriers to discharge w/patient and caregiver  - Arrange for needed discharge resources and transportation as appropriate  - Identify discharge learning needs (meds, wound care, etc )  - Arrange for interpretive services to assist at discharge as needed  - Refer to Case Management Department for coordinating discharge planning if the patient needs post-hospital services based on physician/advanced practitioner order or complex needs related to functional status, cognitive ability, or social support system  Outcome: Progressing

## 2019-10-22 NOTE — PLAN OF CARE
Problem: POSTPARTUM  Goal: Experiences normal postpartum course  Description  INTERVENTIONS:  - Monitor maternal vital signs  - Assess uterine involution and lochia  Outcome: Adequate for Discharge  Goal: Appropriate maternal -  bonding  Description  INTERVENTIONS:  - Identify family support  - Assess for appropriate maternal/infant bonding   -Encourage maternal/infant bonding opportunities  - Referral to  or  as needed  Outcome: Adequate for Discharge  Goal: Establishment of infant feeding pattern  Description  INTERVENTIONS:  - Assess breast/bottle feeding  - Refer to lactation as needed  Outcome: Adequate for Discharge  Goal: Incision(s), wounds(s) or drain site(s) healing without S/S of infection  Description  INTERVENTIONS  - Assess and document risk factors for skin impairment   - Assess and document dressing, incision, wound bed, drain sites and surrounding tissue  - Consider nutrition services referral as needed  - Oral mucous membranes remain intact  - Provide patient/ family education  Outcome: Adequate for Discharge     Problem: PAIN - ADULT  Goal: Verbalizes/displays adequate comfort level or baseline comfort level  Description  Interventions:  - Encourage patient to monitor pain and request assistance  - Assess pain using appropriate pain scale  - Administer analgesics based on type and severity of pain and evaluate response  - Implement non-pharmacological measures as appropriate and evaluate response  - Consider cultural and social influences on pain and pain management  - Notify physician/advanced practitioner if interventions unsuccessful or patient reports new pain  Outcome: Adequate for Discharge     Problem: INFECTION - ADULT  Goal: Absence or prevention of progression during hospitalization  Description  INTERVENTIONS:  - Assess and monitor for signs and symptoms of infection  - Monitor lab/diagnostic results     Outcome: Adequate for Discharge  Goal: Absence of fever/infection during neutropenic period  Description  INTERVENTIONS:  - Monitor WBC    Outcome: Adequate for Discharge     Problem: SAFETY ADULT  Goal: Patient will remain free of falls  Description  INTERVENTIONS:  - Assess patient frequently for physical needs  -  Identify cognitive and physical deficits and behaviors that affect risk of falls    -  [place] fall precautions as indicated by assessment   - Educate patient/family on patient safety including physical limitations  - Instruct patient to call for assistance with activity based on assessment  - Modify environment to reduce risk of injury  - Consider OT/PT consult to assist with strengthening/mobility  Outcome: Adequate for Discharge  Goal: Maintain or return to baseline ADL function  Description  INTERVENTIONS:  -  Assess patient's ability to carry out ADLs; assess patient's baseline for ADL function and identify physical deficits which impact ability to perform ADLs (bathing, care of mouth/teeth, toileting, grooming, dressing, etc )  - Assess/evaluate cause of self-care deficits   - Assess range of motion  - Assess patient's mobility; develop plan if impaired  - Assess patient's need for assistive devices and provide as appropriate  - Encourage maximum independence but intervene and supervise when necessary  - Involve family in performance of ADLs  - Assess for home care needs following discharge   - Consider OT consult to assist with ADL evaluation and planning for discharge  - Provide patient education as appropriate  Outcome: Adequate for Discharge  Goal: Maintain or return mobility status to optimal level  Description  INTERVENTIONS:  - Assess patient's baseline mobility status (ambulation, transfers, stairs, etc )    - Identify cognitive and physical deficits and behaviors that affect mobility  - Identify mobility aids required to assist with transfers and/or ambulation (gait belt, sit-to-stand, lift, walker, cane, etc )  - [place] fall precautions as indicated by assessment  - Record patient progress and toleration of activity level on Mobility SBAR; progress patient to next Phase/Stage  - Instruct patient to call for assistance with activity based on assessment  - Consider rehabilitation consult to assist with strengthening/weightbearing, etc   Outcome: Adequate for Discharge     Problem: Knowledge Deficit  Goal: Patient/family/caregiver demonstrates understanding of disease process, treatment plan, medications, and discharge instructions  Description  Complete learning assessment and assess knowledge base    Interventions:  - Provide teaching at level of understanding  - Provide teaching via preferred learning methods  Outcome: Adequate for Discharge     Problem: DISCHARGE PLANNING  Goal: Discharge to home or other facility with appropriate resources  Description  INTERVENTIONS:  - Identify barriers to discharge w/patient and caregiver  - Arrange for needed discharge resources and transportation as appropriate  - Identify discharge learning needs (meds, wound care, etc )  - Arrange for interpretive services to assist at discharge as needed  - Refer to Case Management Department for coordinating discharge planning if the patient needs post-hospital services based on physician/advanced practitioner order or complex needs related to functional status, cognitive ability, or social support system  Outcome: Adequate for Discharge

## 2019-10-23 ENCOUNTER — TRANSITIONAL CARE MANAGEMENT (OUTPATIENT)
Dept: FAMILY MEDICINE CLINIC | Facility: CLINIC | Age: 29
End: 2019-10-23

## 2019-10-24 NOTE — UTILIZATION REVIEW
Notification of Maternity/Delivery & [place] Birth Information for Admission  Notification of Maternity/Delivery & [place] Birth Information for Admission to our facility [facility]  Be advised that this patient was admitted to our facility under Inpatient Status  Please contact the Utilization Review Department where the patient is receiving care services for additional admission information  [facility]ility: [facility] ([address] [place] [address])  Place of Service Code: 21   Place of Service Name: [address]  Presentation Date & Time: 10/19/2019  5:14 PM  Inpatient Admission Date & Time: 10/19/19 1912  Discharge Date & Time: 10/22/2019 12:06 PM   Discharge Disposition (if discharged): Home/Self Care  Attending Physician and NPI#: Louise Romano Md [[id number]]  Attending Physician:  DESMOND Dunn  Specialty- Obstetrics and Gynecology  [facility] ID- [id number]  [address] [place] [address]  Phone 1: [phone]  Fax: [phone]  Mother of [place] Information: Jason Jimenez   MRN: [id number] YOB: 1990   Mother's Admitting Diagnosis: 44 weeks gestation of pregnancy [Z3A 39]  Estimated Date of Delivery: 10/20/19  Type of Delivery: Vaginal, Spontaneous    Delivering clinician: Shila   OB History as of 10/22/2019        3    Para   1    Term   1            AB   2    Living   1       SAB        TAB        Ectopic        Multiple   0    Live Births   1           Obstetric Comments   Anemia, Rh negative, bariatric surgery, 2 VIP,cigarette smoker quit with pregancy            Name & MRN:   Information for the patient's :  Verna [facility]) [[id number]]     [place] Delivery Information:  Sex: female  Delivered 10/20/2019 4:22 PM by Vaginal, Spontaneous; Gestational Age: 37w0d     Measurements:  Weight: 9 lb 12 oz (4423 g);   Height: 20"    APGAR 1 minute 5 minutes 10 minutes   Totals: 8 9        [facility] Utilization Review Department  Phone: [phone]; Fax [phone]  [email or web address]  ATTENTION: Please call with any questions or concerns to [phone] and carefully listen to the prompts so that you are directed to the right person  All voicemails are confidential   Sergio Matute all requests for admission clinical reviews, approved or denied determinations and any other requests to dedicated fax number below belonging to the campus where the patient is receiving treatment

## 2019-10-31 LAB — PLACENTA IN STORAGE: NORMAL

## 2019-11-11 ENCOUNTER — POSTPARTUM VISIT (OUTPATIENT)
Dept: OBGYN CLINIC | Facility: CLINIC | Age: 29
End: 2019-11-11
Payer: COMMERCIAL

## 2019-11-11 ENCOUNTER — TELEPHONE (OUTPATIENT)
Dept: OBGYN CLINIC | Facility: CLINIC | Age: 29
End: 2019-11-11

## 2019-11-11 VITALS — BODY MASS INDEX: 32.45 KG/M2 | WEIGHT: 195 LBS | DIASTOLIC BLOOD PRESSURE: 84 MMHG | SYSTOLIC BLOOD PRESSURE: 122 MMHG

## 2019-11-11 PROCEDURE — 99213 OFFICE O/P EST LOW 20 MIN: CPT | Performed by: STUDENT IN AN ORGANIZED HEALTH CARE EDUCATION/TRAINING PROGRAM

## 2019-11-11 RX ORDER — ACETAMINOPHEN AND CODEINE PHOSPHATE 120; 12 MG/5ML; MG/5ML
1 SOLUTION ORAL DAILY
Qty: 84 TABLET | Refills: 1 | Status: SHIPPED | OUTPATIENT
Start: 2019-11-11 | End: 2020-01-14

## 2019-11-11 NOTE — PROGRESS NOTES
Eric Hardin  1990    S:  34 y o  female here for postpartum visit  She is s/p Uncomplicated vaginal delivery on 10/20/2019   No repair  She denies headache, shortness of breath, chest pain, vision changes, right upper quadrant or epigastric tenderness to palpation  Gender: male or female   Apgars: 8/9  Weight: 4423g  Her lochia has resolved  She is Breastfeeding without problems  She denies postpartum blues/depression  Her EPDS is 2  Last Pap: 2019 ASC-H  Colpo with Dr Hoffman Gun impression CIN1, no biopsies taken  Will need postpartum colpo    We discussed contraceptive options in detail including birth control pills, patches, NuvaRing, DepoProvera, Mirena/Kyleena IUD, Nexplanon in detail  At this point she would like IUD with POPs bridge  O:   /84 (BP Location: Left arm, Patient Position: Sitting, Cuff Size: Large)   Wt 88 5 kg (195 lb)   BMI 32 45 kg/m²   She appears well and in no distress  Heart RRR  Lungs CTAB  Abdomen is soft and nontender  External genitals are normal    A/P:  Postpartum visit  She will for her Mirena IUD placement and colposcopy  Advised abstain from intercourse until at least 4 weeks postpartum, at that time should start progesterone only pills and use backup x1 month

## 2019-11-11 NOTE — TELEPHONE ENCOUNTER
Buy and bill Mirena labeled and placed in [facility] room  Pt is scheduled for insertion

## 2019-11-11 NOTE — TELEPHONE ENCOUNTER
----- Message from Ramila Hernandez MD sent at 11/11/2019  2:35 PM EST -----  Regarding: IUD  Luz Marina Hebertde is interested in a Mirena, possibly a [place]  Would she be covered for this?   Thank you! -AMM

## 2019-11-11 NOTE — TELEPHONE ENCOUNTER
Patient coming in for Mirena IUD at 8 weeks  She is post partum and has [place]  Please label and she comes to [facility]

## 2019-11-11 NOTE — TELEPHONE ENCOUNTER
Called patient to schedule colposcopy due to her previous pap being ASC-H  She needs on 6 weeks from her delivery date which will be in 3 weeks  Told patient to take Tylenol on message  She is going to get a Mirena IUD at 8 weeks and task in to [facility] to label  You can schedule this as well  She will be on the pill

## 2019-11-12 ENCOUNTER — TELEPHONE (OUTPATIENT)
Dept: OBGYN CLINIC | Facility: CLINIC | Age: 29
End: 2019-11-12

## 2019-11-12 RX ORDER — PNV NO.95/FERROUS FUM/FOLIC AC 28MG-0.8MG
1 TABLET ORAL DAILY
Qty: 30 TABLET | Refills: 11 | Status: SHIPPED | OUTPATIENT
Start: 2019-11-12 | End: 2020-01-14

## 2019-11-12 NOTE — TELEPHONE ENCOUNTER
Pt is wondering why her iud appointment is in January - she had thought she could get it sooner  Also returning your call about colposcopy  Please clarify

## 2019-11-12 NOTE — TELEPHONE ENCOUNTER
Spoke with patient in detail about her pap results  Since pap is abnormal can't exclude HSIL she needs a repeat colposcopy  Appointment scheduled with Dr Kaley Valdovinos on 12/3  She will medicate and eat prior to visit  Discussed IUD at 8 weeks since she needs a colposcopy first  Patient agrees with plan  Taking Micronor and will start asap until she has IUD inserted

## 2019-11-13 ENCOUNTER — TELEPHONE (OUTPATIENT)
Dept: OBGYN CLINIC | Facility: CLINIC | Age: 29
End: 2019-11-13

## 2019-11-13 NOTE — TELEPHONE ENCOUNTER
Patient saw Dr Gonzalo Santos on 11/11/2019 and was told to start taking a progesterone only pill when she is 4 weeks post partum  Patient needs a script for the pill sent to her pharmacy

## 2019-11-15 NOTE — TELEPHONE ENCOUNTER
Pt states [facility] told her they only got the script for the PN vitamin but nothing for the [facility] pill

## 2019-12-03 ENCOUNTER — PROCEDURE VISIT (OUTPATIENT)
Dept: OBGYN CLINIC | Facility: CLINIC | Age: 29
End: 2019-12-03
Payer: COMMERCIAL

## 2019-12-03 VITALS — DIASTOLIC BLOOD PRESSURE: 80 MMHG | BODY MASS INDEX: 31.09 KG/M2 | SYSTOLIC BLOOD PRESSURE: 116 MMHG | WEIGHT: 186.8 LBS

## 2019-12-03 DIAGNOSIS — Z30.430 ENCOUNTER FOR INSERTION OF MIRENA IUD: Primary | ICD-10-CM

## 2019-12-03 DIAGNOSIS — R87.611 ATYPICAL SQUAMOUS CELLS CANNOT EXCLUDE HIGH GRADE SQUAMOUS INTRAEPITHELIAL LESION ON CYTOLOGIC SMEAR OF CERVIX (ASC-H): ICD-10-CM

## 2019-12-03 DIAGNOSIS — Z30.430 ENCOUNTER FOR INSERTION OF INTRAUTERINE CONTRACEPTIVE DEVICE (IUD): ICD-10-CM

## 2019-12-03 LAB — SL AMB POCT URINE HCG: NORMAL

## 2019-12-03 PROCEDURE — 88344 IMHCHEM/IMCYTCHM EA MLT ANTB: CPT | Performed by: PATHOLOGY

## 2019-12-03 PROCEDURE — 88305 TISSUE EXAM BY PATHOLOGIST: CPT | Performed by: PATHOLOGY

## 2019-12-03 PROCEDURE — 81025 URINE PREGNANCY TEST: CPT | Performed by: STUDENT IN AN ORGANIZED HEALTH CARE EDUCATION/TRAINING PROGRAM

## 2019-12-03 PROCEDURE — 99215 OFFICE O/P EST HI 40 MIN: CPT | Performed by: STUDENT IN AN ORGANIZED HEALTH CARE EDUCATION/TRAINING PROGRAM

## 2019-12-03 PROCEDURE — 58300 INSERT INTRAUTERINE DEVICE: CPT | Performed by: STUDENT IN AN ORGANIZED HEALTH CARE EDUCATION/TRAINING PROGRAM

## 2019-12-03 PROCEDURE — 57454 BX/CURETT OF CERVIX W/SCOPE: CPT | Performed by: STUDENT IN AN ORGANIZED HEALTH CARE EDUCATION/TRAINING PROGRAM

## 2019-12-03 NOTE — PROGRESS NOTES
Problem visit established - Gynecology   Deepthi Wtaers 34 y o  female MRN: [id number]  227 M  [facility]  Encounter: [id number]    Chief Complaint   Patient presents with   [facility]es Procedure     colposcopy  Pap on 3/13/19 ASCUS, cannot exclude HSIL  History of Present Illness     Deepthi Waters is a 34 y o  female [id number] who presents for Mirena IUD insertion  Concerns today: none    REVIEW OF SYSTEMS  12 point review of systems negative aside from HPI      Past Medical History:   Diagnosis Date    Abnormal Pap smear of cervix     ASCUS , ASCUS cannot exclude HGIL     Anemia     Depression     hx of depression     GERD (gastroesophageal reflux disease)     resolved per pt     History of removal of ovarian cyst     Increased BMI     Shingles     with pregnancy    Subcutaneous cyst     Varicella     had chicken pox as child/shingles with pregnancy     Patient Active Problem List   Diagnosis    History of abnormal cervical Papanicolaou smear    Pap smear of cervix with ASCUS, cannot exclude HGSIL    Anemia during pregnancy in third trimester    History of bariatric surgery    Anemia    39 weeks gestation of pregnancy    Rh negative state in antepartum period    Subcutaneous cyst    GBS (group B Streptococcus carrier), +RV culture, currently pregnant     (spontaneous vaginal delivery)       Past Surgical History:   Procedure Laterality Date    APPENDECTOMY      BARIATRIC SURGERY      GASTRIC BYPASS          OVARIAN CYST REMOVAL             OB History        3    Para   1    Term   1            AB   2    Living   1       SAB        TAB        Ectopic        Multiple   0    Live Births   1           Obstetric Comments   Anemia, Rh negative, bariatric surgery, 2 VIP,cigarette smoker quit with pregancy                 Social History   Social History     Substance and Sexual Activity   Alcohol Use Not Currently Social History     Substance and Sexual Activity   Drug Use No     Social History     Tobacco Use   Smoking Status Former Smoker    Packs/day: 1 00    Years: 9 00    Pack years: 9 00    Last attempt to quit: 3/19/2019    Years since quittin 7   Smokeless Tobacco Never Used   Tobacco Comment    quit with pregnancy, plans on staying smoke free         Current Outpatient Medications:     acetaminophen (TYLENOL) 325 mg tablet, Take 2 tablets (650 mg total) by mouth every 4 (four) hours as needed for mild pain or headaches, Disp: 30 tablet, Rfl: 0    [facility]S D3 1000 units capsule, Take 2,000 Units by mouth daily, Disp: , Rfl: 12    Docusate Sodium (DOK) 100 MG capsule, Take 1 tablet (100 mg total) by mouth 2 (two) times a day as needed for constipation, Disp: 10 tablet, Rfl: 0    ferrous gluconate (FERGON) 324 mg tablet, Take 324 mg by mouth daily with breakfast, Disp: , Rfl:     norethindrone (MICRONOR) 0 35 MG tablet, Take 1 tablet (0 35 mg total) by mouth daily, Disp: 84 tablet, Rfl: 1    Prenatal Vit-Fe Fumarate-FA (PRENATAL VITAMIN) 27-0 8 MG TABS, Take 1 tablet by mouth daily, Disp: 30 tablet, Rfl: 11    Current Facility-Administered Medications:     levonorgestrel (MIRENA) IUD 20 mcg/day, 1 each, Intrauterine, Once, Orlando Curtis MD    Allergies   Allergen Reactions    Motrin [Ibuprofen]      Hx gastric bypass       Objective   Vitals: Blood pressure 116/80, weight 84 7 kg (186 lb 12 8 oz), currently breastfeeding  Body mass index is 31 09 kg/m²  General: NAD, AAOx3  Heart: non-tachycardic  Lungs: non-labored breathing, symmetric chest rise    Prior to insertion informed consent was obtained and a surgical timeout was performed  Additionally, a pregnancy test was noted to be negative  Insertion of a speculum allowed for visualization of the cervix  A betadine solution was used x3 to clean the cervix, and a single tooth tenaculum was placed on the anterior lip   Sounding the uterus revealed a depth of 8 cm  The IUD was removed from its packaging and loaded  The flange on the insertion tube was set to correspond to uterine depth  Using the tenaculum for stabilization the insertion tube was placed in the cervix  Dilation was not performed with os finder  The insertion tube slid into the cervix until the flange was approximately 1 5 cm from the external os  The arms of the IUD were then released and this position was held for 10 seconds to allow for full deployment  Following full arm deployment the  was advanced until the flange was flush with the external os and the IUD was then released  Upon release the  was withdrawn  Strings were trimmed to approximately 4 cm in length and the tenaculum was then removed  Hemostasis of the tenaculum sites was obtained with pressure  Prior to removal of the speculum the tenaculum sites were noted to be hemostatic  The patient tolerated the procedure well  Lot number: [id number]  Expiration: 3/2022      Lab Results:   No visits with results within 1 Day(s) from this visit     Latest known visit with results is:   Admission on 10/19/2019, Discharged on 10/22/2019   Component Date Value    ABO Grouping 10/19/2019 O     Rh Factor 10/19/2019 Negative     Antibody Screen 10/19/2019 Negative     Specimen Expiration Date 10/19/2019 [id number]     WBC 10/19/2019 27 37*    RBC 10/19/2019 4 05     Hemoglobin 10/19/2019 12 0     Hematocrit 10/19/2019 36 5     MCV 10/19/2019 90     MCH 10/19/2019 29 6     MCHC 10/19/2019 32 9     RDW 10/19/2019 13 6     MPV 10/19/2019 10 0     Platelets 06/08/9197 202     nRBC 10/19/2019 0     Neutrophils Relative 10/19/2019 86*    Immat GRANS % 10/19/2019 1     Lymphocytes Relative 10/19/2019 6*    Monocytes Relative 10/19/2019 7     Eosinophils Relative 10/19/2019 0     Basophils Relative 10/19/2019 0     Neutrophils Absolute 10/19/2019 23 55*    Immature Grans Absolute 10/19/2019 0 23*    Lymphocytes Absolute 10/19/2019 1 66     Monocytes Absolute 10/19/2019 1 86*    Eosinophils Absolute 10/19/2019 0 01     Basophils Absolute 10/19/2019 0 06     RPR 10/19/2019 Non-Reactive     pH, Cord Art 10/20/2019 7 193*    pCO2, Cord Art 10/20/2019 52 5     pO2, Cord Art 10/20/2019 23 1     HCO3, Cord Art 10/20/2019 19 7     Base Exc, Cord Art 10/20/2019 -8 9*    O2 Content, Cord Art 10/20/2019 11 2     O2 Hgb, Arterial Cord 10/20/2019 46 2     pH, Cord Tae 10/20/2019 7  341     pCO2, Cord Tae 10/20/2019 38 6     pO2, Cord Tae 10/20/2019 23 1     HCO3, Cord Tae 10/20/2019 20 4    [facility] Exc, Cord Tae 10/20/2019 -4 8*    O2 Cont, Cord Tae 10/20/2019 13 8     O2 HGB,VENOUS CORD 10/20/2019 57 2     PLACENTA IN STORAGE 10/20/2019 Placenta Discarded     ABO Grouping 10/21/2019 O     Rh Factor 10/21/2019 Negative     Antibody Screen 10/21/2019 Negative     Fetal Bleed Screen 10/21/2019 Negative         Assessment/Plan     Assessment:  34 y o  [id number] with desire for Mirena IUD for contraception, s/p placement after colposcopy  Diagnoses and all orders for this visit:    Encounter for insertion of mirena IUD  -     levonorgestrel (MIRENA) IUD 20 mcg/day    Atypical squamous cells cannot exclude high grade squamous intraepithelial lesion on cytologic smear of cervix (ASC-H)  -     Cancel: Tissue Exam  -     Cancel: Tissue Exam  -     Tissue Exam    Encounter for insertion of intrauterine contraceptive device (IUD)    Reviewed the expected efficacy of Mirena IUD, 5 years  Pregnancy, although unlikely, while using Mirena may be more likely to be an ectopic pregnancy  Expect irregular bleeding for the first 3-6 months after placement  Some women become amenorrheic after time  Sometimes ovarian cysts occur  Return for annual or PRN, for string check

## 2019-12-03 NOTE — PROGRESS NOTES
Problem visit established - Gynecology   Ángel Hancock 34 y o  female MRN: [id number]  227 M  [facility]  Encounter: [id number]    Chief Complaint   Patient presents with   Paredes Procedure     colposcopy  Pap on 3/13/19 ASCUS, cannot exclude HSIL  History of Present Illness     Ángel Hancock is a 34 y o  female [id number] who presents for scheduled colposcopy  Concerns today: none    REVIEW OF SYSTEMS  12 point review of systems negative aside from HPI      Past Medical History:   Diagnosis Date    Abnormal Pap smear of cervix     ASCUS , ASCUS cannot exclude HGIL     Anemia     Depression     hx of depression     GERD (gastroesophageal reflux disease)     resolved per pt     History of removal of ovarian cyst     Increased BMI     Shingles     with pregnancy    Subcutaneous cyst     Varicella     had chicken pox as child/shingles with pregnancy     Patient Active Problem List   Diagnosis    History of abnormal cervical Papanicolaou smear    Pap smear of cervix with ASCUS, cannot exclude HGSIL    Anemia during pregnancy in third trimester    History of bariatric surgery    Anemia    39 weeks gestation of pregnancy    Rh negative state in antepartum period    Subcutaneous cyst    GBS (group B Streptococcus carrier), +RV culture, currently pregnant     (spontaneous vaginal delivery)       Past Surgical History:   Procedure Laterality Date    APPENDECTOMY      BARIATRIC SURGERY      GASTRIC BYPASS          OVARIAN CYST REMOVAL             OB History        3    Para   1    Term   1            AB   2    Living   1       SAB        TAB        Ectopic        Multiple   0    Live Births   1           Obstetric Comments   Anemia, Rh negative, bariatric surgery, 2 VIP,cigarette smoker quit with pregancy                 Social History   Social History     Substance and Sexual Activity   Alcohol Use Not Currently Social History     Substance and Sexual Activity   Drug Use No     Social History     Tobacco Use   Smoking Status Former Smoker    Packs/day: 1 00    Years: 9 00    Pack years: 9 00    Last attempt to quit: 3/19/2019    Years since quittin 7   Smokeless Tobacco Never Used   Tobacco Comment    quit with pregnancy, plans on staying smoke free         Current Outpatient Medications:     acetaminophen (TYLENOL) 325 mg tablet, Take 2 tablets (650 mg total) by mouth every 4 (four) hours as needed for mild pain or headaches, Disp: 30 tablet, Rfl: 0    CVS D3 1000 units capsule, Take 2,000 Units by mouth daily, Disp: , Rfl: 12    Docusate Sodium (DOK) 100 MG capsule, Take 1 tablet (100 mg total) by mouth 2 (two) times a day as needed for constipation, Disp: 10 tablet, Rfl: 0    ferrous gluconate (FERGON) 324 mg tablet, Take 324 mg by mouth daily with breakfast, Disp: , Rfl:     norethindrone (MICRONOR) 0 35 MG tablet, Take 1 tablet (0 35 mg total) by mouth daily, Disp: 84 tablet, Rfl: 1    Prenatal Vit-Fe Fumarate-FA (PRENATAL VITAMIN) 27-0 8 MG TABS, Take 1 tablet by mouth daily, Disp: 30 tablet, Rfl: 11    Current Facility-Administered Medications:     levonorgestrel (MIRENA) IUD 20 mcg/day, 1 each, Intrauterine, Once, Jessica Pradhan MD    Allergies   Allergen Reactions    Motrin [Ibuprofen]      Hx gastric bypass       Objective   Vitals: Blood pressure 116/80, weight 84 7 kg (186 lb 12 8 oz), currently breastfeeding  Body mass index is 31 09 kg/m²  General: NAD, AAOx3  Heart: non-tachycardic  Lungs: non-labored breathing, symmetric chest rise    Prior to procedure, consent was reviewed and signed, timeout was performed, and pregnancy test was noted to be negative    She was placed in dorsal lithotomy position with her legs supported by stirrups  Pelvic examination reveals normal female external genitalia  Vagina is normal in appearance without lesion    Cervix was visualized and painted with dilute acetic acid solution using cotton swabs  The entire face of the cervix and the entire transformation zone were able to be visualized with colposcope  There were areas of acetowhite at the 1:00 position  There was additionally evidence of increased vasularity at the 6:00 position  These areas were biopsied and passed of for pathology  Endocervical curettage performed  Hemostasis of sites obtained with pressure  Colposcopic impression today is that of MAT 1  Lab Results:   No visits with results within 1 Day(s) from this visit  Latest known visit with results is:   Admission on 10/19/2019, Discharged on 10/22/2019   Component Date Value    ABO Grouping 10/19/2019 O     Rh Factor 10/19/2019 Negative     Antibody Screen 10/19/2019 Negative     Specimen Expiration Date 10/19/2019 [id number]     WBC 10/19/2019 27 37*    RBC 10/19/2019 4 05     Hemoglobin 10/19/2019 12 0     Hematocrit 10/19/2019 36 5     MCV 10/19/2019 90     MCH 10/19/2019 29 6     MCHC 10/19/2019 32 9     RDW 10/19/2019 13 6     MPV 10/19/2019 10 0     Platelets 30/59/5911 202     nRBC 10/19/2019 0     Neutrophils Relative 10/19/2019 86*    Immat GRANS % 10/19/2019 1     Lymphocytes Relative 10/19/2019 6*    Monocytes Relative 10/19/2019 7     Eosinophils Relative 10/19/2019 0     Basophils Relative 10/19/2019 0     Neutrophils Absolute 10/19/2019 23 55*    Immature Grans Absolute 10/19/2019 0 23*    Lymphocytes Absolute 10/19/2019 1 66     Monocytes Absolute 10/19/2019 1 86*    Eosinophils Absolute 10/19/2019 0 01     Basophils Absolute 10/19/2019 0 06     RPR 10/19/2019 Non-Reactive     pH, Cord Art 10/20/2019 7 193*    pCO2, Cord Art 10/20/2019 52 5     pO2, Cord Art 10/20/2019 23 1     HCO3, Cord Art 10/20/2019 19 7     Base Exc, Cord Art 10/20/2019 -8 9*    O2 Content, Cord Art 10/20/2019 11 2     O2 Hgb, Arterial Cord 10/20/2019 46 2     pH, Cord Tae 10/20/2019 7  [address], [facility] 10/20/2019 38 6     pO2, Cord Tae 10/20/2019 23 1     HCO3, Cord Tae 10/20/2019 20 4    [facility] Exc, Cord Tae 10/20/2019 -4 8*    O2 Cont, Cord Tae 10/20/2019 13 8     O2 HGB,VENOUS CORD 10/20/2019 57 2     PLACENTA IN STORAGE 10/20/2019 Placenta Discarded     ABO Grouping 10/21/2019 O     Rh Factor 10/21/2019 Negative     Antibody Screen 10/21/2019 Negative     Fetal Bleed Screen 10/21/2019 Negative         Assessment/Plan     Assessment:  34 y o  [id number] with ASC-H s/p colposcopy  Diagnoses and all orders for this visit:    Encounter for insertion of mirena IUD  -     levonorgestrel (MIRENA) IUD 20 mcg/day    Atypical squamous cells cannot exclude high grade squamous intraepithelial lesion on cytologic smear of cervix (ASC-H)  -     Cancel: Tissue Exam  -     Cancel: Tissue Exam  -     Tissue Exam  Will call with results of colposcopy  Post-care instructions reviewed, sheet given  Nothing in vagina for 14 days  Briefly reviewed LEEP procedure  Encounter for insertion of intrauterine contraceptive device (IUD)      Return for annual or PRN

## 2019-12-12 ENCOUNTER — TELEPHONE (OUTPATIENT)
Dept: OBGYN CLINIC | Facility: CLINIC | Age: 29
End: 2019-12-12

## 2019-12-12 NOTE — RESULT ENCOUNTER NOTE
Attempted to call Nghia Fischer regarding her results - sent message in [employer] recommending LEEP procedure  Please reach out and schedule for outpatient LEEP on procedure day or for office visit preop if she wants this done in the OR    Thank you! -AMM

## 2019-12-12 NOTE — TELEPHONE ENCOUNTER
Spoke to Linda Cordero and reviewed Dr Caprice Morgan recommendations  Reviewed her test results and a LEEP procedure  Pt would like to proceed with office procedure over a OR procedure if possible with her insurance  She also has questions about whether or not she would need her Mirena taken out for the procure an then replaced after? I told her I thoiught she would but would need to confirmm that with DR MELENDEZ  I advised pt that to her insurance question and IUD question I was unsure but would get her a answer and call her back sometime tomorrow  She was comfortable with that plan of care

## 2019-12-12 NOTE — TELEPHONE ENCOUNTER
----- Message from Alberto Gabriel MD sent at 12/12/2019  1:57 PM EST -----  Attempted to call Eden Conn regarding her results - sent message in [employer] recommending LEEP procedure  Please reach out and schedule for outpatient LEEP on procedure day or for office visit preop if she wants this done in the OR    Thank you! -AMM

## 2019-12-12 NOTE — TELEPHONE ENCOUNTER
Shweta! Thank you for answering Shayla's questions for me - had tried to reach her earlier without success  I think an office procedure would be a great fit for her  Generally we can keep the IUD in and try to avoid cutting strings at time of the LEEP  If the strings get cut (which they likely will) that doesn't affect efficacy of the birth control but it might make it more difficult to remove in the future   -AMM

## 2019-12-13 ENCOUNTER — TELEPHONE (OUTPATIENT)
Dept: OBGYN CLINIC | Age: 29
End: 2019-12-13

## 2019-12-13 NOTE — TELEPHONE ENCOUNTER
Second attempt to call Christian Kaur to discuss LEEP, no answer  If she calls back, please let her know that I am okay doing LEEP and keeping Mirena in even if strings get cut - this will not affect efficacy    Thanks! -AMM

## 2019-12-13 NOTE — TELEPHONE ENCOUNTER
Left a message for Seema Ford reviewing Dr Indigo Vargas notes about the Mirena  Also advised pt that the surgery schedules with get the authorization for a office procedure, normally not a problem to have in office but they would be in touch with her once they had approval to get everything set up

## 2019-12-16 NOTE — TELEPHONE ENCOUNTER
Pt contacted and advised as directed  Pt stated she has an appt today  I confirmed and advised yes arrival 2:05 appt for 2:20 to start  Pt stated she will see if she can get a ride due to thought her appt was for 3  Pt will get to appt if not will call bk to reschedule

## 2020-01-06 ENCOUNTER — TELEPHONE (OUTPATIENT)
Dept: OBGYN CLINIC | Facility: CLINIC | Age: 30
End: 2020-01-06

## 2020-01-06 NOTE — TELEPHONE ENCOUNTER
She's seeing me tomorrow at her appointment so no problems! Will discuss at that appointment    Thanks! -AMM

## 2020-01-06 NOTE — TELEPHONE ENCOUNTER
Not sure what call she is returning? Please advise if I need to call patient to inform anything  Thanks!

## 2020-01-13 PROBLEM — D06.9 CIN III (CERVICAL INTRAEPITHELIAL NEOPLASIA III): Status: ACTIVE | Noted: 2020-01-13

## 2020-01-14 ENCOUNTER — OFFICE VISIT (OUTPATIENT)
Dept: OBGYN CLINIC | Facility: CLINIC | Age: 30
End: 2020-01-14
Payer: COMMERCIAL

## 2020-01-14 VITALS — SYSTOLIC BLOOD PRESSURE: 106 MMHG | DIASTOLIC BLOOD PRESSURE: 84 MMHG | WEIGHT: 184.2 LBS | BODY MASS INDEX: 30.65 KG/M2

## 2020-01-14 DIAGNOSIS — D06.9 CARCINOMA IN SITU OF CERVIX, UNSPECIFIED LOCATION: Primary | ICD-10-CM

## 2020-01-14 PROCEDURE — 99213 OFFICE O/P EST LOW 20 MIN: CPT | Performed by: STUDENT IN AN ORGANIZED HEALTH CARE EDUCATION/TRAINING PROGRAM

## 2020-01-14 NOTE — PROGRESS NOTES
Preop established - Gynecology   Nasario Apley 34 y o  female MRN: [id number]  227 M  [facility]  Encounter: [id number]    Chief Complaint   Patient presents with    Follow-up     pre-op consent for LEEP  History of Present Illness     Nasario Apley is a 34 y o  female [id number] who presents as a preop for LEEP procedure  Cervical cancer screenin2016 ASCUS  3/13/2019 ASC-H - in pregnancy, colposcopy, no biopsies  12/3/2019 colposcopy 1:00 and 6:00 MAT II  ECC MAT III      REVIEW OF SYSTEMS  12 point review of systems negative aside from HPI      Past Medical History:   Diagnosis Date    Abnormal Pap smear of cervix     ASCUS , ASCUS cannot exclude HGIL     Anemia     Depression     hx of depression     GERD (gastroesophageal reflux disease)     resolved per pt     History of removal of ovarian cyst     Increased BMI     Shingles     with pregnancy    Subcutaneous cyst     Varicella     had chicken pox as child/shingles with pregnancy     Patient Active Problem List   Diagnosis    History of abnormal cervical Papanicolaou smear    Pap smear of cervix with ASCUS, cannot exclude HGSIL    Anemia during pregnancy in third trimester    History of bariatric surgery    Anemia    39 weeks gestation of pregnancy    Rh negative state in antepartum period    Subcutaneous cyst    GBS (group B Streptococcus carrier), +RV culture, currently pregnant     (spontaneous vaginal delivery)    MAT III (cervical intraepithelial neoplasia III)       Past Surgical History:   Procedure Laterality Date    APPENDECTOMY      BARIATRIC SURGERY      GASTRIC BYPASS          OVARIAN CYST REMOVAL             OB History        3    Para   1    Term   1            AB   2    Living   1       SAB        TAB        Ectopic        Multiple   0    Live Births   1           Obstetric Comments   Anemia, Rh negative, bariatric surgery, 2 VIP,cigarette smoker quit with pregancy                 Social History   Social History     Substance and Sexual Activity   Alcohol Use Yes    Comment: occ     Social History     Substance and Sexual Activity   Drug Use No     Social History     Tobacco Use   Smoking Status Current Some Day Smoker    Packs/day: 0 00    Years: 9 00    Pack years: 0 00    Types: Cigarettes    Last attempt to quit: 3/19/2019    Years since quittin 8   Smokeless Tobacco Never Used   Tobacco Comment    started to smoke again here and there          Current Outpatient Medications:     acetaminophen (TYLENOL) 325 mg tablet, Take 2 tablets (650 mg total) by mouth every 4 (four) hours as needed for mild pain or headaches, Disp: 30 tablet, Rfl: 0    CVS D3 1000 units capsule, Take 2,000 Units by mouth daily, Disp: , Rfl: 12    Docusate Sodium (DOK) 100 MG capsule, Take 1 tablet (100 mg total) by mouth 2 (two) times a day as needed for constipation, Disp: 10 tablet, Rfl: 0    ferrous gluconate (FERGON) 324 mg tablet, Take 324 mg by mouth daily with breakfast, Disp: , Rfl:     Allergies   Allergen Reactions    Motrin [Ibuprofen]      Hx gastric bypass       Objective   Vitals: Blood pressure 106/84, weight 83 6 kg (184 lb 3 2 oz), currently breastfeeding  Body mass index is 30 65 kg/m²  General:  NAD   HEENT:  moist mucous membranes  Heart:  RRR  Chest:  CTAB  Abdomen:  soft, nondistended, nontender to palpation  Extremity:  calves nontender to palpation  Neuro:  grossly normal    Lab Results:   No visits with results within 1 Day(s) from this visit     Latest known visit with results is:   Procedure visit on 2019   Component Date Value    Case Report 2019                      Value:Surgical Pathology Report                         Case: [id number]                                   Authorizing Provider:  Joseph Moreno MD          Collected:           2019 1512              Ordering Location:     [facility]      Received:            12/03/2019 1512                                     [facility]                                                                               [address]                                                                    Pathologist:           Michele Price MD                                                              Specimens:   A) - Endocervical, ECC                                                                              B) - Cervix, 1 o'clock                                                                              C) - Cervix, 6 o'clock                                                                     Final Diagnosis 12/03/2019                      Value: This result contains rich text formatting which cannot be displayed here   Note 12/03/2019                      Value: This result contains rich text formatting which cannot be displayed here   Additional Information 12/03/2019                      Value: This result contains rich text formatting which cannot be displayed here  Marek Hummel Gross Description 12/03/2019                      Value: This result contains rich text formatting which cannot be displayed here   URINE HCG 12/03/2019 neg         Assessment/Plan     Assessment:  34 y o  [id number] with MAT III, need for LEEP  Diagnoses and all orders for this visit:    Carcinoma in situ of cervix, unspecified location    Reviewed concern to rule out cervical cancer, as well las potential future procedures  If diagnosed with cervical cancer, will refer to GYN ONC  Regarding LEEP procedure, discussed risk of shortened cervix, possibility of complication in future pregnancy, risk loss from cervical incompetence  However weighing risk and benefit, would strongly recommend proceeding with LEEP at this time      Will attempt to avoid cutting Mirena IUD strings - however understands this may not be able to be avoided, implications of future removal of Mirena, possible need for hysteroscopy to remove  Does desire future pregnancy, possibly in ~4 years  Otherwise doing well with her little girl, no concerns at this time  Will plan for outpatient surgery (BMI 30 6)

## 2020-01-27 ENCOUNTER — PROCEDURE VISIT (OUTPATIENT)
Dept: OBGYN CLINIC | Facility: CLINIC | Age: 30
End: 2020-01-27
Payer: COMMERCIAL

## 2020-01-27 DIAGNOSIS — D06.9 SEVERE DYSPLASIA OF CERVIX (CIN III): Primary | ICD-10-CM

## 2020-01-27 DIAGNOSIS — N91.2 AMENORRHEA: ICD-10-CM

## 2020-01-27 LAB — SL AMB POCT URINE HCG: NEGATIVE

## 2020-01-27 PROCEDURE — 88307 TISSUE EXAM BY PATHOLOGIST: CPT | Performed by: PATHOLOGY

## 2020-01-27 PROCEDURE — 57522 CONIZATION OF CERVIX: CPT | Performed by: STUDENT IN AN ORGANIZED HEALTH CARE EDUCATION/TRAINING PROGRAM

## 2020-01-27 PROCEDURE — 81025 URINE PREGNANCY TEST: CPT | Performed by: STUDENT IN AN ORGANIZED HEALTH CARE EDUCATION/TRAINING PROGRAM

## 2020-01-27 NOTE — PROGRESS NOTES
Colposcopy  Date/Time: 1/27/2020 3:41 PM  Performed by: Gilda Angela MD  Authorized by: Gilda Angela MD     Consent:     Consent obtained:  Verbal and written    Consent given by:  Patient    Procedural risks discussed:  Bleeding, damage to other organs, infection, repeat procedure and possible loss of function    Patient questions answered: yes      Patient agrees, verbalizes understanding, and wants to proceed: yes      Educational handouts given: yes      Instructions and paperwork completed: yes    Pre-procedure:     Pre-procedure timeout performed: yes      Premeds:  Acetaminophen    Prepped with: Lugol      Local anesthetic:  Lidocaine 1% w/o epi and lidocaine WITH epinephrine (paracervical block plain (10 mL), face of cervix with epi (5 mL))  Indication:     Indications: MAT II, MAT III  Procedure:     Procedure: LEEP cone biopsy only      LEEP:  Ball 5 mm and Radius 2 x 1 cm    The grounding pad was placed: yes      Ball cautery with coag current: yes      LEEP details:  A coated speculum was inserted into the vagina and used to visualize the cervix  Tenaculum placed on anterior lip  Paracervical block performed  Lidocaine with epinephrine was injected circumferentially on the face of the cervix and blanching was noted  The transformation zone was identified  Lugol's solution was applied to the cervix and a lesion was identified at the 12:00 and 6:00 positions  A loop electrode (15mm x 10mm) was selected and used to excise the posterior lesion using cut setting  Cervical specimen was tagged at the 6'oclock position and sent for pathology  Repeat performed to excise anterior lesion using cut setting, specimen tagged at 12:00 position  Mirena IUD strings manipulated and preserved  The cervical bed was cauterized using a ball tip Bovie electrocautery, taking care to avoid the cervical canal  Monsel's solution was applied  Good hemostasis was confirmed at the conclusion of the procedure       Single toothed tenaculum was removed from the anterior lip of the cervix  Good hemostasis was confirmed at the tenaculum puncture sites  Coated speculum was removed from vagina  Under satisfactory analgesia the patient was prepped and draped in the dorsal lithotomy position: yes      Butte speculum was placed in the vagina: yes      Intracervical block was performed: yes      Monsel's solution was applied: yes      Biopsy(s): yes      Location:  Anterior lip, posterior lip or cervix, tagged at 12:00 and 6:00 respectively    Specimen to pathology: yes    Post-procedure:     Findings: White epithelium      Impression: Low grade cervical dysplasia      Patient tolerance of procedure: Tolerated well, no immediate complications  Comments:      Propofol administered for conscious sedation by Anesthesia, see MAR for details

## 2020-02-04 ENCOUNTER — TELEPHONE (OUTPATIENT)
Dept: OBGYN CLINIC | Facility: CLINIC | Age: 30
End: 2020-02-04

## 2020-02-04 NOTE — TELEPHONE ENCOUNTER
No additional surgery indicated at this time, will need repeat cytology (Pap smear and endocervical curettage) in 4 months  Please ensure she makes this appointment  Will discuss in more detail at follow up visit    Thank you! -AMM

## 2020-02-04 NOTE — TELEPHONE ENCOUNTER
Spoke to Naga and advised her that her results have not been reviewed/release by provider and that I would route her message to Dr MELENDEZ for follow up

## 2020-02-10 NOTE — TELEPHONE ENCOUNTER
Pt contacted and advised as directed  Pt scheduled for 05/13 at 10 am in [facility] with dr Humberto Del Rosario   Pt was grateful for the call,

## 2020-02-10 NOTE — TELEPHONE ENCOUNTER
See previous message: "No additional surgery indicated at this time, will need repeat cytology (Pap smear and endocervical curettage) in 4 months  Please ensure she makes this appointment  Will discuss in more detail at follow up visit  Thank you! -AMM"    No signs of cancer    Thank you! -AMM

## 2020-02-17 ENCOUNTER — OFFICE VISIT (OUTPATIENT)
Dept: OBGYN CLINIC | Facility: CLINIC | Age: 30
End: 2020-02-17

## 2020-02-17 VITALS — WEIGHT: 181.8 LBS | BODY MASS INDEX: 30.25 KG/M2 | SYSTOLIC BLOOD PRESSURE: 124 MMHG | DIASTOLIC BLOOD PRESSURE: 72 MMHG

## 2020-02-17 DIAGNOSIS — Z98.890 S/P LEEP OF CERVIX: Primary | ICD-10-CM

## 2020-02-17 PROCEDURE — 99024 POSTOP FOLLOW-UP VISIT: CPT | Performed by: STUDENT IN AN ORGANIZED HEALTH CARE EDUCATION/TRAINING PROGRAM

## 2020-02-17 NOTE — PROGRESS NOTES
Problem visit established - Gynecology   Nivia Murry 34 y o  female MRN: [id number]  227 M  [facility]  Encounter: [id number]    Chief Complaint   Patient presents with    Post-op     post leep        History of Present Illness     Nivia Murry is a 34 y o  female [id number] who presents for postop appointmetn from LEEP performed 2020  Some spotting, otherwise discharge resolved  No pain, doing well  Daughter Arnoldo Veliz with some difficulty feeding and teething, reports tired today  REVIEW OF SYSTEMS  12 point review of systems negative aside from HPI      Past Medical History:   Diagnosis Date    Abnormal Pap smear of cervix     ASCUS , ASCUS cannot exclude HGIL     Anemia     Depression     hx of depression     GERD (gastroesophageal reflux disease)     resolved per pt     History of removal of ovarian cyst     Increased BMI     Shingles     with pregnancy    Subcutaneous cyst     Varicella     had chicken pox as child/shingles with pregnancy     Patient Active Problem List   Diagnosis    History of abnormal cervical Papanicolaou smear    Pap smear of cervix with ASCUS, cannot exclude HGSIL    Anemia during pregnancy in third trimester    History of bariatric surgery    Anemia    39 weeks gestation of pregnancy    Rh negative state in antepartum period    Subcutaneous cyst    GBS (group B Streptococcus carrier), +RV culture, currently pregnant     (spontaneous vaginal delivery)    MAT III (cervical intraepithelial neoplasia III)       Past Surgical History:   Procedure Laterality Date    APPENDECTOMY      BARIATRIC SURGERY      GASTRIC BYPASS          OVARIAN CYST REMOVAL             OB History        3    Para   1    Term   1            AB   2    Living   1       SAB        TAB        Ectopic        Multiple   0    Live Births   1           Obstetric Comments   Anemia, Rh negative, bariatric surgery, 2 VIP,cigarette smoker quit with pregancy                 Social History   Social History     Substance and Sexual Activity   Alcohol Use Yes    Comment: occ     Social History     Substance and Sexual Activity   Drug Use No     Social History     Tobacco Use   Smoking Status Current Some Day Smoker    Packs/day: 0 00    Years: 9 00    Pack years: 0 00    Types: Cigarettes    Last attempt to quit: 3/19/2019    Years since quittin 9   Smokeless Tobacco Never Used   Tobacco Comment    started to smoke again here and there          Current Outpatient Medications:     acetaminophen (TYLENOL) 325 mg tablet, Take 2 tablets (650 mg total) by mouth every 4 (four) hours as needed for mild pain or headaches, Disp: 30 tablet, Rfl: 0    [employer] D3 1000 units capsule, Take 2,000 Units by mouth daily, Disp: , Rfl: 12    Docusate Sodium (DOK) 100 MG capsule, Take 1 tablet (100 mg total) by mouth 2 (two) times a day as needed for constipation, Disp: 10 tablet, Rfl: 0    ferrous gluconate (FERGON) 324 mg tablet, Take 324 mg by mouth daily with breakfast, Disp: , Rfl:     Prenatal MV-Min-Fe Fum-FA-DHA (PRENATAL 1 PO), Take by mouth, Disp: , Rfl:     Allergies   Allergen Reactions    Motrin [Ibuprofen]      Hx gastric bypass       Objective   Vitals: Blood pressure 124/72, weight 82 5 kg (181 lb 12 8 oz), not currently breastfeeding  Body mass index is 30 25 kg/m²  General: NAD, AAOx3  Heart: non-tachycardic  Lungs: non-labored breathing, symmetric chest rise    Pelvic: normal appearing external female genitalia  Cervix parous appearing, well-healed  Mirena IUD strings protruding 4 cm from external os  No bleeding, no abnormal discharge    Lab Results:   No visits with results within 1 Day(s) from this visit     Latest known visit with results is:   Procedure visit on 2020   Component Date Value    URINE HCG 2020 negative     Case Report 2020                      Value:Surgical Pathology Report Case: [id number]                                   Authorizing Provider:  Ca Mallory MD          Collected:           01/27/2020 1608              Ordering Location:     [address]:            01/27/2020 [address] [facility] [address]                                                                    Pathologist:           Nan Claude, MD                                                         Specimens:   A) - Cervix, posterior lip with stitch at 6 o'clock                                                 B) - Cervix, anterior lip with stitch at 12 o'clock                                        Final Diagnosis 01/27/2020                      Value: This result contains rich text formatting which cannot be displayed here   Additional Information 01/27/2020                      Value: This result contains rich text formatting which cannot be displayed here  Mary Splinter Gross Description 01/27/2020                      Value: This result contains rich text formatting which cannot be displayed here   Clinical Information 01/27/2020                      Value:MAT 2-3        Assessment/Plan     Assessment:  34 y o  [id number] s/p LEEP    Diagnoses and all orders for this visit:    S/P LEEP of cervix  - Reviewed pathology findings, MAT II at margins, need for repeat Pap smear cotesting and ECC in 4 months, already has appointment scheduled  - Cleared from pelvic rest today  Other orders  -     Prenatal MV-Min-Fe Fum-FA-DHA (PRENATAL 1 PO); Take by mouth    Return for annual or PRN

## 2020-04-28 ENCOUNTER — TELEPHONE (OUTPATIENT)
Dept: FAMILY MEDICINE CLINIC | Facility: CLINIC | Age: 30
End: 2020-04-28

## 2020-05-19 ENCOUNTER — OFFICE VISIT (OUTPATIENT)
Dept: OBGYN CLINIC | Facility: CLINIC | Age: 30
End: 2020-05-19
Payer: COMMERCIAL

## 2020-05-19 VITALS
DIASTOLIC BLOOD PRESSURE: 76 MMHG | TEMPERATURE: 97.5 F | WEIGHT: 177.4 LBS | SYSTOLIC BLOOD PRESSURE: 126 MMHG | BODY MASS INDEX: 29.52 KG/M2

## 2020-05-19 DIAGNOSIS — Z11.51 SCREENING FOR HPV (HUMAN PAPILLOMAVIRUS): ICD-10-CM

## 2020-05-19 DIAGNOSIS — D06.9 CARCINOMA IN SITU OF CERVIX, UNSPECIFIED LOCATION: Primary | ICD-10-CM

## 2020-05-19 LAB — SL AMB POCT URINE HCG: NORMAL

## 2020-05-19 PROCEDURE — 99213 OFFICE O/P EST LOW 20 MIN: CPT | Performed by: STUDENT IN AN ORGANIZED HEALTH CARE EDUCATION/TRAINING PROGRAM

## 2020-05-19 PROCEDURE — 88175 CYTOPATH C/V AUTO FLUID REDO: CPT | Performed by: STUDENT IN AN ORGANIZED HEALTH CARE EDUCATION/TRAINING PROGRAM

## 2020-05-19 PROCEDURE — 87624 HPV HI-RISK TYP POOLED RSLT: CPT | Performed by: STUDENT IN AN ORGANIZED HEALTH CARE EDUCATION/TRAINING PROGRAM

## 2020-05-19 PROCEDURE — 88305 TISSUE EXAM BY PATHOLOGIST: CPT | Performed by: PATHOLOGY

## 2020-05-19 PROCEDURE — 81025 URINE PREGNANCY TEST: CPT | Performed by: STUDENT IN AN ORGANIZED HEALTH CARE EDUCATION/TRAINING PROGRAM

## 2020-05-21 LAB
HPV HR 12 DNA CVX QL NAA+PROBE: NEGATIVE
HPV16 DNA CVX QL NAA+PROBE: NEGATIVE
HPV18 DNA CVX QL NAA+PROBE: NEGATIVE

## 2020-05-22 ENCOUNTER — TELEPHONE (OUTPATIENT)
Dept: OBGYN CLINIC | Facility: CLINIC | Age: 30
End: 2020-05-22

## 2020-05-26 LAB
LAB AP GYN PRIMARY INTERPRETATION: NORMAL
Lab: NORMAL

## 2021-02-10 ENCOUNTER — TELEPHONE (OUTPATIENT)
Dept: PSYCHIATRY | Facility: CLINIC | Age: 31
End: 2021-02-10

## 2021-03-01 ENCOUNTER — OFFICE VISIT (OUTPATIENT)
Dept: FAMILY MEDICINE CLINIC | Facility: CLINIC | Age: 31
End: 2021-03-01

## 2021-03-01 VITALS
DIASTOLIC BLOOD PRESSURE: 74 MMHG | HEART RATE: 80 BPM | BODY MASS INDEX: 28.59 KG/M2 | HEIGHT: 65 IN | SYSTOLIC BLOOD PRESSURE: 124 MMHG | WEIGHT: 171.6 LBS | TEMPERATURE: 97.1 F | RESPIRATION RATE: 16 BRPM

## 2021-03-01 DIAGNOSIS — G89.29 CHRONIC BILATERAL THORACIC BACK PAIN: Primary | ICD-10-CM

## 2021-03-01 DIAGNOSIS — M54.6 CHRONIC BILATERAL THORACIC BACK PAIN: Primary | ICD-10-CM

## 2021-03-01 PROCEDURE — 3008F BODY MASS INDEX DOCD: CPT | Performed by: FAMILY MEDICINE

## 2021-03-01 PROCEDURE — 99213 OFFICE O/P EST LOW 20 MIN: CPT | Performed by: FAMILY MEDICINE

## 2021-03-01 PROCEDURE — 3725F SCREEN DEPRESSION PERFORMED: CPT | Performed by: FAMILY MEDICINE

## 2021-03-01 NOTE — PROGRESS NOTES
Nivia Murry 1990 female MRN: [id number]    Pre-Operative Clearance Visit    Nivia Murry is a 27 y o  female with *** who is planning to undergo *** under {procedures; anesthesia:812} by *** on ***  Patient {HAS HAS OPS:69559} had complications with anesthesia in the past     ROS:   Chest pain: {YES/NO:20200}   Shortness of breath: {YES/NO:20200}  Shortness of breath with exertion: {YES/NO:20200}  Orthopnea: {YES/NO:20200}  Dizziness: {YES/NO:20200}  Unexplained weight change: {YES/NO:20200}    PMH:  CAD: {YES/NO:20200}  HTN: {YES/NO:20200}  CKD: {YES/NO:20200}  DM: {YES/NO:20200} on insulin: {YES/NO:20200}  History of CVA: {YES/NO:20200}     reports that she has been smoking cigarettes  She has been smoking about 0 00 packs per day for the past 9 00 years  She has never used smokeless tobacco  She reports current alcohol use  She reports that she does not use drugs  No results found for: CREATININE    There are no diagnoses linked to this encounter  Recommendations:  Nivia Murry is undergoing an elective {Risk diagnosis:09226} surgery  They are at {Risk diagnosis:39534} risk for major adverse cardiac event (MACE)  They {Actions; may/not:85224} proceed with surgery as planned {WITH/WITHOUT:03420} further workup        Rosalba Lang MD  PGY-1, Family Medicine  03/01/21

## 2021-03-01 NOTE — PROGRESS NOTES
Assessment/Plan:    Chronic bilateral thoracic back pain  History of back pain not relieved by Physical therapy and over-the-counter medications  On exam patient has poor posture with hunched and rounded shoulders  Upper back and neck is tense to palpation  Will refer patient to cosmetic surgeon for breast reduction per her request            Problem List Items Addressed This Visit        Other    Chronic bilateral thoracic back pain - Primary     History of back pain not relieved by Physical therapy and over-the-counter medications  On exam patient has poor posture with hunched and rounded shoulders  Upper back and neck is tense to palpation  Will refer patient to cosmetic surgeon for breast reduction per her request                    Subjective:      Patient ID: Manish Sesay is a 27 y o  female  27-year-old presents to clinic for chronic upper back and neck pain due to it large breasts  She has had pain for few years and got worse after her pregnancy  She has tried over-the-counter medications and Jovany classes without improvement  She requests referral to cosmetic surgeon for breast reduction in efforts to help her pain  The following portions of the patient's history were reviewed and updated as appropriate: allergies, current medications, past family history, past medical history, past social history, past surgical history and problem list     Review of Systems   Constitutional: Negative for chills and fever  Eyes: Negative for visual disturbance  Respiratory: Negative for cough and shortness of breath  Cardiovascular: Negative for chest pain and palpitations  Gastrointestinal: Negative for abdominal pain, nausea and vomiting  Musculoskeletal: Positive for back pain and neck pain  Negative for gait problem, joint swelling, myalgias and neck stiffness  Neurological: Negative for dizziness and headaches  All other systems reviewed and are negative          Objective:      BP 124/74 (BP Location: Left arm, Patient Position: Sitting, Cuff Size: Large)   Pulse 80   Temp (!) 97 1 °F (36 2 °C) (Tympanic)   Resp 16   Ht 5' 5" (1 651 m)   Wt 77 8 kg (171 lb 9 6 oz)   BMI 28 56 kg/m²          Physical Exam  Vitals signs and nursing note reviewed  Constitutional:       General: She is not in acute distress  Appearance: Normal appearance  She is not ill-appearing, toxic-appearing or diaphoretic  Cardiovascular:      Rate and Rhythm: Normal rate and regular rhythm  Pulses: Normal pulses  Heart sounds: Normal heart sounds  Pulmonary:      Effort: Pulmonary effort is normal    Musculoskeletal: Normal range of motion  Comments: Poor posture with rounded shoulders  Limited range of motion with neck movement in all directions  Vertebral column nontender however upper back is tense along with upper neck  Neurological:      Mental Status: She is alert

## 2021-03-01 NOTE — ASSESSMENT & PLAN NOTE
History of back pain not relieved by Physical therapy and over-the-counter medications  On exam patient has poor posture with hunched and rounded shoulders  Upper back and neck is tense to palpation    Will refer patient to cosmetic surgeon for breast reduction per her request

## 2021-03-30 DIAGNOSIS — Z23 ENCOUNTER FOR IMMUNIZATION: ICD-10-CM

## 2021-04-01 ENCOUNTER — IMMUNIZATIONS (OUTPATIENT)
Dept: FAMILY MEDICINE CLINIC | Facility: HOSPITAL | Age: 31
End: 2021-04-01

## 2021-04-01 DIAGNOSIS — Z23 ENCOUNTER FOR IMMUNIZATION: Primary | ICD-10-CM

## 2021-04-01 PROCEDURE — 0001A SARS-COV-2 / COVID-19 MRNA VACCINE (PFIZER-BIONTECH) 30 MCG: CPT

## 2021-04-01 PROCEDURE — 91300 SARS-COV-2 / COVID-19 MRNA VACCINE (PFIZER-BIONTECH) 30 MCG: CPT

## 2021-04-05 ENCOUNTER — TELEPHONE (OUTPATIENT)
Dept: FAMILY MEDICINE CLINIC | Facility: CLINIC | Age: 31
End: 2021-04-05

## 2021-04-05 DIAGNOSIS — G89.29 CHRONIC BILATERAL THORACIC BACK PAIN: ICD-10-CM

## 2021-04-05 DIAGNOSIS — M54.6 CHRONIC BILATERAL THORACIC BACK PAIN: ICD-10-CM

## 2021-04-05 DIAGNOSIS — N62 LARGE BREASTS: Primary | ICD-10-CM

## 2021-04-05 NOTE — TELEPHONE ENCOUNTER
Patient calling and stated, " I saw Dr Rhonda Angela on 3/1/21 and at that visit he said he would refer me to a cosmetic surgeon for breast reduction  I need Dr Rhonda Angela to give me that referral for my insurance company for breast reduction surgery as I already went to a surgeon  Needs to be sent to, Dr Ayo Ortiz at [facility]      Attention to:   Marisol Camacho    office # is [phone]    Fax # is [phone]

## 2021-04-24 ENCOUNTER — IMMUNIZATIONS (OUTPATIENT)
Dept: FAMILY MEDICINE CLINIC | Facility: HOSPITAL | Age: 31
End: 2021-04-24

## 2021-04-24 DIAGNOSIS — Z23 ENCOUNTER FOR IMMUNIZATION: Primary | ICD-10-CM

## 2021-04-24 PROCEDURE — 91300 SARS-COV-2 / COVID-19 MRNA VACCINE (PFIZER-BIONTECH) 30 MCG: CPT

## 2021-04-24 PROCEDURE — 0002A SARS-COV-2 / COVID-19 MRNA VACCINE (PFIZER-BIONTECH) 30 MCG: CPT

## 2021-05-21 NOTE — ED ATTENDING ATTESTATION
Lisa Sawant MD, saw and evaluated the patient  I have discussed the patient with the resident/non-physician practitioner and agree with the resident's/non-physician practitioner's findings, Plan of Care, and MDM as documented in the resident's/non-physician practitioner's note, except where noted  All available labs and Radiology studies were reviewed  I was present for key portions of any procedure(s) performed by the resident/non-physician practitioner and I was immediately available to provide assistance  At this point I agree with the current assessment done in the Emergency Department  I have conducted an independent evaluation of this patient a history and physical is as follows:    Patient presents for evaluation of right groin abscess  Patient has history similar episodes previously due to ingrown hairs  Patient states that the area has been present for approximately 5 days  No additional complaints  Exam: AAOx3, NAD, or abscess lateral and inferior to right leg  A/P:  Abscess  Will I&D      Critical Care Time  Procedures Nurse's Notes                                                                                     

 [facility]                                                                 

Name: Reynold Mcmillan                                                                          

Age: 19 yrs                                                                                       

Sex: Male                                                                                         

: 2001                                                                                   

MRN: [id number]                                                                                   

Arrival Date: 2021                                                                          

Time: 19:50                                                                                       

Account#: [id number]                                                                            

Bed 24                                                                                            

Private MD:                                                                                       

Diagnosis: Acute upper respiratory infection, unspecified                                         

                                                                                                  

Presentation:                                                                                     

                                                                                             

19:53 Chief complaint: Patient states: Diarrhea, cough, sore throat, runny nose for the past  vg1 

      two days. Coronavirus screen: Client denies travel out of the [place] in the last 14 days.     

      Client presents with at least one sign or symptom that may indicate coronavirus-19.         

      Standard/surgical mask placed on the client. Ebola Screen: Patient negative for fever       

      greater than or equal to 101.5 degrees Fahrenheit, and additional compatible Ebola          

      Virus Disease symptoms. Initial Sepsis Screen: Does the patient meet any 2 criteria?        

      No. Patient's initial sepsis screen is negative. Does the patient have a suspected          

      source of infection? No. Patient's initial sepsis screen is negative. Risk Assessment:      

      Do you want to hurt yourself or someone else? Patient reports no desire to harm self or     

      others. Onset of symptoms was May 19, 2021.                                                 

19:53 Method Of Arrival: Ambulatory                                                           vg1 

19:53 Acuity: ROSIE 4                                                                           vg1 

                                                                                                  

Triage Assessment:                                                                                

19:55 General: Appears in no apparent distress. uncomfortable, Behavior is calm, cooperative. vg1 

      Pain: Complains of pain in generalized body aches Pain currently is 8 out of 10 on a        

      pain scale.                                                                                 

                                                                                                  

Historical:                                                                                       

- Allergies:                                                                                      

19:55 No Known Allergies;                                                                     vg1 

- Home Meds:                                                                                      

19:55 None [Active];                                                                          vg1 

- PMHx:                                                                                           

19:55 None;                                                                                   vg1 

- PSHx:                                                                                           

19:55 Appendectomy;                                                                           vg1 

                                                                                                  

- Immunization history:: Adult Immunizations up to date.                                          

- Social history:: Smoking status: Patient denies any tobacco usage or history of.                

                                                                                                  

                                                                                                  

Screenin:00 Abuse screen: Denies threats or abuse. Denies injuries from another. Nutritional        rr5 

      screening: No deficits noted. Tuberculosis screening: No symptoms or risk factors           

      identified. Fall Risk None identified. Total [place] Fall Scale indicates No Risk (0-24       

      pts).                                                                                       

                                                                                                  

Assessment:                                                                                       

19:57 Reassessment: Pt treated in triage.                                                     vg1 

21:00 General: Appears in no apparent distress. comfortable, Behavior is calm, cooperative,   rr5 

      appropriate for age, no complaints awaiting for result. Pain: Denies pain. Neuro: Level     

      of Consciousness is awake, alert, obeys commands, Oriented to person, place, time.          

      Cardiovascular: Capillary refill < 3 seconds Patient's skin is warm and dry.                

      Respiratory: Reports cough that is runny nose Airway is patent Respiratory effort is        

      even, unlabored, Respiratory pattern is regular, symmetrical. GI: Reports diarrhea. :     

      No signs and/or symptoms were reported regarding the genitourinary system. EENT: No         

      signs and/or symptoms were reported regarding the EENT system. Derm: Skin is intact, is     

      healthy with good turgor, Skin temperature is warm. Musculoskeletal: Capillary refill <     

      3 seconds.                                                                                  

22:20 Reassessment: Patient appears in no apparent distress at this time. Patient is alert,   rr5 

      oriented x 3, equal unlabored respirations, skin warm/dry/pink. discharge instruction       

      given and explained without complaints made.                                                

                                                                                                  

Vital Signs:                                                                                      

19:53  / 83; Pulse 86; Resp 18; Temp 99.2; Pulse Ox 98% on R/A; Weight 115.67 kg;       vg1 

      Height 5 ft. 8 in. (172.72 cm); Pain 8/10;                                                  

21:00  / 75; Pulse 80; Resp 16; Pulse Ox 98% ;                                          rr5 

22:00  / 89; Pulse 86; Resp 17; Pulse Ox 98% ;                                          rr5 

19:53 Body Mass Index 38.77 (115.67 kg, 172.72 cm)                                            vg1 

                                                                                                  

ED Course:                                                                                        

19:50 Patient arrived in ED.                                                                  mr  

19:51 [facility]                                                        kb  

19:51 David Garcia MD is Attending Physician.                                             kb  

19:54 Triage completed.                                                                       vg1 

19:55 Arm band placed on Patient placed in waiting room, Patient notified of wait time.       vg1 

20:00 COVID swab sent to lab. Flu and/or RSV swab sent to lab. Strep swab sent to lab.        vg1 

20:33 Mohit Villeda RN is Primary Nurse.                                                    rr5 

21:00 Patient has correct armband on for positive identification. Bed in low position.        rr5 

22:21 No provider procedures requiring assistance completed. Patient did not have IV access   rr5 

      during this emergency room visit.                                                           

                                                                                                  

Administered Medications:                                                                         

No medications were administered                                                                  

                                                                                                  

                                                                                                  

Outcome:                                                                                          

22:15 Discharge ordered by MD.                                                                joe  

22:21 Discharged to home ambulatory.                                                          rr5 

22:21 Condition: stable                                                                           

22:21 Discharge instructions given to patient, Instructed on discharge instructions, follow       

      up and referral plans. Demonstrated understanding of instructions, follow-up care.          

22:22 Patient left the ED.                                                                    rr5 

                                                                                                  

Signatures:                                                                                       

Sejal Priest, TANIKA HERNANDES-Rolanda Julien Raymond RN                      RN   rr5                                                  

Nai Diamond RN                    RN   vg1                                                  

                                                                                                  

**************************************************************************************************

## 2021-08-04 PROBLEM — O26.899 RH NEGATIVE STATE IN ANTEPARTUM PERIOD: Status: RESOLVED | Noted: 2019-08-12 | Resolved: 2021-08-04

## 2021-08-04 PROBLEM — O99.820 GBS (GROUP B STREPTOCOCCUS CARRIER), +RV CULTURE, CURRENTLY PREGNANT: Status: RESOLVED | Noted: 2019-09-25 | Resolved: 2021-08-04

## 2021-08-04 PROBLEM — O99.013 ANEMIA DURING PREGNANCY IN THIRD TRIMESTER: Status: RESOLVED | Noted: 2019-04-29 | Resolved: 2021-08-04

## 2021-08-04 PROBLEM — Z67.91 RH NEGATIVE STATE IN ANTEPARTUM PERIOD: Status: RESOLVED | Noted: 2019-08-12 | Resolved: 2021-08-04

## 2021-08-06 ENCOUNTER — ANNUAL EXAM (OUTPATIENT)
Dept: OBGYN CLINIC | Facility: CLINIC | Age: 31
End: 2021-08-06
Payer: COMMERCIAL

## 2021-08-06 VITALS — BODY MASS INDEX: 26.43 KG/M2 | DIASTOLIC BLOOD PRESSURE: 82 MMHG | SYSTOLIC BLOOD PRESSURE: 108 MMHG | WEIGHT: 158.8 LBS

## 2021-08-06 DIAGNOSIS — Z01.419 ENCOUNTER FOR ANNUAL ROUTINE GYNECOLOGICAL EXAMINATION: Primary | ICD-10-CM

## 2021-08-06 DIAGNOSIS — D06.9 CARCINOMA IN SITU OF CERVIX, UNSPECIFIED LOCATION: ICD-10-CM

## 2021-08-06 DIAGNOSIS — Z71.85 COUNSELING FOR HPV (HUMAN PAPILLOMAVIRUS) VACCINATION: ICD-10-CM

## 2021-08-06 PROCEDURE — 99395 PREV VISIT EST AGE 18-39: CPT | Performed by: STUDENT IN AN ORGANIZED HEALTH CARE EDUCATION/TRAINING PROGRAM

## 2021-08-06 PROCEDURE — G0145 SCR C/V CYTO,THINLAYER,RESCR: HCPCS | Performed by: STUDENT IN AN ORGANIZED HEALTH CARE EDUCATION/TRAINING PROGRAM

## 2021-08-06 PROCEDURE — G0476 HPV COMBO ASSAY CA SCREEN: HCPCS | Performed by: STUDENT IN AN ORGANIZED HEALTH CARE EDUCATION/TRAINING PROGRAM

## 2021-08-06 RX ORDER — CEPHALEXIN 500 MG/1
CAPSULE ORAL
COMMUNITY
Start: 2021-05-24 | End: 2022-04-11

## 2021-08-06 RX ORDER — IBUPROFEN 800 MG/1
800 TABLET ORAL 3 TIMES DAILY PRN
COMMUNITY
Start: 2021-05-28 | End: 2022-04-11

## 2021-08-06 RX ORDER — CHLORHEXIDINE GLUCONATE 0.12 MG/ML
RINSE ORAL
COMMUNITY
Start: 2021-06-11

## 2021-08-06 RX ORDER — DOXYCYCLINE HYCLATE 50 MG/1
324 CAPSULE, GELATIN COATED ORAL
COMMUNITY
End: 2022-04-11

## 2021-08-06 RX ORDER — OXYCODONE HYDROCHLORIDE 5 MG/1
TABLET ORAL
COMMUNITY
Start: 2021-05-24

## 2021-08-06 RX ORDER — METHOCARBAMOL 500 MG/1
TABLET, FILM COATED ORAL
COMMUNITY
Start: 2021-06-08 | End: 2022-04-11

## 2021-08-06 NOTE — ASSESSMENT & PLAN NOTE
12/2016 Pap ASCUS  3/13/2019 Pap ASCUS  3/28/2019 colpo no biopsies due to pregnancy, low grade lesion suspected, plan for colposcopy postpartum  12/3/2019 colpo postpartum MAT III on ECC, MAT II at 1:00 and 6:00  1/27/2020 LEEP posterior lip MAT II, positive endocervical margin  anterior lip MAT II, positive endocervical margin and exocervical margin (MAT I)  5/19/2020 Pap NILM HPV neg   ECC benign

## 2021-08-06 NOTE — PROGRESS NOTES
Chief complaint: annual exam, established patient    Chief Complaint   Patient presents with    Gynecologic Exam     annual exam  LMP: 7/18  last pap: 5/19/20-neg/neg  ECC was normal  s/p LEEP  Assessment/Plan     32 y o  [id number] with normal annual gynecologic examination  Problem List Items Addressed This Visit        Genitourinary    MAT III (cervical intraepithelial neoplasia III)     12/2016 Pap ASCUS  3/13/2019 Pap ASCUS  3/28/2019 colpo no biopsies due to pregnancy, low grade lesion suspected, plan for colposcopy postpartum  12/3/2019 colpo postpartum MAT III on ECC, MAT II at 1:00 and 6:00  1/27/2020 LEEP posterior lip MAT II, positive endocervical margin  anterior lip MAT II, positive endocervical margin and exocervical margin (MAT I)  5/19/2020 Pap NILM HPV neg  ECC benign            Other    Counseling for HPV (human papillomavirus) vaccination     Reviewed recommendations for HPV vaccination, especially in light of abnormal Pap and LEEP           Other Visit Diagnoses     Encounter for annual routine gynecological examination    -  Primary    Relevant Medications    Multiple Vitamins-Minerals (Womens Multivitamin) TABS    Other Relevant Orders    Liquid-based pap, screening        Normal findings on routine gynecologic exam today  Reviewed annual clinical breast exam    Discussed ASCCP guidelines and Pap smear with cotesting frequency, collected today  STD/STI testing offered, declined  Encouraged daily calcium and vitamin D intake was well as weight bearing exercise daily for bone health  Plan to continue current contraceptive method per patient preference (Mirena IUD)  Follow up PRN or for annual exam   --------------------------------------------------------  History of Present Illness     Gerardo Cummings is a 32 y o  female [id number] No LMP recorded  Patient has had an implant  Mirena for contraception who presents for annual examination      Concerns today: wondering what next steps are if Pap smear abnormal again  Not interested in future fertility    New updates: little one not so little anymore    Health maintenance  Last pap smear: 2020 NILM / HPV neg  Bone density scan: n/a  Last mammogram: Not on file n/a  Last colonoscopy: Not on file n/a  HPV vaccination?: no    GYN History  Menarche age 6  No LMP recorded  Patient has had an implant    Frequency q30 days lasting 3 days  Regular periods  Positive history abnormal Pap smears  History LEEP    Sexually active? yes  Current sexual partner(s): boyfriend  History of STD: no  Interested in STD screening today? 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    OB History    Para Term  AB Living   3 1 1   2 1   SAB TAB Ectopic Multiple Live Births         0 1      # Outcome Date GA Lbr Bong/2nd Weight Sex Delivery Anes PTL Lv   3 Term 10/20/19 40w0d / 02:08 4423 g (9 lb 12 oz) F Vag-Spont EPI N JOSIAH   2 AB 17           1 AB 06              Obstetric Comments   Anemia, Rh negative, bariatric surgery, 2 VIP,cigarette smoker quit with pregancy     # 1 - Date: 06, Sex: None, Weight: None, GA: None, Delivery: None, Apgar1: None, Apgar5: None, Living: None, Birth Comments: None    # 2 - Date: 17, Sex: None, Weight: None, GA: None, Delivery: None, Apgar1: None, Apgar5: None, Living: None, Birth Comments: None    # 3 - Date: 10/20/19, Sex: Female, Weight: 4423 g (9 lb 12 oz), GA: 40w0d, Delivery: Vaginal, Spontaneous, Apgar1: 8, Apgar5: 9, Living: Living, Birth Comments: None    Past Medical History:   Diagnosis Date    Abnormal Pap smear of cervix     ASCUS , ASCUS cannot exclude HGIL     Anemia     Depression     hx of depression     GERD (gastroesophageal reflux disease)     resolved per pt     History of removal of ovarian cyst     Increased BMI     Shingles     with pregnancy    Subcutaneous cyst     Varicella     had chicken pox as child/shingles with pregnancy     Past Surgical History:   Procedure Laterality Date    APPENDECTOMY      BARIATRIC SURGERY      GASTRIC BYPASS          OTHER SURGICAL HISTORY  2021    bilat breast reduction and lift    OVARIAN CYST REMOVAL      2011     Family History   Problem Relation Age of Onset    No Known Problems Mother     No Known Problems Father     No Known Problems Sister     No Known Problems Brother     Cancer Maternal Grandmother         Lung    Heart failure Maternal Grandfather     No Known Problems Paternal Grandmother     No Known Problems Paternal Grandfather      Social History   Social History     Substance and Sexual Activity   Alcohol Use Yes    Comment: occ     Social History     Substance and Sexual Activity   Drug Use No     Social History     Tobacco Use   Smoking Status Current Some Day Smoker    Packs/day: 0 00    Years: 9 00    Pack years: 0 00    Types: Cigarettes    Last attempt to quit: 3/19/2019    Years since quittin 3   Smokeless Tobacco Never Used   Tobacco Comment    started to smoke again here and there        Current Outpatient Medications:     acetaminophen (TYLENOL) 325 mg tablet, Take 2 tablets (650 mg total) by mouth every 4 (four) hours as needed for mild pain or headaches, Disp: 30 tablet, Rfl: 0    chlorhexidine (PERIDEX) 0 12 % solution, PLEASE SEE ATTACHED FOR DETAILED DIRECTIONS, Disp: , Rfl:     Docusate Sodium (DOK) 100 MG capsule, Take 1 tablet (100 mg total) by mouth 2 (two) times a day as needed for constipation, Disp: 10 tablet, Rfl: 0    levonorgestrel (MIRENA) 20 MCG/24HR IUD, 1 each by Intrauterine route, Disp: , Rfl:     cephalexin (KEFLEX) 500 mg capsule, TAKE 1 CAPSULE BY MOUTH THREE TIMES A DAY FOR 7 DAYS (Patient not taking: Reported on 2021), Disp: , Rfl:     ferrous gluconate (FERGON) 324 mg tablet, Take 324 mg by mouth (Patient not taking: Reported on 2021), Disp: , Rfl:     ibuprofen (MOTRIN) 800 mg tablet, Take 800 mg by mouth 3 (three) times a day as needed (Patient not taking: Reported on 8/6/2021), Disp: , Rfl:     methocarbamol (ROBAXIN) 500 mg tablet, TAKE 1 TABLET (500 MG TOTAL) BY MOUTH 4 (FOUR) TIMES A DAY AS NEEDED FOR MUSCLE SPASMS  (Patient not taking: Reported on 8/6/2021), Disp: , Rfl:     Multiple Vitamins-Minerals (Womens Multivitamin) TABS, Take 1 tablet by mouth daily, Disp: 60 tablet, Rfl: 2    oxyCODONE (ROXICODONE) 5 mg immediate release tablet, TAKE 1 TABLET BY MOUTH EVERY 6 HOURS AS NEEDED FOR MODERATE PAIN  MAX DAILY AMOUNT  20 MG (Patient not taking: Reported on 8/6/2021), Disp: , Rfl:   Allergies   Allergen Reactions    Motrin [Ibuprofen]      Hx gastric bypass       REVIEW OF SYSTEMS  CONSTITUTIONAL:  No weight loss, fever, chills, weakness  HEENT: No visual loss, blurred vision  SKIN: No rash or itching  CARDIOVASCULAR: No chest pain, chest pressure, or chest discomfort  RESPIRATORY: No shortness of breath, cough or sputum  GASTROINTESTINAL: No nausea, emesis, or diarrhea  NEUROLOGICAL: No headache, dizziness, syncope, paralysis  MUSCUOSKELETAL: No muscle, back pain, joint stiffness or bruising  INFECTIOUS: No fever, chills  PSYCHIATRIC: No disorder of thought or mood  HEMATOLOGIC: No easy bruising or bleeding  GYN: No abnormal bleeding  NoNo involuntary urine loss  No pain with intercourse  No vaginal dryness    Objective   Vitals: Blood pressure 108/82, weight 72 kg (158 lb 12 8 oz), not currently breastfeeding  Body mass index is 26 43 kg/m²  General: NAD, AAOx3  Heart: RRR  Lungs: CTAB  Neck: supple, no thyromegaly or thyroid nodules appreciated  Breast: nipples everted bilaterally, no skin changes  No dimpling, redness, or erythema  No breast masses or axillary masses bilaterally   Healing incisions from previous breast reduction  Abdomen: soft, non-distended, non tender to palpation  Extremities: non-tender to palpation  Speculum exam: Normal appearing external genitalia, normal hair distribution  Urethra well-suspended, no clitoromegaly noted  Vagina pink moist well-rugated  Physiologic discharge noted  Cervix without lesion, parous appearing  Well-suspended cervix off to patient right, well-healed  no blood in vaginal vault    Pelvic exam: No cervical motion tenderness  No adnexal masses or tenderness  anteverted uterus, normal size  Lab Results:   No visits with results within 1 Day(s) from this visit  Latest known visit with results is:   Office Visit on 05/19/2020   Component Date Value    Case Report 05/19/2020                      Value:Surgical Pathology Report                         Case: [id number]                                   Authorizing Provider:  Zeynep Rich MD          Collected:           05/19/2020 1633              Ordering Location:     [facility] [place]:            05/19/2020 [address] [facility]                                                                    Pathologist:           Zeianb Emery MD                                                              Specimen:    Endocervical, ECC                                                                          Final Diagnosis 05/19/2020                      Value: This result contains rich text formatting which cannot be displayed here   Note 05/19/2020                      Value: This result contains rich text formatting which cannot be displayed here   Additional Information 05/19/2020                      Value: This result contains rich text formatting which cannot be displayed here  Lacy Sruthi Gross Description 05/19/2020                      Value: This result contains rich text formatting which cannot be displayed here      URINE HCG 05/19/2020 neg     Case Report 05/19/2020                      Value:Gynecologic Cytology Report Case: [id number]                                  Authorizing Provider:  Darell Riedel, MD          Collected:           05/19/2020 1632              Ordering Location:     [facility] [place]:            05/19/2020 [address] [place] [address]                                     [facility]                                                                               [facility] Screen:          MEDARDO Payne                                                    Specimen:    LIQUID-BASED PAP, DIAGNOSTIC, Cervix                                                       Primary Interpretation 05/19/2020 Negative for intraepithelial lesion or malignancy     Specimen Adequacy 05/19/2020 Satisfactory for evaluation  Endocervical/transformation zone component present   Additional Information 05/19/2020                      Value: This result contains rich text formatting which cannot be displayed here      HPV Other HR 05/19/2020 Negative     HPV16 05/19/2020 Negative     HPV18 05/19/2020 Negative

## 2021-08-10 ENCOUNTER — TELEPHONE (OUTPATIENT)
Dept: OBGYN CLINIC | Facility: CLINIC | Age: 31
End: 2021-08-10

## 2021-08-12 NOTE — TELEPHONE ENCOUNTER
I called pt back and informed her pap results are still in process, told her unsure when they would be done and as soon as they are Dr Jolanta Johnson will be notified and once she reviews them she will send to us for further instruction  Pt asked if she should call tomorrow- I told her not to, only because we dont have any idea when lab will result  I did say she can call us next week if she doesn't hear anything by tues-wed   She verbalized understanding

## 2021-08-13 LAB
LAB AP GYN PRIMARY INTERPRETATION: NORMAL
Lab: NORMAL

## 2022-03-11 ENCOUNTER — TELEPHONE (OUTPATIENT)
Dept: PSYCHIATRY | Facility: CLINIC | Age: 32
End: 2022-03-11

## 2022-03-11 NOTE — TELEPHONE ENCOUNTER
MSW intern spoke to pt 3/11/22  Pt is interested in talk therapy  She could not go into details due to being in the store

## 2022-03-16 NOTE — TELEPHONE ENCOUNTER
Returned pt vm  PT would be interested in the 6 week  program but has some question before she commits  PT is looking for talk therapy for issue relating to relationship

## 2022-04-11 ENCOUNTER — OFFICE VISIT (OUTPATIENT)
Dept: FAMILY MEDICINE CLINIC | Facility: CLINIC | Age: 32
End: 2022-04-11

## 2022-04-11 VITALS
RESPIRATION RATE: 16 BRPM | HEART RATE: 93 BPM | WEIGHT: 170.2 LBS | SYSTOLIC BLOOD PRESSURE: 104 MMHG | BODY MASS INDEX: 29.06 KG/M2 | DIASTOLIC BLOOD PRESSURE: 72 MMHG | HEIGHT: 64 IN | TEMPERATURE: 98.4 F

## 2022-04-11 DIAGNOSIS — F32.A DEPRESSION, UNSPECIFIED DEPRESSION TYPE: Primary | ICD-10-CM

## 2022-04-11 PROCEDURE — 99213 OFFICE O/P EST LOW 20 MIN: CPT | Performed by: FAMILY MEDICINE

## 2022-04-11 RX ORDER — HYDROXYZINE HYDROCHLORIDE 25 MG/1
25 TABLET, FILM COATED ORAL EVERY 6 HOURS PRN
Qty: 30 TABLET | Refills: 0 | Status: SHIPPED | OUTPATIENT
Start: 2022-04-11

## 2022-04-11 NOTE — ASSESSMENT & PLAN NOTE
Patient with a history of depression since she was a child, previously took Zolof with good response, but had been off medications for a while  However, she has been having worsening feeling of depression  for the past year,since her daughter turned [age] years old  She was tearful during exam   Denies SI/HI    - PHQ 9 score of 15 consistent with moderate severe depression   - Prescribed  Zolof 50mg daily and Atarax 25mg PRN for anxiety    - Referral to  to help with therapy   - Advised to follow up in 2 weeks or sooner if she needs help

## 2022-04-11 NOTE — PROGRESS NOTES
Assessment/Plan:    Depression  Patient with a history of depression since she was a child, previously took Zolof with good response, but had been off medications for a while  However, she has been having worsening feeling of depression  for the past year,since her daughter turned [age] years old  She was tearful during exam   Denies SI/HI    - PHQ 9 score of 15 consistent with moderate severe depression   - Prescribed  Zolof 50mg daily and Atarax 25mg PRN for anxiety  - Referral to  to help with therapy   - Advised to follow up in 2 weeks or sooner if she needs help        Diagnoses and all orders for this visit:    Depression, unspecified depression type  -     sertraline (Zoloft) 50 mg tablet; Take 1 tablet (50 mg total) by mouth daily  -     hydrOXYzine HCL (ATARAX) 25 mg tablet; Take 1 tablet (25 mg total) by mouth every 6 (six) hours as needed for anxiety  -     Ambulatory Referral to Social Work Care Management Program; Future        PHQ-2/9 Depression Screening    Little interest or pleasure in doing things: 1 - several days  Feeling down, depressed, or hopeless: 2 - more than half the days  Trouble falling or staying asleep, or sleeping too much: 3 - nearly every day  Feeling tired or having little energy: 2 - more than half the days  Poor appetite or overeating: 3 - nearly every day  Feeling bad about yourself - or that you are a failure or have let yourself or your family down: 2 - more than half the days  Trouble concentrating on things, such as reading the newspaper or watching television: 1 - several days  Moving or speaking so slowly that other people could have noticed   Or the opposite - being so fidgety or restless that you have been moving around a lot more than usual: 1 - several days  Thoughts that you would be better off dead, or of hurting yourself in some way: 0 - not at all  PHQ-2 Score: 3  PHQ-2 Interpretation: POSITIVE depression screen  PHQ-9 Score: 15   PHQ-9 Interpretation: Moderately severe depression        Subjective:      Patient ID: Carlos Eduardo Sheffield is a 32 y o  female  HPI   Patient states that she had depression her whole life  But has been worsening for the past year  She is having troubles with her child father  She did not go into details but was crying through out viits  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  She endorses to having family support, she lives with her mother in law and her mother is in her life  She denies domestic violence or abuse  She smokes cigarette  She denies SI/HI      The following portions of the patient's history were reviewed and updated as appropriate: allergies, current medications, past family history, past medical history, past social history, past surgical history and problem list     Review of Systems   Constitutional: Negative for activity change and appetite change  Psychiatric/Behavioral: Positive for decreased concentration and sleep disturbance  Negative for suicidal ideas  The patient is nervous/anxious  Objective:      /72   Pulse 93   Temp 98 4 °F (36 9 °C) (Temporal)   Resp 16   Ht 5' 4" (1 626 m)   Wt 77 2 kg (170 lb 3 2 oz)   Breastfeeding Unknown   BMI 29 21 kg/m²          Physical Exam  Vitals reviewed  Constitutional:       General: She is not in acute distress  Appearance: Normal appearance  She is not ill-appearing, toxic-appearing or diaphoretic  HENT:      Head: Normocephalic and atraumatic  Eyes:      General:         Right eye: No discharge  Left eye: No discharge  Conjunctiva/sclera: Conjunctivae normal    Cardiovascular:      Rate and Rhythm: Normal rate and regular rhythm  Pulses: Normal pulses  Heart sounds: Normal heart sounds  Pulmonary:      Effort: Pulmonary effort is normal  No respiratory distress  Breath sounds: Normal breath sounds  No wheezing  Abdominal:      General: Abdomen is flat   Bowel sounds are normal  There is no distension  Palpations: Abdomen is soft  Tenderness: There is no abdominal tenderness  There is no guarding  Musculoskeletal:         General: Normal range of motion  Right lower leg: No edema  Left lower leg: No edema  Skin:     General: Skin is warm and dry  Neurological:      Mental Status: She is alert  Mental status is at baseline  Psychiatric:         Mood and Affect: Mood normal          Behavior: Behavior normal          Thought Content: Thought content normal          Judgment: Judgment normal       Comments: Tearful through out visit

## 2022-04-13 ENCOUNTER — PATIENT OUTREACH (OUTPATIENT)
Dept: FAMILY MEDICINE CLINIC | Facility: CLINIC | Age: 32
End: 2022-04-13

## 2022-04-22 ENCOUNTER — PATIENT OUTREACH (OUTPATIENT)
Dept: FAMILY MEDICINE CLINIC | Facility: CLINIC | Age: 32
End: 2022-04-22

## 2022-04-22 NOTE — PROGRESS NOTES
Received return call from patient reporting that she is interested in becoming involved with outpatient mental health treatment  Patient reports that she resides with her  and their daughter and her mother-in-law  She reports that her relationship with her MIL is a source of stress for her and she would like to speak with a therapist to address her concerns  Patient states she is feeling better and sleeping better since taking her medication ( Atarax and Zoloft)  Reviewed local in network outpatient mental health options with patient and list of same emailed to patient per her request  Supportive counseling provided to patient who denies further needs at this time  Will continue to be available to provide support

## 2022-04-25 ENCOUNTER — OFFICE VISIT (OUTPATIENT)
Dept: FAMILY MEDICINE CLINIC | Facility: CLINIC | Age: 32
End: 2022-04-25

## 2022-04-25 VITALS
OXYGEN SATURATION: 97 % | WEIGHT: 169 LBS | DIASTOLIC BLOOD PRESSURE: 85 MMHG | TEMPERATURE: 98.3 F | RESPIRATION RATE: 20 BRPM | HEART RATE: 109 BPM | HEIGHT: 64 IN | BODY MASS INDEX: 28.85 KG/M2 | SYSTOLIC BLOOD PRESSURE: 134 MMHG

## 2022-04-25 DIAGNOSIS — F17.200 TOBACCO DEPENDENCE: ICD-10-CM

## 2022-04-25 DIAGNOSIS — F32.A DEPRESSION, UNSPECIFIED DEPRESSION TYPE: Primary | ICD-10-CM

## 2022-04-25 PROCEDURE — 99213 OFFICE O/P EST LOW 20 MIN: CPT | Performed by: FAMILY MEDICINE

## 2022-04-25 NOTE — ASSESSMENT & PLAN NOTE
- Symptoms of decreased concentration, depressed mood, tearfulness and sleep are improving with current medication ( Zoloft 50 mg daily and Atarax 25 mg q h s  P r n ) which was started on 4/11/2022    - last PHQ score (4/111/2022): 15, consistent with moderate/severe depression   - Advised to continue current regimen - Zoloft and Atarax  - Brief counseling provided during today's visit  - Encouraged follow-up with therapist    provided list of providers  - will follow-up in 4-6 weeks or sooner as needed

## 2022-04-25 NOTE — PROGRESS NOTES
Assessment/Plan:    Depression  - Symptoms of decreased concentration, depressed mood, tearfulness and sleep are improving with current medication ( Zoloft 50 mg daily and Atarax 25 mg q h s  P r n ) which was started on 4/11/2022    - last PHQ score (4/111/2022): 15, consistent with moderate/severe depression   - Advised to continue current regimen - Zoloft and Atarax  - Brief counseling provided during today's visit  - Encouraged follow-up with therapist    provided list of providers  - will follow-up in 4-6 weeks or sooner as needed  Tobacco dependence  - Patient continues to smoke up to a pack of cigarettes daily  In Pre- contemplative stage   - will continue to provide counseling on tobacco cessation during next visit       Diagnoses and all orders for this visit:    Depression, unspecified depression type    Tobacco dependence        Subjective:      Patient ID: Nasario Apley is a 28 y o  female  HPI   Patient presents to the clinic for follow up on depression  She is doing better on the medications  Patient was started on Zoloft 50 mg daily and Atarax 25 mg q h s  P r n  She reports that her  Sleep has improved and mood is overall improving  She has not cried as often and she feel better overall  Patient is having troubles with her boyfriend (significant order) who she has been with for 8 years  And her boyfriend's mother who lives with them has been making things stressful for patient  Patient has a two year old daughter, who she takes care of daily  She doesn;t work  She smokes up to a pack of cigarette daily  No concerns for abuse  The following portions of the patient's history were reviewed and updated as appropriate: allergies, current medications, past family history, past medical history, past social history, past surgical history and problem list     Review of Systems   Respiratory: Negative for chest tightness and shortness of breath      Cardiovascular: Negative for chest pain and palpitations  Psychiatric/Behavioral: Negative for behavioral problems, decreased concentration, self-injury, sleep disturbance and suicidal ideas  The patient is not nervous/anxious  Objective:      /85 (BP Location: Left arm, Patient Position: Sitting, Cuff Size: Standard)   Pulse (!) 109   Temp 98 3 °F (36 8 °C) (Temporal)   Resp 20   Ht 5' 4" (1 626 m)   Wt 76 7 kg (169 lb)   SpO2 97%   BMI 29 01 kg/m²          Physical Exam  Vitals reviewed  Constitutional:       General: She is not in acute distress  Appearance: Normal appearance  She is not ill-appearing, toxic-appearing or diaphoretic  HENT:      Head: Normocephalic and atraumatic  Eyes:      General:         Right eye: No discharge  Left eye: No discharge  Conjunctiva/sclera: Conjunctivae normal    Cardiovascular:      Rate and Rhythm: Normal rate and regular rhythm  Pulses: Normal pulses  Heart sounds: Normal heart sounds  Pulmonary:      Effort: Pulmonary effort is normal  No respiratory distress  Breath sounds: Normal breath sounds  No wheezing  Abdominal:      General: Abdomen is flat  Bowel sounds are normal       Palpations: Abdomen is soft  Musculoskeletal:         General: Normal range of motion  Right lower leg: No edema  Left lower leg: No edema  Skin:     General: Skin is warm and dry  Neurological:      Mental Status: She is alert  Mental status is at baseline  Psychiatric:         Mood and Affect: Mood normal          Behavior: Behavior normal          Thought Content:  Thought content normal          Judgment: Judgment normal

## 2022-04-25 NOTE — ASSESSMENT & PLAN NOTE
- Patient continues to smoke up to a pack of cigarettes daily    In Pre- contemplative stage   - will continue to provide counseling on tobacco cessation during next visit

## 2022-04-26 ENCOUNTER — TELEPHONE (OUTPATIENT)
Dept: FAMILY MEDICINE CLINIC | Facility: CLINIC | Age: 32
End: 2022-04-26

## 2022-04-26 NOTE — TELEPHONE ENCOUNTER
Martín Wan, this patient was here yesterday and asked if you could reach out to her with the list of mental health options again? I guess you had done this in the past and she misplaced them   Thanks

## 2022-05-19 ENCOUNTER — TELEPHONE (OUTPATIENT)
Dept: FAMILY MEDICINE CLINIC | Facility: CLINIC | Age: 32
End: 2022-05-19

## 2022-05-19 NOTE — TELEPHONE ENCOUNTER
Patient called to report to Dr Yuki Velasco that she needs increase on Zoloft  She doesn't feel any different  She has f/up scheduled 6/6/22

## 2022-05-19 NOTE — TELEPHONE ENCOUNTER
Please move up patient appointment on 06/06/2022  Patient will need to follow-up to address depression  Thanks!

## 2022-05-20 ENCOUNTER — OFFICE VISIT (OUTPATIENT)
Dept: FAMILY MEDICINE CLINIC | Facility: CLINIC | Age: 32
End: 2022-05-20

## 2022-05-20 VITALS
WEIGHT: 169.2 LBS | TEMPERATURE: 96.5 F | HEIGHT: 64 IN | HEART RATE: 115 BPM | SYSTOLIC BLOOD PRESSURE: 148 MMHG | RESPIRATION RATE: 18 BRPM | DIASTOLIC BLOOD PRESSURE: 100 MMHG | BODY MASS INDEX: 28.89 KG/M2 | OXYGEN SATURATION: 98 %

## 2022-05-20 DIAGNOSIS — F17.200 TOBACCO DEPENDENCE: ICD-10-CM

## 2022-05-20 DIAGNOSIS — R03.0 ELEVATED BLOOD PRESSURE READING: ICD-10-CM

## 2022-05-20 DIAGNOSIS — F33.1 MODERATE EPISODE OF RECURRENT MAJOR DEPRESSIVE DISORDER (HCC): Primary | ICD-10-CM

## 2022-05-20 DIAGNOSIS — F32.A DEPRESSION, UNSPECIFIED DEPRESSION TYPE: ICD-10-CM

## 2022-05-20 PROBLEM — F33.9 RECURRENT MAJOR DEPRESSIVE DISORDER (HCC): Status: ACTIVE | Noted: 2022-04-11

## 2022-05-20 PROCEDURE — 99213 OFFICE O/P EST LOW 20 MIN: CPT | Performed by: FAMILY MEDICINE

## 2022-05-20 RX ORDER — BUSPIRONE HYDROCHLORIDE 7.5 MG/1
7.5 TABLET ORAL 2 TIMES DAILY
Qty: 60 TABLET | Refills: 5 | Status: SHIPPED | OUTPATIENT
Start: 2022-05-20

## 2022-05-20 NOTE — PATIENT INSTRUCTIONS
Join us for a family friendly community event to improve daily health habits  Lennie Kenyon presents:  Tune Up Tuesdays    One Tuesday evening per month  6-7pm  Location: [facility]  Learn how to start a structured exercise program in your home  Learn how to incorporate healthy eating into your daily life  Learn how social media affects you and your family's health  Interested in joining us? Please contact us at [email or web address]

## 2022-05-20 NOTE — ASSESSMENT & PLAN NOTE
- BP today: 148/92  Repeat same    - Currently asymptomatic  Denies chest pain, palpitation, dizziness, blurred vision or headaches  - Pt states she took [employer] energy drink about an hour prior to visit  Could be contributory to elevated pressure and HR   - Will follow up during next visit

## 2022-05-20 NOTE — ASSESSMENT & PLAN NOTE
- Still in pre-contemplative stage  Continues to smoke a pack of cigarette daily  - Will continue to address during subsequent visit

## 2022-05-20 NOTE — PROGRESS NOTES
Assessment/Plan:    Recurrent major depressive disorder [facility])  Patient with a history of depression and anxiety  PHQ 9 score of 15, consistent with moderate/severe depression and GAD7 score of 13 consistent with moderate anxiety  - Patient currently on Zolof 50mg daily without improvement to depressive symptoms  -  Will increase dose of Zolof  to 75mg and add Buspar 7 5mg BID   - Patient started seeing a therapist last week  Encouraged to continue to follow up weekly  - will follow up in one month  Tobacco dependence  - Still in pre-contemplative stage  Continues to smoke a pack of cigarette daily  - Will continue to address during subsequent visit  Elevated blood pressure reading  - BP today: 148/92  Repeat same    - Currently asymptomatic  Denies chest pain, palpitation, dizziness, blurred vision or headaches  - Pt states she took [employer] energy drink about an hour prior to visit  Could be contributory to elevated pressure and HR   - Will follow up during next visit  Diagnoses and all orders for this visit:    Moderate episode of recurrent major depressive disorder (HCC)  -     busPIRone (BUSPAR) 7 5 mg tablet; Take 1 tablet (7 5 mg total) by mouth in the morning and 1 tablet (7 5 mg total) in the evening  Depression, unspecified depression type  -     sertraline (Zoloft) 50 mg tablet; Take 1 5 tablets (75 mg total) by mouth in the morning  Tobacco dependence    Elevated blood pressure reading        Subjective:      Patient ID: Ángel Hancock is a 28 y o  female  Patient presents to the clinic for follow up on her depression  Was started on Zolof  And is requesting increasing dose  She still endorses to feeling anxious and is having troubles sleeping  She started therapy last week, mostly filled out paper work, but plans to continue weekly session  She continues to provide care for her daughter, who has multiple appointments  She denies SI/HI       The following portions of the patient's history were reviewed and updated as appropriate: allergies, current medications, past family history, past medical history, past social history, past surgical history and problem list     Review of Systems   Constitutional: Negative for activity change, appetite change, chills, fatigue and fever  Respiratory: Negative for cough, chest tightness and shortness of breath  Cardiovascular: Negative for chest pain and palpitations  Gastrointestinal: Negative  Neurological: Negative for dizziness and headaches  Psychiatric/Behavioral: Positive for dysphoric mood and sleep disturbance  Negative for agitation, behavioral problems, confusion, decreased concentration, hallucinations, self-injury and suicidal ideas  The patient is nervous/anxious  The patient is not hyperactive  Objective:      /100 (BP Location: Left arm, Patient Position: Sitting, Cuff Size: Standard)   Pulse (!) 115   Temp (!) 96 5 °F (35 8 °C) (Temporal)   Resp 18   Ht 5' 4" (1 626 m)   Wt 76 7 kg (169 lb 3 2 oz)   SpO2 98%   BMI 29 04 kg/m²          Physical Exam  Vitals reviewed  Constitutional:       General: She is not in acute distress  Appearance: Normal appearance  She is not ill-appearing, toxic-appearing or diaphoretic  HENT:      Head: Normocephalic and atraumatic  Eyes:      General:         Right eye: No discharge  Left eye: No discharge  Conjunctiva/sclera: Conjunctivae normal    Cardiovascular:      Rate and Rhythm: Normal rate and regular rhythm  Pulses: Normal pulses  Heart sounds: Normal heart sounds  Pulmonary:      Effort: Pulmonary effort is normal  No respiratory distress  Breath sounds: Normal breath sounds  No wheezing  Abdominal:      General: Abdomen is flat  Bowel sounds are normal  There is no distension  Palpations: Abdomen is soft  Tenderness: There is no abdominal tenderness  There is no guarding     Musculoskeletal: General: Normal range of motion  Right lower leg: No edema  Left lower leg: No edema  Skin:     General: Skin is warm and dry  Neurological:      Mental Status: She is alert  Mental status is at baseline  Psychiatric:         Mood and Affect: Mood normal          Behavior: Behavior normal          Thought Content:  Thought content normal          Judgment: Judgment normal

## 2022-05-23 DIAGNOSIS — F33.1 MODERATE EPISODE OF RECURRENT MAJOR DEPRESSIVE DISORDER (HCC): Primary | ICD-10-CM

## 2022-05-23 RX ORDER — SERTRALINE HYDROCHLORIDE 25 MG/1
25 TABLET, FILM COATED ORAL DAILY
Qty: 90 TABLET | Refills: 1 | Status: SHIPPED | OUTPATIENT
Start: 2022-05-23

## 2022-06-17 ENCOUNTER — TELEPHONE (OUTPATIENT)
Dept: FAMILY MEDICINE CLINIC | Facility: CLINIC | Age: 32
End: 2022-06-17

## 2023-01-16 ENCOUNTER — TELEPHONE (OUTPATIENT)
Dept: PSYCHIATRY | Facility: CLINIC | Age: 33
End: 2023-01-16

## 2023-06-05 NOTE — ASSESSMENT & PLAN NOTE
Patient with a history of depression and anxiety  PHQ 9 score of 15, consistent with moderate/severe depression and GAD7 score of 13 consistent with moderate anxiety  - Patient currently on Zolof 50mg daily without improvement to depressive symptoms  -  Will increase dose of Zolof  to 75mg and add Buspar 7 5mg BID   - Patient started seeing a therapist last week  Encouraged to continue to follow up weekly  - will follow up in one month  Detail Level: Detailed

## 2024-02-08 NOTE — ASSESSMENT & PLAN NOTE
Patient's mom called to schedule an appt. Patient was seen @ ER on 01/29 still complaining of throat pain feels like a lump    S/p rhogam

## 2024-07-29 ENCOUNTER — TELEPHONE (OUTPATIENT)
Age: 34
End: 2024-07-29

## 2024-07-29 NOTE — TELEPHONE ENCOUNTER
Pt had an appt sched for today at 10am to have IUD removed by [facility] and was unable to make it in. Pt asked if she can be resched for this week to have it removed per pt she wants to start a family. Informed pt Salma's sched is really far out. Pt asked to see if anyone else can remove it informed her the other providers are out to mid Sept. Pt wants it removed this week asked if she can be called back to further discuss a sooner appt.

## 2024-09-06 NOTE — TELEPHONE ENCOUNTER
Called pharmacy, insurance will not cover Sertraline as ordered, they will cover 2 prescriptions- one for 50mg and one for 25mg  Entered new Rx for sign off if you approve 
Medication approved  Thanks 
Pt was told by [facility] that her insurance doesn't cover the medication sertraline due to it be a high dose   Pt also has question and would like a call back      [address]
No

## 2024-11-13 ENCOUNTER — TELEPHONE (OUTPATIENT)
Age: 34
End: 2024-11-13

## 2024-11-13 NOTE — TELEPHONE ENCOUNTER
Pt set up appt to remove  Mirena IUD. IUD has been  for a few weeks now and pt is concerned about having an  IUD.

## 2025-07-02 ENCOUNTER — HOSPITAL ENCOUNTER (EMERGENCY)
Facility: HOSPITAL | Age: 35
Discharge: HOME | End: 2025-07-02
Attending: EMERGENCY MEDICINE
Payer: MEDICARE

## 2025-07-02 ENCOUNTER — APPOINTMENT (EMERGENCY)
Dept: CT IMAGING | Facility: HOSPITAL | Age: 35
End: 2025-07-02
Payer: MEDICARE

## 2025-07-02 VITALS
SYSTOLIC BLOOD PRESSURE: 123 MMHG | OXYGEN SATURATION: 99 % | HEART RATE: 78 BPM | TEMPERATURE: 99 F | RESPIRATION RATE: 16 BRPM | DIASTOLIC BLOOD PRESSURE: 64 MMHG

## 2025-07-02 DIAGNOSIS — F10.90 ALCOHOL USE: ICD-10-CM

## 2025-07-02 DIAGNOSIS — R10.13 EPIGASTRIC ABDOMINAL PAIN: Primary | ICD-10-CM

## 2025-07-02 LAB
ALBUMIN SERPL BCG-MCNC: 4.4 G/DL (ref 3.5–5)
ALP SERPL-CCNC: 112 U/L (ref 34–104)
ALT SERPL W P-5'-P-CCNC: 17 U/L (ref 7–52)
ANION GAP SERPL CALCULATED.3IONS-SCNC: 10 MMOL/L (ref 4–13)
AST SERPL W P-5'-P-CCNC: 29 U/L (ref 13–39)
BASOPHILS # BLD AUTO: 0.06 THOUSANDS/ÂΜL (ref 0–0.1)
BASOPHILS NFR BLD AUTO: 1 % (ref 0–1)
BILIRUB SERPL-MCNC: 0.52 MG/DL (ref 0.2–1)
BUN SERPL-MCNC: 12 MG/DL (ref 5–25)
CALCIUM SERPL-MCNC: 9 MG/DL (ref 8.4–10.2)
CARDIAC TROPONIN I PNL SERPL HS: <2 NG/L (ref ?–50)
CHLORIDE SERPL-SCNC: 103 MMOL/L (ref 96–108)
CO2 SERPL-SCNC: 26 MMOL/L (ref 21–32)
CREAT SERPL-MCNC: 1.13 MG/DL (ref 0.6–1.3)
EOSINOPHIL # BLD AUTO: 0.05 THOUSAND/ÂΜL (ref 0–0.61)
EOSINOPHIL NFR BLD AUTO: 1 % (ref 0–6)
ERYTHROCYTE [DISTWIDTH] IN BLOOD BY AUTOMATED COUNT: 17.6 % (ref 11.6–15.1)
GFR SERPL CREATININE-BSD FRML MDRD: 63 ML/MIN/1.73SQ M
GLUCOSE SERPL-MCNC: 105 MG/DL (ref 65–140)
HCG SERPL QL: NEGATIVE
HCT VFR BLD AUTO: 37.9 % (ref 34.8–46.1)
HGB BLD-MCNC: 12.5 G/DL (ref 11.5–15.4)
IMM GRANULOCYTES # BLD AUTO: 0.03 THOUSAND/UL (ref 0–0.2)
IMM GRANULOCYTES NFR BLD AUTO: 0 % (ref 0–2)
LIPASE SERPL-CCNC: 54 U/L (ref 11–82)
LYMPHOCYTES # BLD AUTO: 3.2 THOUSANDS/ÂΜL (ref 0.6–4.47)
LYMPHOCYTES NFR BLD AUTO: 32 % (ref 14–44)
MCH RBC QN AUTO: 27.3 PG (ref 26.8–34.3)
MCHC RBC AUTO-ENTMCNC: 33 G/DL (ref 31.4–37.4)
MCV RBC AUTO: 83 FL (ref 82–98)
MONOCYTES # BLD AUTO: 0.81 THOUSAND/ÂΜL (ref 0.17–1.22)
MONOCYTES NFR BLD AUTO: 8 % (ref 4–12)
NEUTROPHILS # BLD AUTO: 5.72 THOUSANDS/ÂΜL (ref 1.85–7.62)
NEUTS SEG NFR BLD AUTO: 58 % (ref 43–75)
NRBC BLD AUTO-RTO: 0 /100 WBCS
PLATELET # BLD AUTO: 311 THOUSANDS/UL (ref 149–390)
PMV BLD AUTO: 8.8 FL (ref 8.9–12.7)
POTASSIUM SERPL-SCNC: 4.1 MMOL/L (ref 3.5–5.3)
PROT SERPL-MCNC: 7.6 G/DL (ref 6.4–8.4)
RBC # BLD AUTO: 4.58 MILLION/UL (ref 3.81–5.12)
SODIUM SERPL-SCNC: 139 MMOL/L (ref 135–147)
WBC # BLD AUTO: 9.87 THOUSAND/UL (ref 4.31–10.16)

## 2025-07-02 PROCEDURE — 96375 TX/PRO/DX INJ NEW DRUG ADDON: CPT

## 2025-07-02 PROCEDURE — 99284 EMERGENCY DEPT VISIT MOD MDM: CPT

## 2025-07-02 PROCEDURE — 96376 TX/PRO/DX INJ SAME DRUG ADON: CPT

## 2025-07-02 PROCEDURE — 74177 CT ABD & PELVIS W/CONTRAST: CPT

## 2025-07-02 PROCEDURE — 85025 COMPLETE CBC W/AUTO DIFF WBC: CPT | Performed by: EMERGENCY MEDICINE

## 2025-07-02 PROCEDURE — 84703 CHORIONIC GONADOTROPIN ASSAY: CPT | Performed by: EMERGENCY MEDICINE

## 2025-07-02 PROCEDURE — 96374 THER/PROPH/DIAG INJ IV PUSH: CPT

## 2025-07-02 PROCEDURE — 84484 ASSAY OF TROPONIN QUANT: CPT | Performed by: EMERGENCY MEDICINE

## 2025-07-02 PROCEDURE — 93005 ELECTROCARDIOGRAM TRACING: CPT

## 2025-07-02 PROCEDURE — 80053 COMPREHEN METABOLIC PANEL: CPT | Performed by: EMERGENCY MEDICINE

## 2025-07-02 PROCEDURE — 83690 ASSAY OF LIPASE: CPT | Performed by: EMERGENCY MEDICINE

## 2025-07-02 PROCEDURE — 96361 HYDRATE IV INFUSION ADD-ON: CPT

## 2025-07-02 PROCEDURE — 99285 EMERGENCY DEPT VISIT HI MDM: CPT | Performed by: EMERGENCY MEDICINE

## 2025-07-02 PROCEDURE — 36415 COLL VENOUS BLD VENIPUNCTURE: CPT | Performed by: EMERGENCY MEDICINE

## 2025-07-02 RX ORDER — FAMOTIDINE 10 MG/ML
20 INJECTION, SOLUTION INTRAVENOUS ONCE
Status: COMPLETED | OUTPATIENT
Start: 2025-07-02 | End: 2025-07-02

## 2025-07-02 RX ORDER — SUCRALFATE 1 G/1
1 TABLET ORAL ONCE
Status: COMPLETED | OUTPATIENT
Start: 2025-07-02 | End: 2025-07-02

## 2025-07-02 RX ORDER — ONDANSETRON 2 MG/ML
4 INJECTION INTRAMUSCULAR; INTRAVENOUS ONCE
Status: COMPLETED | OUTPATIENT
Start: 2025-07-02 | End: 2025-07-02

## 2025-07-02 RX ORDER — ACETAMINOPHEN 10 MG/ML
1000 INJECTION, SOLUTION INTRAVENOUS ONCE
Status: COMPLETED | OUTPATIENT
Start: 2025-07-02 | End: 2025-07-02

## 2025-07-02 RX ADMIN — ONDANSETRON 4 MG: 2 INJECTION INTRAMUSCULAR; INTRAVENOUS at 19:15

## 2025-07-02 RX ADMIN — IOHEXOL 50 ML: 240 INJECTION, SOLUTION INTRATHECAL; INTRAVASCULAR; INTRAVENOUS; ORAL at 21:10

## 2025-07-02 RX ADMIN — SODIUM CHLORIDE 1000 ML: 0.9 INJECTION, SOLUTION INTRAVENOUS at 19:38

## 2025-07-02 RX ADMIN — ACETAMINOPHEN 1000 MG: 10 INJECTION, SOLUTION INTRAVENOUS at 21:04

## 2025-07-02 RX ADMIN — IOHEXOL 100 ML: 350 INJECTION, SOLUTION INTRAVENOUS at 21:10

## 2025-07-02 RX ADMIN — SUCRALFATE 1 G: 1 TABLET ORAL at 19:14

## 2025-07-02 RX ADMIN — ONDANSETRON 4 MG: 2 INJECTION INTRAMUSCULAR; INTRAVENOUS at 22:06

## 2025-07-02 RX ADMIN — FAMOTIDINE 20 MG: 10 INJECTION, SOLUTION INTRAVENOUS at 19:19

## 2025-07-02 NOTE — ED PROVIDER NOTES
Time reflects when diagnosis was documented in both MDM as applicable and the Disposition within this note       Time User Action Codes Description Comment    7/2/2025  7:53 PM Beth Tenorio Add [R10.13] Epigastric abdominal pain     7/2/2025  7:53 PM Beth Tenorio Add [F10.90] Alcohol use           ED Disposition       None          Assessment & Plan       Medical Decision Making  35-year-old female presenting for evaluation of epigastric abdominal pain, vomiting.  Differential diagnoses include not limited to pancreatitis, gastritis, PUD, cholecystitis, symptomatic cholelithiasis, colitis, obstruction, perforation, atypical ACS. Does not wish to pursue rehab or detox for her alcohol use.  Labs overall unremarkable. Signed out pending CT.     Problems Addressed:  Alcohol use: acute illness or injury  Epigastric abdominal pain: acute illness or injury    Amount and/or Complexity of Data Reviewed  Labs: ordered. Decision-making details documented in ED Course.  Radiology: ordered.  ECG/medicine tests: ordered and independent interpretation performed. Decision-making details documented in ED Course.    Risk  Prescription drug management.        ED Course as of 07/02/25 2007 Wed Jul 02, 2025 1917 Procedure Note: EKG  Date/Time: 07/02/25 7:14 PM   Interpreted by: Beth Tenorio  Indications / Diagnosis: abdominal pain  ECG reviewed by me, the ED Provider: yes   The EKG demonstrates:  Rhythm: rate 91, normal sinus  Intervals: normal intervals  Axis: normal axis  QRS/Blocks: normal QRS  ST Changes: No acute ST Changes, no STD/HUMERA.    1937 hs TnI 0hr: <2   1937 PREGNANCY, SERUM: Negative       Medications   sodium chloride 0.9 % bolus 1,000 mL (1,000 mL Intravenous New Bag 7/2/25 1938)   ondansetron (ZOFRAN) injection 4 mg (4 mg Intravenous Given 7/2/25 1915)   sucralfate (CARAFATE) tablet 1 g (1 g Oral Given 7/2/25 1914)   Famotidine (PF) (PEPCID) injection 20 mg (20 mg Intravenous Given 7/2/25 1919)       ED Risk  Strat Scores                    No data recorded                            History of Present Illness       Chief Complaint   Patient presents with    Abdominal Pain     C/o of upper mid abd pain that started last night worsening today. +vomiting. Pt states drinking everyday about 4-5 shots a day. Normal BM.        Past Medical History[1]   Past Surgical History[2]   Family History[3]   Social History[4]   E-Cigarette/Vaping    E-Cigarette Use Current Every Day User       E-Cigarette/Vaping Substances    Nicotine No     THC Yes     CBD No     Flavoring No     Other No     Unknown No       I have reviewed and agree with the history as documented.     35-year-old female with history of alcohol abuse, anemia, GERD who presents for evaluation of abdominal pain.  Patient reports onset of symptoms yesterday.  She has had epigastric abdominal pain that is otherwise nonradiating.  She has had 1 episode of nonbloody, nonbilious vomiting.  She has had some intermittent nausea as well.  No diarrhea, constipation, urinary symptoms, fevers.  She has not tried any medications for her symptoms.  She is a chronic daily drinker and drinks approximately 4 shots of vodka daily.  This has not changed over the past several weeks.  She does not wish to have any help with her drinking and declines rehab or other resources at this time.        Review of Systems   Constitutional:  Negative for chills and fever.   Respiratory:  Negative for shortness of breath.    Cardiovascular:  Negative for chest pain.   Gastrointestinal:  Positive for abdominal pain, nausea and vomiting. Negative for constipation and diarrhea.   Genitourinary:  Negative for dysuria, flank pain and frequency.   Musculoskeletal:  Negative for gait problem.   Skin:  Negative for rash.   Neurological:  Negative for weakness and light-headedness.   All other systems reviewed and are negative.          Objective       ED Triage Vitals [07/02/25 1853]   Temperature Pulse Blood  Pressure Respirations SpO2 Patient Position - Orthostatic VS   99 °F (37.2 °C) 97 137/89 18 100 % Lying      Temp Source Heart Rate Source BP Location FiO2 (%) Pain Score    Oral Monitor Right arm -- 6      Vitals      Date and Time Temp Pulse SpO2 Resp BP Pain Score FACES Pain Rating User   07/02/25 1853 99 °F (37.2 °C) 97 100 % 18 137/89 6 -- AF            Physical Exam  Vitals and nursing note reviewed.   Constitutional:       General: She is not in acute distress.     Appearance: She is well-developed. She is not ill-appearing.   HENT:      Head: Normocephalic and atraumatic.      Nose: Nose normal.      Mouth/Throat:      Mouth: Mucous membranes are moist.     Eyes:      Conjunctiva/sclera: Conjunctivae normal.       Cardiovascular:      Rate and Rhythm: Normal rate and regular rhythm.      Heart sounds: No murmur heard.     No friction rub. No gallop.   Pulmonary:      Effort: Pulmonary effort is normal.      Breath sounds: Normal breath sounds. No wheezing, rhonchi or rales.   Abdominal:      General: Bowel sounds are normal. There is no distension.      Palpations: Abdomen is soft.      Tenderness: There is abdominal tenderness in the epigastric area. There is no guarding or rebound.     Musculoskeletal:         General: No swelling or tenderness. Normal range of motion.      Cervical back: Normal range of motion and neck supple.     Skin:     General: Skin is warm and dry.      Coloration: Skin is not pale.      Findings: No rash.     Neurological:      General: No focal deficit present.      Mental Status: She is alert and oriented to person, place, and time.     Psychiatric:         Behavior: Behavior normal.         Results Reviewed       Procedure Component Value Units Date/Time    HS Troponin 0hr (reflex protocol) [[id number]]  (Normal) Collected: 07/02/25 1907    Lab Status: Final result Specimen: Blood from Arm, Right Updated: 07/02/25 1936     hs TnI 0hr <2 ng/L     hCG, qualitative pregnancy  [[id number]]  (Normal) Collected: 07/02/25 1907    Lab Status: Final result Specimen: Blood from Arm, Right Updated: 07/02/25 1936     Preg, Serum Negative    Comprehensive metabolic panel [[id number]]  (Abnormal) Collected: 07/02/25 1907    Lab Status: Final result Specimen: Blood from Arm, Right Updated: 07/02/25 1929     Sodium 139 mmol/L      Potassium 4.1 mmol/L      Chloride 103 mmol/L      CO2 26 mmol/L      ANION GAP 10 mmol/L      BUN 12 mg/dL      Creatinine 1.13 mg/dL      Glucose 105 mg/dL      Calcium 9.0 mg/dL      AST 29 U/L      ALT 17 U/L      Alkaline Phosphatase 112 U/L      Total Protein 7.6 g/dL      Albumin 4.4 g/dL      Total Bilirubin 0.52 mg/dL      eGFR 63 ml/min/1.73sq m     Narrative:      National Kidney Disease Foundation guidelines for Chronic Kidney Disease (CKD):     Stage 1 with normal or high GFR (GFR > 90 mL/min/1.73 square meters)    Stage 2 Mild CKD (GFR = 60-89 mL/min/1.73 square meters)    Stage 3A Moderate CKD (GFR = 45-59 mL/min/1.73 square meters)    Stage 3B Moderate CKD (GFR = 30-44 mL/min/1.73 square meters)    Stage 4 Severe CKD (GFR = 15-29 mL/min/1.73 square meters)    Stage 5 End Stage CKD (GFR <15 mL/min/1.73 square meters)  Note: GFR calculation is accurate only with a steady state creatinine    Lipase [[id number]]  (Normal) Collected: 07/02/25 1907    Lab Status: Final result Specimen: Blood from Arm, Right Updated: 07/02/25 1929     Lipase 54 u/L     CBC and differential [[id number]]  (Abnormal) Collected: 07/02/25 1907    Lab Status: Final result Specimen: Blood from Arm, Right Updated: 07/02/25 1913     WBC 9.87 Thousand/uL      RBC 4.58 Million/uL      Hemoglobin 12.5 g/dL      Hematocrit 37.9 %      MCV 83 fL      MCH 27.3 pg      MCHC 33.0 g/dL      RDW 17.6 %      MPV 8.8 fL      Platelets 311 Thousands/uL      nRBC 0 /100 WBCs      Segmented % 58 %      Immature Grans % 0 %      Lymphocytes % 32 %      Monocytes % 8 %      Eosinophils Relative 1 %       Basophils Relative 1 %      Absolute Neutrophils 5.72 Thousands/µL      Absolute Immature Grans 0.03 Thousand/uL      Absolute Lymphocytes 3.20 Thousands/µL      Absolute Monocytes 0.81 Thousand/µL      Eosinophils Absolute 0.05 Thousand/µL      Basophils Absolute 0.06 Thousands/µL             CT abdomen pelvis with contrast    (Results Pending)       Procedures    ED Medication and Procedure Management   Prior to Admission Medications   Prescriptions Last Dose Informant Patient Reported? Taking?   Docusate Sodium (DOK) 100 MG capsule  Self No No   Sig: Take 1 tablet (100 mg total) by mouth 2 (two) times a day as needed for constipation   Multiple Vitamin (Daily Vites) tablet   No No   Sig: TAKE 1 TABLET BY MOUTH EVERY DAY   Multiple Vitamins-Minerals (Womens Multivitamin) TABS  Self No No   Sig: Take 1 tablet by mouth daily   acetaminophen (TYLENOL) 325 mg tablet  Self No No   Sig: Take 2 tablets (650 mg total) by mouth every 4 (four) hours as needed for mild pain or headaches   busPIRone (BUSPAR) 7.5 mg tablet   No No   Sig: Take 1 tablet (7.5 mg total) by mouth in the morning and 1 tablet (7.5 mg total) in the evening.   chlorhexidine (PERIDEX) 0.12 % solution  Self Yes No   Sig: PLEASE SEE ATTACHED FOR DETAILED DIRECTIONS   hydrOXYzine HCL (ATARAX) 25 mg tablet  Self No No   Sig: Take 1 tablet (25 mg total) by mouth every 6 (six) hours as needed for anxiety   levonorgestrel (MIRENA) 20 MCG/24HR IUD  Self Yes No   Si each by Intrauterine route   oxyCODONE (ROXICODONE) 5 mg immediate release tablet  Self Yes No   Sig: TAKE 1 TABLET BY MOUTH EVERY 6 HOURS AS NEEDED FOR MODERATE PAIN. MAX DAILY AMOUNT  20 MG   sertraline (Zoloft) 25 mg tablet   No No   Sig: Take 1 tablet (25 mg total) by mouth in the morning.   sertraline (Zoloft) 50 mg tablet   No No   Sig: Take 1 tablet (50 mg total) by mouth in the morning.      Facility-Administered Medications: None     Patient's Medications   Discharge Prescriptions    No  medications on file     No discharge procedures on file.  ED SEPSIS DOCUMENTATION   Time reflects when diagnosis was documented in both MDM as applicable and the Disposition within this note       Time User Action Codes Description Comment    2025  7:53 PM Beth Tenorio Add [R10.13] Epigastric abdominal pain     2025  7:53 PM Beth Tenorio Add [F10.90] Alcohol use                    [1]   Past Medical History:  Diagnosis Date    Abnormal Pap smear of cervix     ASCUS , ASCUS cannot exclude HGIL     Anemia     Depression     hx of depression     GERD (gastroesophageal reflux disease)     resolved per pt     History of removal of ovarian cyst     Increased BMI     Shingles     with pregnancy    Subcutaneous cyst     Varicella     had chicken pox as child/shingles with pregnancy   [2]   Past Surgical History:  Procedure Laterality Date    APPENDECTOMY      BARIATRIC SURGERY      GASTRIC BYPASS          OTHER SURGICAL HISTORY  2021    bilat breast reduction and lift    OVARIAN CYST REMOVAL         [3]   Family History  Problem Relation Name Age of Onset    No Known Problems Mother      No Known Problems Father      No Known Problems Sister      No Known Problems Brother      Cancer Maternal Grandmother          Lung    Heart failure Maternal Grandfather      No Known Problems Paternal Grandmother      No Known Problems Paternal Grandfather     [4]   Social History  Tobacco Use    Smoking status: Some Days     Current packs/day: 0.00     Average packs/day: 0.5 packs/day for 9.0 years (4.5 ttl pk-yrs)     Types: Cigarettes     Start date: 3/19/2010     Last attempt to quit: 3/19/2019     Years since quittin.2    Smokeless tobacco: Never    Tobacco comments:     started to smoke again here and there    Vaping Use    Vaping status: Every Day    Substances: THC   Substance Use Topics    Alcohol use: Yes     Comment: occ    Drug use: No        Beth Tenorio MD  25

## 2025-07-03 LAB
ATRIAL RATE: 91 BPM
P AXIS: 74 DEGREES
PR INTERVAL: 150 MS
QRS AXIS: 27 DEGREES
QRSD INTERVAL: 84 MS
QT INTERVAL: 374 MS
QTC INTERVAL: 460 MS
T WAVE AXIS: 62 DEGREES
VENTRICULAR RATE: 91 BPM

## 2025-07-03 PROCEDURE — 93010 ELECTROCARDIOGRAM REPORT: CPT | Performed by: INTERNAL MEDICINE

## 2025-07-03 NOTE — ED CARE HANDOFF
Emergency Department Sign Out Note          ED Course as of 07/02/25 2221 Wed Jul 02, 2025   2157 Pt went for CT. She did complain of increased pain and was given IV tylenol for pain. She states her pain is better, but now has some nausea. Will be given zofran. Advised of a significant delay in CT reads today.    2219 The patient states she feels much better and does not want to stay for results.     The patient has been advised of the risks, in layman terms, of leaving AMA which include, but are not limited to death, coma, permanent disability, loss of current lifestyle, delay in diagnosis.    Alternatives have been offered - the patient remains steadfast in their wish to leave.    The patient has been advised that should they change their mind they are welcome to return to this hospital, or any other, at any time.    The patient understands that in no way does an AMA discharge mean that I do not want them to have the best medical care available. To this end, I have provided appropriate prescriptions if needed, referrals, and discharge instructions.         Procedures  Medical Decision Making  Amount and/or Complexity of Data Reviewed  Labs: ordered.  Radiology: ordered.    Risk  Prescription drug management.            Disposition  Final diagnoses:   Epigastric abdominal pain   Alcohol use     Time reflects when diagnosis was documented in both MDM as applicable and the Disposition within this note       Time User Action Codes Description Comment    7/2/2025  7:53 PM Beth Tenorio Add [R10.13] Epigastric abdominal pain     7/2/2025  7:53 PM Beth Tenorio Add [F10.90] Alcohol use           ED Disposition       ED Disposition   AMA    Condition   --    Date/Time   Wed Jul 2, 2025 10:16 PM    Comment   Date: 7/2/2025  Patient: Shayla Davies  Admitted: 7/2/2025  6:46 PM  Attending Provider: Bj Woo,     Shayla MALDONADO Samson or her authorized caregiver has made the decision for the patient to leave the  emergency department against the ad vice of her attending physician. She or her authorized caregiver has been informed and understands the inherent risks, including death.  She or her authorized caregiver has decided to accept the responsibility for this decision. Shayla Davies cyndi king all necessary parties have been advised that she may return for further evaluation or treatment. Her condition at time of discharge was fair.  Shayla Davies had current vital signs as follows:  /64   Pulse 78   Temp 99 °F (37.2 °C) (Or al)   Resp 16   LMP 06/30/2025 (Exact Date)                Follow-up Information       Follow up With Specialties Details Why Contact Info Additional Information    Diana Evans MD Family Medicine   [address] [place] [id number] [phone]       [facility] Emergency Medicine  If symptoms worsen [address] [address]  [phone] [facility], [address] [address]          Patient's Medications   Discharge Prescriptions    No medications on file     No discharge procedures on file.       ED Provider  Electronically Signed by     Bj Woo DO  07/02/25 9442

## 2025-07-03 NOTE — ED NOTES
Discharge reviewed with patient; pt verbalized understanding and has no further questions at this time. Pt is ambulatory off unit with steady gait.      Anu Ellington RN  07/02/25 0312

## 2025-07-05 ENCOUNTER — HOSPITAL ENCOUNTER (EMERGENCY)
Facility: HOSPITAL | Age: 35
Discharge: HOME | End: 2025-07-05
Attending: INTERNAL MEDICINE | Admitting: INTERNAL MEDICINE
Payer: MEDICARE

## 2025-07-05 VITALS
DIASTOLIC BLOOD PRESSURE: 77 MMHG | TEMPERATURE: 97.9 F | RESPIRATION RATE: 16 BRPM | SYSTOLIC BLOOD PRESSURE: 126 MMHG | HEART RATE: 104 BPM | OXYGEN SATURATION: 98 %

## 2025-07-05 DIAGNOSIS — F10.10 ALCOHOL ABUSE: ICD-10-CM

## 2025-07-05 DIAGNOSIS — R11.2 NAUSEA AND VOMITING, UNSPECIFIED VOMITING TYPE: Primary | ICD-10-CM

## 2025-07-05 LAB
ALBUMIN SERPL BCG-MCNC: 4.1 G/DL (ref 3.5–5)
ALP SERPL-CCNC: 126 U/L (ref 34–104)
ALT SERPL W P-5'-P-CCNC: 18 U/L (ref 7–52)
ANION GAP SERPL CALCULATED.3IONS-SCNC: 13 MMOL/L (ref 4–13)
AST SERPL W P-5'-P-CCNC: 40 U/L (ref 13–39)
BASOPHILS # BLD AUTO: 0.07 THOUSANDS/ÂΜL (ref 0–0.1)
BASOPHILS NFR BLD AUTO: 1 % (ref 0–1)
BILIRUB SERPL-MCNC: 0.56 MG/DL (ref 0.2–1)
BUN SERPL-MCNC: 13 MG/DL (ref 5–25)
CALCIUM SERPL-MCNC: 8.7 MG/DL (ref 8.4–10.2)
CHLORIDE SERPL-SCNC: 105 MMOL/L (ref 96–108)
CO2 SERPL-SCNC: 21 MMOL/L (ref 21–32)
CREAT SERPL-MCNC: 1.13 MG/DL (ref 0.6–1.3)
EOSINOPHIL # BLD AUTO: 0.07 THOUSAND/ÂΜL (ref 0–0.61)
EOSINOPHIL NFR BLD AUTO: 1 % (ref 0–6)
ERYTHROCYTE [DISTWIDTH] IN BLOOD BY AUTOMATED COUNT: 17.9 % (ref 11.6–15.1)
GFR SERPL CREATININE-BSD FRML MDRD: 63 ML/MIN/1.73SQ M
GLUCOSE SERPL-MCNC: 159 MG/DL (ref 65–140)
HCT VFR BLD AUTO: 38.5 % (ref 34.8–46.1)
HGB BLD-MCNC: 12.7 G/DL (ref 11.5–15.4)
IMM GRANULOCYTES # BLD AUTO: 0.01 THOUSAND/UL (ref 0–0.2)
IMM GRANULOCYTES NFR BLD AUTO: 0 % (ref 0–2)
LYMPHOCYTES # BLD AUTO: 3.74 THOUSANDS/ÂΜL (ref 0.6–4.47)
LYMPHOCYTES NFR BLD AUTO: 44 % (ref 14–44)
MCH RBC QN AUTO: 27.4 PG (ref 26.8–34.3)
MCHC RBC AUTO-ENTMCNC: 33 G/DL (ref 31.4–37.4)
MCV RBC AUTO: 83 FL (ref 82–98)
MONOCYTES # BLD AUTO: 0.63 THOUSAND/ÂΜL (ref 0.17–1.22)
MONOCYTES NFR BLD AUTO: 7 % (ref 4–12)
NEUTROPHILS # BLD AUTO: 3.94 THOUSANDS/ÂΜL (ref 1.85–7.62)
NEUTS SEG NFR BLD AUTO: 47 % (ref 43–75)
NRBC BLD AUTO-RTO: 0 /100 WBCS
PLATELET # BLD AUTO: 313 THOUSANDS/UL (ref 149–390)
PMV BLD AUTO: 8.9 FL (ref 8.9–12.7)
POTASSIUM SERPL-SCNC: 3.5 MMOL/L (ref 3.5–5.3)
PROT SERPL-MCNC: 7.3 G/DL (ref 6.4–8.4)
RBC # BLD AUTO: 4.64 MILLION/UL (ref 3.81–5.12)
SODIUM SERPL-SCNC: 139 MMOL/L (ref 135–147)
WBC # BLD AUTO: 8.46 THOUSAND/UL (ref 4.31–10.16)

## 2025-07-05 PROCEDURE — 80053 COMPREHEN METABOLIC PANEL: CPT | Performed by: INTERNAL MEDICINE

## 2025-07-05 PROCEDURE — 36415 COLL VENOUS BLD VENIPUNCTURE: CPT | Performed by: INTERNAL MEDICINE

## 2025-07-05 PROCEDURE — 85025 COMPLETE CBC W/AUTO DIFF WBC: CPT | Performed by: INTERNAL MEDICINE

## 2025-07-05 PROCEDURE — 96365 THER/PROPH/DIAG IV INF INIT: CPT

## 2025-07-05 PROCEDURE — 99284 EMERGENCY DEPT VISIT MOD MDM: CPT | Performed by: INTERNAL MEDICINE

## 2025-07-05 PROCEDURE — 96368 THER/DIAG CONCURRENT INF: CPT

## 2025-07-05 PROCEDURE — 99284 EMERGENCY DEPT VISIT MOD MDM: CPT

## 2025-07-05 PROCEDURE — 96375 TX/PRO/DX INJ NEW DRUG ADDON: CPT

## 2025-07-05 RX ORDER — ACETAMINOPHEN 325 MG/1
650 TABLET ORAL ONCE
Status: COMPLETED | OUTPATIENT
Start: 2025-07-05 | End: 2025-07-05

## 2025-07-05 RX ORDER — ONDANSETRON 4 MG/1
4 TABLET, FILM COATED ORAL EVERY 6 HOURS
Qty: 12 TABLET | Refills: 0 | Status: SHIPPED | OUTPATIENT
Start: 2025-07-05

## 2025-07-05 RX ORDER — PANTOPRAZOLE SODIUM 40 MG/10ML
40 INJECTION, POWDER, LYOPHILIZED, FOR SOLUTION INTRAVENOUS ONCE
Status: COMPLETED | OUTPATIENT
Start: 2025-07-05 | End: 2025-07-05

## 2025-07-05 RX ORDER — ONDANSETRON 2 MG/ML
4 INJECTION INTRAMUSCULAR; INTRAVENOUS ONCE
Status: COMPLETED | OUTPATIENT
Start: 2025-07-05 | End: 2025-07-05

## 2025-07-05 RX ORDER — PANTOPRAZOLE SODIUM 20 MG/1
40 TABLET, DELAYED RELEASE ORAL DAILY
Qty: 20 TABLET | Refills: 0 | Status: SHIPPED | OUTPATIENT
Start: 2025-07-05

## 2025-07-05 RX ADMIN — THIAMINE HYDROCHLORIDE 100 MG: 100 INJECTION, SOLUTION INTRAMUSCULAR; INTRAVENOUS at 16:41

## 2025-07-05 RX ADMIN — ACETAMINOPHEN 650 MG: 325 TABLET, FILM COATED ORAL at 16:54

## 2025-07-05 RX ADMIN — PANTOPRAZOLE SODIUM 40 MG: 40 INJECTION, POWDER, FOR SOLUTION INTRAVENOUS at 16:23

## 2025-07-05 RX ADMIN — ONDANSETRON 4 MG: 2 INJECTION INTRAMUSCULAR; INTRAVENOUS at 16:17

## 2025-07-05 RX ADMIN — DEXTROSE AND SODIUM CHLORIDE 1000 ML: 5; .9 INJECTION, SOLUTION INTRAVENOUS at 16:29

## 2025-07-05 NOTE — DISCHARGE INSTRUCTIONS
Follow-up with your PMD.  Take medication as prescribed.  Try to decrease alcohol or go to alcohol detox.  Labs Reviewed   CBC AND DIFFERENTIAL - Abnormal       Result Value Ref Range Status    WBC 8.46  4.31 - 10.16 Thousand/uL Final    RBC 4.64  3.81 - 5.12 Million/uL Final    Hemoglobin 12.7  11.5 - 15.4 g/dL Final    Hematocrit 38.5  34.8 - 46.1 % Final    MCV 83  82 - 98 fL Final    MCH 27.4  26.8 - 34.3 pg Final    MCHC 33.0  31.4 - 37.4 g/dL Final    RDW 17.9 (*) 11.6 - 15.1 % Final    MPV 8.9  8.9 - 12.7 fL Final    Platelets 313  149 - 390 Thousands/uL Final    nRBC 0  /100 WBCs Final    Segmented % 47  43 - 75 % Final    Immature Grans % 0  0 - 2 % Final    Lymphocytes % 44  14 - 44 % Final    Monocytes % 7  4 - 12 % Final    Eosinophils Relative 1  0 - 6 % Final    Basophils Relative 1  0 - 1 % Final    Absolute Neutrophils 3.94  1.85 - 7.62 Thousands/µL Final    Absolute Immature Grans 0.01  0.00 - 0.20 Thousand/uL Final    Absolute Lymphocytes 3.74  0.60 - 4.47 Thousands/µL Final    Absolute Monocytes 0.63  0.17 - 1.22 Thousand/µL Final    Eosinophils Absolute 0.07  0.00 - 0.61 Thousand/µL Final    Basophils Absolute 0.07  0.00 - 0.10 Thousands/µL Final   COMPREHENSIVE METABOLIC PANEL - Abnormal    Sodium 139  135 - 147 mmol/L Final    Potassium 3.5  3.5 - 5.3 mmol/L Final    Chloride 105  96 - 108 mmol/L Final    CO2 21  21 - 32 mmol/L Final    ANION GAP 13  4 - 13 mmol/L Final    BUN 13  5 - 25 mg/dL Final    Creatinine 1.13  0.60 - 1.30 mg/dL Final    Comment: Standardized to IDMS reference method    Glucose 159 (*) 65 - 140 mg/dL Final    Comment: If the patient is fasting, the ADA then defines impaired fasting glucose as > 100 mg/dL and diabetes as > or equal to 123 mg/dL.    Calcium 8.7  8.4 - 10.2 mg/dL Final    AST 40 (*) 13 - 39 U/L Final    ALT 18  7 - 52 U/L Final    Comment: Specimen collection should occur prior to Sulfasalazine administration due to the potential for falsely depressed  results.     Alkaline Phosphatase 126 (*) 34 - 104 U/L Final    Total Protein 7.3  6.4 - 8.4 g/dL Final    Albumin 4.1  3.5 - 5.0 g/dL Final    Total Bilirubin 0.56  0.20 - 1.00 mg/dL Final    Comment: Use of this assay is not recommended for patients undergoing treatment with eltrombopag due to the potential for falsely elevated results.  N-acetyl-p-benzoquinone imine (metabolite of Acetaminophen) will generate erroneously low results in samples for patients that have taken an overdose of Acetaminophen.    eGFR 63  ml/min/1.73sq m Final    Narrative:     National Kidney Disease Foundation guidelines for Chronic Kidney Disease (CKD):     Stage 1 with normal or high GFR (GFR > 90 mL/min/1.73 square meters)    Stage 2 Mild CKD (GFR = 60-89 mL/min/1.73 square meters)    Stage 3A Moderate CKD (GFR = 45-59 mL/min/1.73 square meters)    Stage 3B Moderate CKD (GFR = 30-44 mL/min/1.73 square meters)    Stage 4 Severe CKD (GFR = 15-29 mL/min/1.73 square meters)    Stage 5 End Stage CKD (GFR <15 mL/min/1.73 square meters)  Note: GFR calculation is accurate only with a steady state creatinine

## 2025-07-05 NOTE — ED NOTES
Patient found eating a sandwich from partner in the room. Pt asking for pain medication. Provider made aware     Annmarie Couch RN  07/05/25 [id number]       Annmarie Couch RN  07/05/25 [id number]

## 2025-07-05 NOTE — ED PROVIDER NOTES
Time reflects when diagnosis was documented in both MDM as applicable and the Disposition within this note       Time User Action Codes Description Comment    7/5/2025  5:27 PM Ernestina Reeves Add [R11.2] Nausea and vomiting, unspecified vomiting type     7/5/2025  5:27 PM Ernestina Reeves [F10.10] Alcohol abuse           ED Disposition       ED Disposition   Discharge    Condition   Stable    Date/Time   Sat Jul 5, 2025  5:30 PM    Comment   Shayla Davies discharge to home/self care.                   Assessment & Plan       Medical Decision Making  This is a 35 years old came for having abdominal pain and vomiting.  He stated that she vomited multiple times today but she is a drinker.  The patient significant stated that she take 4 shots of vodka every day at least patient has been to alcohol rehab in the past 2 times.  Patient has no fever.  Patient last BM was 2 days ago.  Patient denies any cough.  And chest pain.  Patient has gastric bypass many years ago.  Last BM was 2 days ago and patient attributed this to not physical exam shows no pertinent positive findings.  Reviewing the labs including CBC, CMP are within normal limits.  Patient discharged home on Zofran and Protonix.  Patient told that she needed either to go to alcohol detox or decrease amount of alcohol she drinking every day.    Differential diagnoses include but not limited; gastritis, PUD, pancreatitis, cholecystitis, cannabinoid induced emesis, esophagitis    Amount and/or Complexity of Data Reviewed  Labs: ordered.    Risk  OTC drugs.  Prescription drug management.             Medications   dextrose 5 % and sodium chloride 0.9 % bolus 1,000 mL (0 mL Intravenous Stopped 7/5/25 1750)   ondansetron (ZOFRAN) injection 4 mg (4 mg Intravenous Given 7/5/25 1617)   thiamine (VITAMIN B1) 100 mg in dextrose 5 % 100 mL IVPB (0 mg Intravenous Stopped 7/5/25 1726)   pantoprazole (PROTONIX) injection 40 mg (40 mg Intravenous Given 7/5/25 1623)    acetaminophen (TYLENOL) tablet 650 mg (650 mg Oral Given 7/5/25 9311)       ED Risk Strat Scores                    No data recorded        SBIRT 20yo+      Flowsheet Row Most Recent Value   Initial Alcohol Screen: US AUDIT-C     1. How often do you have a drink containing alcohol? 6 Filed at: 07/05/2025 1602   2. How many drinks containing alcohol do you have on a typical day you are drinking?  4 Filed at: 07/05/2025 1602   3b. FEMALE Any Age, or MALE 65+: How often do you have 4 or more drinks on one occassion? 6 Filed at: 07/05/2025 1602   Audit-C Score 16 Filed at: 07/05/2025 1602   SUSAN: How many times in the past year have you...    Used an illegal drug or used a prescription medication for non-medical reasons? Never Filed at: 07/05/2025 1602                            History of Present Illness       Chief Complaint   Patient presents with    Vomiting     Pt reports being here a few days ago for stomach pains and is still having ongoing symptoms. Reports constipation last BM 2 days ago. Reports excessive alcohol consumption and used 4 shots today for the stomach pain.        Past Medical History[1]   Past Surgical History[2]   Family History[3]   Social History[4]   E-Cigarette/Vaping    E-Cigarette Use Current Every Day User       E-Cigarette/Vaping Substances    Nicotine No     THC Yes     CBD No     Flavoring No     Other No     Unknown No       I have reviewed and agree with the history as documented.     This is a 35 years old.  Came back for having epigastric pain and nausea vomiting.  Patient stated she cannot keep anything on her abdomen.  Patient drinks alcohol every day, and according to her significant she drinks like 4 shots of vodka every day.  Patient has been on alcohol rehab twice in the past.  Patient has history of gastric bypass many years ago.  Last BM 2 days ago.  Patient is stated that she does not eat that when she does move her bowels.  Patient denies CP, SOB, fever.  Patient sitting  in no distress.      History provided by:  Patient   used: No    Vomiting  Severity:  Moderate  Timing:  Constant  Quality:  Unable to specify  Progression:  Unchanged  Recent urination:  Normal  Context: post-tussive    Relieved by:  Nothing  Associated symptoms: no abdominal pain, no arthralgias, no chills, no cough, no fever and no sore throat        Review of Systems   Constitutional:  Negative for chills and fever.   HENT:  Negative for ear pain and sore throat.    Eyes:  Negative for pain and visual disturbance.   Respiratory:  Negative for cough and shortness of breath.    Cardiovascular:  Negative for chest pain and palpitations.   Gastrointestinal:  Positive for vomiting. Negative for abdominal pain.   Genitourinary:  Negative for dysuria and hematuria.   Musculoskeletal:  Negative for arthralgias and back pain.   Skin:  Negative for color change and rash.   Neurological:  Negative for seizures and syncope.   All other systems reviewed and are negative.          Objective       ED Triage Vitals [07/05/25 1559]   Temperature Pulse Blood Pressure Respirations SpO2 Patient Position - Orthostatic VS   97.9 °F (36.6 °C) (!) 120 126/77 16 97 % Lying      Temp Source Heart Rate Source BP Location FiO2 (%) Pain Score    Oral Monitor Left arm -- 9      Vitals      Date and Time Temp Pulse SpO2 Resp BP Pain Score FACES Pain Rating User   07/05/25 1654 -- -- -- -- -- 9 -- MP   07/05/25 1630 -- 104 98 % 16 -- -- --    07/05/25 1559 97.9 °F (36.6 °C) 120 97 % 16 126/77 9 -- MP            Physical Exam  Vitals and nursing note reviewed.   Constitutional:       General: She is not in acute distress.     Appearance: She is well-developed. She is not ill-appearing, toxic-appearing or diaphoretic.   HENT:      Head: Normocephalic and atraumatic.      Right Ear: Ear canal normal.      Left Ear: Ear canal normal.      Nose: Nose normal.      Mouth/Throat:      Mouth: Mucous membranes are moist.       Pharynx: No oropharyngeal exudate or posterior oropharyngeal erythema.     Eyes:      Conjunctiva/sclera: Conjunctivae normal.     Neck:      Vascular: No carotid bruit.     Cardiovascular:      Rate and Rhythm: Normal rate and regular rhythm.      Heart sounds: No murmur heard.     No friction rub. No gallop.   Pulmonary:      Effort: Pulmonary effort is normal. No respiratory distress.      Breath sounds: Normal breath sounds. No wheezing, rhonchi or rales.   Chest:      Chest wall: No tenderness.   Abdominal:      General: There is no distension.      Palpations: Abdomen is soft. There is no mass.      Tenderness: There is no abdominal tenderness. There is no right CVA tenderness, left CVA tenderness, guarding or rebound.      Hernia: No hernia is present.     Musculoskeletal:         General: No swelling, tenderness, deformity or signs of injury.      Cervical back: Normal range of motion and neck supple. No rigidity or tenderness.      Right lower leg: No edema.      Left lower leg: No edema.   Lymphadenopathy:      Cervical: No cervical adenopathy.     Skin:     General: Skin is warm and dry.      Capillary Refill: Capillary refill takes less than 2 seconds.      Coloration: Skin is not jaundiced or pale.      Findings: No bruising, erythema, lesion or rash.     Neurological:      Mental Status: She is alert and oriented to person, place, and time.     Psychiatric:         Mood and Affect: Mood normal.         Results Reviewed       Procedure Component Value Units Date/Time    Comprehensive metabolic panel [[id number]]  (Abnormal) Collected: 07/05/25 1616    Lab Status: Final result Specimen: Blood from Arm, Right Updated: 07/05/25 1640     Sodium 139 mmol/L      Potassium 3.5 mmol/L      Chloride 105 mmol/L      CO2 21 mmol/L      ANION GAP 13 mmol/L      BUN 13 mg/dL      Creatinine 1.13 mg/dL      Glucose 159 mg/dL      Calcium 8.7 mg/dL      AST 40 U/L      ALT 18 U/L      Alkaline Phosphatase 126 U/L       Total Protein 7.3 g/dL      Albumin 4.1 g/dL      Total Bilirubin 0.56 mg/dL      eGFR 63 ml/min/1.73sq m     Narrative:      National Kidney Disease Foundation guidelines for Chronic Kidney Disease (CKD):     Stage 1 with normal or high GFR (GFR > 90 mL/min/1.73 square meters)    Stage 2 Mild CKD (GFR = 60-89 mL/min/1.73 square meters)    Stage 3A Moderate CKD (GFR = 45-59 mL/min/1.73 square meters)    Stage 3B Moderate CKD (GFR = 30-44 mL/min/1.73 square meters)    Stage 4 Severe CKD (GFR = 15-29 mL/min/1.73 square meters)    Stage 5 End Stage CKD (GFR <15 mL/min/1.73 square meters)  Note: GFR calculation is accurate only with a steady state creatinine    CBC and differential [[id number]]  (Abnormal) Collected: 07/05/25 1616    Lab Status: Final result Specimen: Blood from Arm, Right Updated: 07/05/25 1625     WBC 8.46 Thousand/uL      RBC 4.64 Million/uL      Hemoglobin 12.7 g/dL      Hematocrit 38.5 %      MCV 83 fL      MCH 27.4 pg      MCHC 33.0 g/dL      RDW 17.9 %      MPV 8.9 fL      Platelets 313 Thousands/uL      nRBC 0 /100 WBCs      Segmented % 47 %      Immature Grans % 0 %      Lymphocytes % 44 %      Monocytes % 7 %      Eosinophils Relative 1 %      Basophils Relative 1 %      Absolute Neutrophils 3.94 Thousands/µL      Absolute Immature Grans 0.01 Thousand/uL      Absolute Lymphocytes 3.74 Thousands/µL      Absolute Monocytes 0.63 Thousand/µL      Eosinophils Absolute 0.07 Thousand/µL      Basophils Absolute 0.07 Thousands/µL             No orders to display       Procedures    ED Medication and Procedure Management   Prior to Admission Medications   Prescriptions Last Dose Informant Patient Reported? Taking?   Docusate Sodium (DOK) 100 MG capsule  Self No No   Sig: Take 1 tablet (100 mg total) by mouth 2 (two) times a day as needed for constipation   Multiple Vitamin (Daily Vites) tablet   No No   Sig: TAKE 1 TABLET BY MOUTH EVERY DAY   Multiple Vitamins-Minerals (Womens Multivitamin) TABS  Self No  No   Sig: Take 1 tablet by mouth daily   acetaminophen (TYLENOL) 325 mg tablet  Self No No   Sig: Take 2 tablets (650 mg total) by mouth every 4 (four) hours as needed for mild pain or headaches   busPIRone (BUSPAR) 7.5 mg tablet   No No   Sig: Take 1 tablet (7.5 mg total) by mouth in the morning and 1 tablet (7.5 mg total) in the evening.   chlorhexidine (PERIDEX) 0.12 % solution  Self Yes No   Sig: PLEASE SEE ATTACHED FOR DETAILED DIRECTIONS   hydrOXYzine HCL (ATARAX) 25 mg tablet  Self No No   Sig: Take 1 tablet (25 mg total) by mouth every 6 (six) hours as needed for anxiety   levonorgestrel (MIRENA) 20 MCG/24HR IUD  Self Yes No   Si each by Intrauterine route   oxyCODONE (ROXICODONE) 5 mg immediate release tablet  Self Yes No   Sig: TAKE 1 TABLET BY MOUTH EVERY 6 HOURS AS NEEDED FOR MODERATE PAIN. MAX DAILY AMOUNT  20 MG   sertraline (Zoloft) 25 mg tablet   No No   Sig: Take 1 tablet (25 mg total) by mouth in the morning.   sertraline (Zoloft) 50 mg tablet   No No   Sig: Take 1 tablet (50 mg total) by mouth in the morning.      Facility-Administered Medications: None     Discharge Medication List as of 2025  5:30 PM        START taking these medications    Details   ondansetron (ZOFRAN) 4 mg tablet Take 1 tablet (4 mg total) by mouth every 6 (six) hours, Starting Sat 2025, Normal      pantoprazole (PROTONIX) 20 mg tablet Take 2 tablets (40 mg total) by mouth daily, Starting Sat 2025, Normal           CONTINUE these medications which have NOT CHANGED    Details   acetaminophen (TYLENOL) 325 mg tablet Take 2 tablets (650 mg total) by mouth every 4 (four) hours as needed for mild pain or headaches, Starting Tue 10/22/2019, Print      busPIRone (BUSPAR) 7.5 mg tablet Take 1 tablet (7.5 mg total) by mouth in the morning and 1 tablet (7.5 mg total) in the evening., Starting 2022, Normal      chlorhexidine (PERIDEX) 0.12 % solution PLEASE SEE ATTACHED FOR DETAILED DIRECTIONS, Historical Med       Docusate Sodium (DOK) 100 MG capsule Take 1 tablet (100 mg total) by mouth 2 (two) times a day as needed for constipation, Starting Tue 10/22/2019, Print      hydrOXYzine HCL (ATARAX) 25 mg tablet Take 1 tablet (25 mg total) by mouth every 6 (six) hours as needed for anxiety, Starting Mon 4/11/2022, Normal      levonorgestrel (MIRENA) 20 MCG/24HR IUD 1 each by Intrauterine route, Historical Med      Multiple Vitamin (Daily Vites) tablet TAKE 1 TABLET BY MOUTH EVERY DAY, Normal      Multiple Vitamins-Minerals (Womens Multivitamin) TABS Take 1 tablet by mouth daily, Starting Fri 8/6/2021, Normal      oxyCODONE (ROXICODONE) 5 mg immediate release tablet TAKE 1 TABLET BY MOUTH EVERY 6 HOURS AS NEEDED FOR MODERATE PAIN. MAX DAILY AMOUNT  20 MG, Historical Med      !! sertraline (Zoloft) 25 mg tablet Take 1 tablet (25 mg total) by mouth in the morning., Starting Mon 5/23/2022, Normal      !! sertraline (Zoloft) 50 mg tablet Take 1 tablet (50 mg total) by mouth in the morning., Starting Mon 5/23/2022, Normal       !! - Potential duplicate medications found. Please discuss with provider.        No discharge procedures on file.  ED SEPSIS DOCUMENTATION   Time reflects when diagnosis was documented in both MDM as applicable and the Disposition within this note       Time User Action Codes Description Comment    7/5/2025  5:27 PM Ernestina Reeves Add [R11.2] Nausea and vomiting, unspecified vomiting type     7/5/2025  5:27 PM Ernestina Reeves Add [F10.10] Alcohol abuse                      [1]   Past Medical History:  Diagnosis Date    Abnormal Pap smear of cervix     ASCUS 12/16, ASCUS cannot exclude HGIL 2/19    Anemia     Depression     hx of depression     GERD (gastroesophageal reflux disease)     resolved per pt     History of removal of ovarian cyst     Increased BMI     Shingles     with pregnancy    Subcutaneous cyst     Varicella     had chicken pox as child/shingles with pregnancy   [2]   Past Surgical  History:  Procedure Laterality Date    APPENDECTOMY      BARIATRIC SURGERY      GASTRIC BYPASS          OTHER SURGICAL HISTORY  2021    bilat breast reduction and lift    OVARIAN CYST REMOVAL         [3]   Family History  Problem Relation Name Age of Onset    No Known Problems Mother      No Known Problems Father      No Known Problems Sister      No Known Problems Brother      Cancer Maternal Grandmother          Lung    Heart failure Maternal Grandfather      No Known Problems Paternal Grandmother      No Known Problems Paternal Grandfather     [4]   Social History  Tobacco Use    Smoking status: Some Days     Current packs/day: 0.00     Average packs/day: 0.5 packs/day for 9.0 years (4.5 ttl pk-yrs)     Types: Cigarettes     Start date: 3/19/2010     Last attempt to quit: 3/19/2019     Years since quittin.3    Smokeless tobacco: Never    Tobacco comments:     started to smoke again here and there    Vaping Use    Vaping status: Every Day    Substances: THC   Substance Use Topics    Alcohol use: Yes     Comment: occ    Drug use: No        Ernestina Reeves MD  [phone]

## 2025-07-11 ENCOUNTER — HOSPITAL ENCOUNTER (EMERGENCY)
Facility: HOSPITAL | Age: 35
Discharge: HOME | End: 2025-07-11
Attending: EMERGENCY MEDICINE
Payer: MEDICARE

## 2025-07-11 ENCOUNTER — APPOINTMENT (EMERGENCY)
Dept: ULTRASOUND IMAGING | Facility: HOSPITAL | Age: 35
End: 2025-07-11
Payer: MEDICARE

## 2025-07-11 VITALS
BODY MASS INDEX: 32.79 KG/M2 | SYSTOLIC BLOOD PRESSURE: 121 MMHG | TEMPERATURE: 98.3 F | DIASTOLIC BLOOD PRESSURE: 79 MMHG | OXYGEN SATURATION: 98 % | HEART RATE: 94 BPM | WEIGHT: 191 LBS | RESPIRATION RATE: 18 BRPM

## 2025-07-11 DIAGNOSIS — R10.13 EPIGASTRIC PAIN: ICD-10-CM

## 2025-07-11 DIAGNOSIS — K85.20 ALCOHOLIC PANCREATITIS: Primary | ICD-10-CM

## 2025-07-11 DIAGNOSIS — R11.2 NAUSEA AND VOMITING: ICD-10-CM

## 2025-07-11 DIAGNOSIS — F10.90 ALCOHOL USE DISORDER: ICD-10-CM

## 2025-07-11 LAB
ALBUMIN SERPL BCG-MCNC: 3.9 G/DL (ref 3.5–5)
ALP SERPL-CCNC: 113 U/L (ref 34–104)
ALT SERPL W P-5'-P-CCNC: 19 U/L (ref 7–52)
ANION GAP SERPL CALCULATED.3IONS-SCNC: 9 MMOL/L (ref 4–13)
AST SERPL W P-5'-P-CCNC: 33 U/L (ref 13–39)
ATRIAL RATE: 88 BPM
BASOPHILS # BLD AUTO: 0.06 THOUSANDS/ÂΜL (ref 0–0.1)
BASOPHILS NFR BLD AUTO: 1 % (ref 0–1)
BILIRUB SERPL-MCNC: 0.41 MG/DL (ref 0.2–1)
BUN SERPL-MCNC: 9 MG/DL (ref 5–25)
CALCIUM SERPL-MCNC: 8.3 MG/DL (ref 8.4–10.2)
CARDIAC TROPONIN I PNL SERPL HS: <2 NG/L (ref ?–50)
CHLORIDE SERPL-SCNC: 108 MMOL/L (ref 96–108)
CO2 SERPL-SCNC: 24 MMOL/L (ref 21–32)
CREAT SERPL-MCNC: 1.11 MG/DL (ref 0.6–1.3)
EOSINOPHIL # BLD AUTO: 0.12 THOUSAND/ÂΜL (ref 0–0.61)
EOSINOPHIL NFR BLD AUTO: 2 % (ref 0–6)
ERYTHROCYTE [DISTWIDTH] IN BLOOD BY AUTOMATED COUNT: 18.2 % (ref 11.6–15.1)
GFR SERPL CREATININE-BSD FRML MDRD: 64 ML/MIN/1.73SQ M
GLUCOSE SERPL-MCNC: 115 MG/DL (ref 65–140)
HCG SERPL QL: NEGATIVE
HCT VFR BLD AUTO: 37.4 % (ref 34.8–46.1)
HGB BLD-MCNC: 12 G/DL (ref 11.5–15.4)
IMM GRANULOCYTES # BLD AUTO: 0.02 THOUSAND/UL (ref 0–0.2)
IMM GRANULOCYTES NFR BLD AUTO: 0 % (ref 0–2)
LIPASE SERPL-CCNC: 192 U/L (ref 11–82)
LYMPHOCYTES # BLD AUTO: 3.19 THOUSANDS/ÂΜL (ref 0.6–4.47)
LYMPHOCYTES NFR BLD AUTO: 46 % (ref 14–44)
MCH RBC QN AUTO: 27.2 PG (ref 26.8–34.3)
MCHC RBC AUTO-ENTMCNC: 32.1 G/DL (ref 31.4–37.4)
MCV RBC AUTO: 85 FL (ref 82–98)
MONOCYTES # BLD AUTO: 0.66 THOUSAND/ÂΜL (ref 0.17–1.22)
MONOCYTES NFR BLD AUTO: 9 % (ref 4–12)
NEUTROPHILS # BLD AUTO: 2.99 THOUSANDS/ÂΜL (ref 1.85–7.62)
NEUTS SEG NFR BLD AUTO: 42 % (ref 43–75)
NRBC BLD AUTO-RTO: 0 /100 WBCS
P AXIS: 85 DEGREES
PLATELET # BLD AUTO: 244 THOUSANDS/UL (ref 149–390)
PMV BLD AUTO: 9 FL (ref 8.9–12.7)
POTASSIUM SERPL-SCNC: 3.4 MMOL/L (ref 3.5–5.3)
PR INTERVAL: 142 MS
PROT SERPL-MCNC: 6.9 G/DL (ref 6.4–8.4)
QRS AXIS: 54 DEGREES
QRSD INTERVAL: 82 MS
QT INTERVAL: 366 MS
QTC INTERVAL: 442 MS
RBC # BLD AUTO: 4.41 MILLION/UL (ref 3.81–5.12)
SODIUM SERPL-SCNC: 141 MMOL/L (ref 135–147)
T WAVE AXIS: 60 DEGREES
VENTRICULAR RATE: 88 BPM
WBC # BLD AUTO: 7.04 THOUSAND/UL (ref 4.31–10.16)

## 2025-07-11 PROCEDURE — 76705 ECHO EXAM OF ABDOMEN: CPT | Performed by: EMERGENCY MEDICINE

## 2025-07-11 PROCEDURE — 76705 ECHO EXAM OF ABDOMEN: CPT

## 2025-07-11 PROCEDURE — 96375 TX/PRO/DX INJ NEW DRUG ADDON: CPT

## 2025-07-11 PROCEDURE — 96361 HYDRATE IV INFUSION ADD-ON: CPT

## 2025-07-11 PROCEDURE — 93005 ELECTROCARDIOGRAM TRACING: CPT

## 2025-07-11 PROCEDURE — 80053 COMPREHEN METABOLIC PANEL: CPT

## 2025-07-11 PROCEDURE — 36415 COLL VENOUS BLD VENIPUNCTURE: CPT

## 2025-07-11 PROCEDURE — 99285 EMERGENCY DEPT VISIT HI MDM: CPT | Performed by: EMERGENCY MEDICINE

## 2025-07-11 PROCEDURE — 85025 COMPLETE CBC W/AUTO DIFF WBC: CPT

## 2025-07-11 PROCEDURE — 83690 ASSAY OF LIPASE: CPT

## 2025-07-11 PROCEDURE — 84703 CHORIONIC GONADOTROPIN ASSAY: CPT

## 2025-07-11 PROCEDURE — 99284 EMERGENCY DEPT VISIT MOD MDM: CPT

## 2025-07-11 PROCEDURE — 96365 THER/PROPH/DIAG IV INF INIT: CPT

## 2025-07-11 PROCEDURE — 84484 ASSAY OF TROPONIN QUANT: CPT

## 2025-07-11 RX ORDER — LIDOCAINE HYDROCHLORIDE 20 MG/ML
15 SOLUTION OROPHARYNGEAL ONCE
Status: DISCONTINUED | OUTPATIENT
Start: 2025-07-11 | End: 2025-07-11 | Stop reason: HOSPADM

## 2025-07-11 RX ORDER — ACETAMINOPHEN 10 MG/ML
1000 INJECTION, SOLUTION INTRAVENOUS ONCE
Status: COMPLETED | OUTPATIENT
Start: 2025-07-11 | End: 2025-07-11

## 2025-07-11 RX ORDER — SUCRALFATE 1 G/1
1 TABLET ORAL ONCE
Status: DISCONTINUED | OUTPATIENT
Start: 2025-07-11 | End: 2025-07-11 | Stop reason: HOSPADM

## 2025-07-11 RX ORDER — MAGNESIUM HYDROXIDE/ALUMINUM HYDROXICE/SIMETHICONE 120; 1200; 1200 MG/30ML; MG/30ML; MG/30ML
30 SUSPENSION ORAL ONCE
Status: DISCONTINUED | OUTPATIENT
Start: 2025-07-11 | End: 2025-07-11 | Stop reason: HOSPADM

## 2025-07-11 RX ORDER — ONDANSETRON 2 MG/ML
4 INJECTION INTRAMUSCULAR; INTRAVENOUS ONCE
Status: COMPLETED | OUTPATIENT
Start: 2025-07-11 | End: 2025-07-11

## 2025-07-11 RX ORDER — MORPHINE SULFATE 4 MG/ML
4 INJECTION, SOLUTION INTRAMUSCULAR; INTRAVENOUS ONCE
Status: COMPLETED | OUTPATIENT
Start: 2025-07-11 | End: 2025-07-11

## 2025-07-11 RX ADMIN — SODIUM CHLORIDE 1000 ML: 0.9 INJECTION, SOLUTION INTRAVENOUS at 16:29

## 2025-07-11 RX ADMIN — MORPHINE SULFATE 4 MG: 4 INJECTION INTRAVENOUS at 17:48

## 2025-07-11 RX ADMIN — ONDANSETRON 4 MG: 2 INJECTION INTRAMUSCULAR; INTRAVENOUS at 16:34

## 2025-07-11 RX ADMIN — ACETAMINOPHEN 1000 MG: 10 INJECTION INTRAVENOUS at 16:33

## 2025-07-11 NOTE — Clinical Note
Date: 7/11/2025  Patient: Shayla Davies  Admitted: 7/11/2025  3:56 PM  Attending Provider: Easton Harper MD    Shayla Davies or her authorized caregiver has made the decision for the patient to leave the emergency department against  the advice of her attending physician. She or her authorized caregiver has been informed and understands the inherent risks, including death, worsening pain, worsening dehydration.  She or her authorized caregiver has decided to accept the responsibi lity for this decision. Shayla Davies and all necessary parties have been advised that she may return for further evaluation or treatment. Her condition at time of discharge was stable.  Shayla Davies had current vital signs as follows:  BP  121/79 (BP Location: Right arm)   Pulse 94   Temp 98.3 °F (36.8 °C) (Oral)   Resp 18   Wt 86.6 kg (191 lb)   LMP 06/30/2025 (Exact Date)

## 2025-07-11 NOTE — ED PROVIDER NOTES
Time reflects when diagnosis was documented in both MDM as applicable and the Disposition within this note       Time User Action Codes Description Comment    7/11/2025  6:59 PM Meir Nguyễnel Add [K85.20] Alcoholic pancreatitis     7/11/2025  6:59 PM AntoinemaximilianoMeirel Add [F10.90] Alcohol use disorder     7/11/2025  6:59 PM AntoinemaximilianoMeirel Add [R10.13] Epigastric pain     7/11/2025  6:59 PM AntoinemaximilianoMeirel Add [R11.2] Nausea and vomiting           ED Disposition       ED Disposition   AMA    Condition   --    Date/Time   Fri Jul 11, 2025  7:02 PM    Comment   Date: 7/11/2025  Patient: Shayla Davies  Admitted: 7/11/2025  3:56 PM  Attending Provider: Easton Harper MD    Shayla Davies or her authorized caregiver has made the decision for the patient to leave the emergency department against  the advice of her attending physician. She or her authorized caregiver has been informed and understands the inherent risks, including death, worsening pain, worsening dehydration, permanent disability, chronic pain.  She or her authorized caregiver  has decided to accept the responsibility for this decision. Shayla Davies and all necessary parties have been advised that she may return for further evaluation or treatment. Her condition at time of discharge was stable.  Shayla Davies had  current vital signs as follows:  /79 (BP Location: Right arm)   Pulse 94   Temp 98.3 °F (36.8 °C) (Oral)   Resp 18   Wt 86.6 kg (191 lb)   LMP 06/30/2025 (Exact Date)                Assessment & Plan       Medical Decision Making  Patient seen and examined. Physical exam is notable for epigastric abdominal tenderness. Remainder of exam is within normal limits.    Differential: Gastritis, pancreatitis, ACS, gastroenteritis, complication of alcohol use disorder, less likely SBO    Appropriate labs and imaging ordered. Pain control with Tylenol, GI cocktail, morphine. Patient had CT 9 days ago with no acute abnormalities,  given that pain has worsened but has not changed will hold off on CAT scan at this time.  Bedside ultrasound shows cholelithiasis, formal ultrasound ordered for further characterization.    Labs notable for elevated lipase concerning for pancreatitis.    Imaging shows cholelithiasis without acute cholecystitis. This supports a diagnosis of pancreatitis. All results discussed with patient and family, they express understanding. I recommended the patient be admitted for treatment for pancreatitis, I offered admission at this campus or possible transfer to the [facility] at the detox unit.  Patient initially was unsure and wished to discuss with her .  She was given time to do so.    Patient informed me that she is choosing to leave AGAINST MEDICAL ADVICE and not be admitted for further treatment.  She states her plan is to go home, Close, and return to the ER tomorrow.  She states she will likely go to [facility] where they have the detox unit.  We discussed the pros and cons of leaving.  We discussed the risks including worsening pain, worsening dehydration, permanent injury, permanent disability, death.  Patient is agreeable to this risks and chooses to leave despite them.  Patient was provided with information on pancreatitis as well as strict return precautions.  All questions answered.      Amount and/or Complexity of Data Reviewed  Labs: ordered. Decision-making details documented in ED Course.  Radiology: ordered.    Risk  OTC drugs.  Prescription drug management.        ED Course as of 07/11/25 2008 Fri Jul 11, 2025   1649 LIPASE(!): 192  Trending up from prior lipase 9 days ago   1650 Sodium: 141   1650 Potassium(!): 3.4   1650 WBC: 7.04   1650 Hemoglobin: 12.0   1650 Platelet Count: 244   1654 PREGNANCY, SERUM: Negative   1654 hs TnI 0hr: <2       Medications   sucralfate (CARAFATE) tablet 1 g (0 g Oral Hold 7/11/25 1635)   Lidocaine Viscous HCl (XYLOCAINE) 2 %  mucosal solution 15 mL (0 mL Swish & Swallow Hold 7/11/25 1635)   aluminum-magnesium hydroxide-simethicone (MAALOX) oral suspension 30 mL (0 mL Oral Hold 7/11/25 1635)   sodium chloride 0.9 % bolus 1,000 mL (0 mL Intravenous Stopped 7/11/25 1905)   ondansetron (ZOFRAN) injection 4 mg (4 mg Intravenous Given 7/11/25 1634)   acetaminophen (Ofirmev) injection 1,000 mg (0 mg Intravenous Stopped 7/11/25 1650)   morphine injection 4 mg (4 mg Intravenous Given 7/11/25 1748)       ED Risk Strat Scores                    No data recorded                            History of Present Illness       Chief Complaint   Patient presents with    Abdominal Pain     Upper abdominal/ epigastric pain x3 weeks. Generalized weakness and vomiting this morning.        Past Medical History[1]   Past Surgical History[2]   Family History[3]   Social History[4]   E-Cigarette/Vaping    E-Cigarette Use Current Every Day User       E-Cigarette/Vaping Substances    Nicotine No     THC Yes     CBD No     Flavoring No     Other No     Unknown No       I have reviewed and agree with the history as documented.     Shayla Davies is a 35 y.o. female     They presented to the emergency department on July 11, 2025. Patient presents with:  Abdominal Pain: Upper abdominal/ epigastric pain x3 weeks. Generalized weakness and vomiting this morning.   .    The patient states that she has had upper abdominal pain that radiates to her back for greater than 1 week and has been seen here twice for it recently.  Over the last 2 to 3 days she has developed severe nausea and vomiting that has made it difficult for her to keep down foods or liquids.  She notes some streaks of red and 1 clot in the vomit this morning, nonbilious.  She admits to alcohol use disorder and states she drinks 6-7 shots per day.  She has been trying to cut back and has decreased from 12 shots of vodka per day.  She initially declines admission for detox or rehab and states she has been to  multiple rehabs and will never go again.  She had 1 episode of soft stool diarrhea yesterday. Patient denies chest pain, shortness of breath, fever, chills, hematuria, dysuria, headache, Antonio, dizziness, or any other complaint at this time.            History provided by:  Patient  Abdominal Pain  Associated symptoms: nausea and vomiting    Associated symptoms: no chest pain, no chills, no constipation, no cough, no diarrhea, no dysuria, no fever, no hematuria, no shortness of breath and no sore throat        Review of Systems   Constitutional:  Negative for chills, diaphoresis and fever.   HENT:  Negative for congestion, ear pain, postnasal drip, rhinorrhea and sore throat.    Eyes:  Negative for pain and visual disturbance.   Respiratory:  Negative for cough, chest tightness and shortness of breath.    Cardiovascular:  Negative for chest pain and palpitations.   Gastrointestinal:  Positive for abdominal pain, nausea and vomiting. Negative for constipation and diarrhea.   Genitourinary:  Negative for dysuria and hematuria.   Musculoskeletal:  Negative for arthralgias and back pain.   Skin:  Negative for color change and rash.   Neurological:  Negative for dizziness, seizures, syncope, weakness, light-headedness, numbness and headaches.   All other systems reviewed and are negative.          Objective       ED Triage Vitals   Temperature Pulse Blood Pressure Respirations SpO2 Patient Position - Orthostatic VS   07/11/25 1604 07/11/25 1604 07/11/25 1604 07/11/25 1604 07/11/25 1604 07/11/25 1737   98.3 °F (36.8 °C) (!) 109 140/83 20 97 % Sitting      Temp Source Heart Rate Source BP Location FiO2 (%) Pain Score    07/11/25 1604 07/11/25 1737 07/11/25 1737 -- 07/11/25 1604    Oral Monitor Right arm  7      Vitals      Date and Time Temp Pulse SpO2 Resp BP Pain Score FACES Pain Rating User   07/11/25 1748 -- -- -- -- -- 9 -- KLB   07/11/25 1737 -- 94 98 % 18 121/79 8 -- KLB   07/11/25 1604 98.3 °F (36.8 °C) 109 97 % 20  140/83 7 -- KH            Physical Exam  Constitutional:       General: She is not in acute distress.     Appearance: She is not diaphoretic.   HENT:      Head: Normocephalic and atraumatic.      Nose: No congestion or rhinorrhea.      Mouth/Throat:      Mouth: Mucous membranes are moist.      Pharynx: No oropharyngeal exudate.     Eyes:      General: No scleral icterus.      Cardiovascular:      Rate and Rhythm: Normal rate and regular rhythm.      Heart sounds: Normal heart sounds. No murmur heard.     No friction rub. No gallop.   Pulmonary:      Effort: No respiratory distress.      Breath sounds: Normal breath sounds. No wheezing, rhonchi or rales.   Abdominal:      General: Abdomen is flat. There is no distension.      Palpations: Abdomen is soft.      Tenderness: There is abdominal tenderness in the epigastric area. There is no right CVA tenderness, left CVA tenderness, guarding or rebound. Negative signs include Garrison's sign, Rovsing's sign and McBurney's sign.   Lymphadenopathy:      Cervical: No cervical adenopathy.     Skin:     General: Skin is warm and dry.      Capillary Refill: Capillary refill takes less than 2 seconds.      Findings: No rash.     Neurological:      General: No focal deficit present.      Mental Status: She is alert and oriented to person, place, and time.         Results Reviewed       Procedure Component Value Units Date/Time    HS Troponin 0hr (reflex protocol) [[id number]]  (Normal) Collected: 07/11/25 1624    Lab Status: Final result Specimen: Blood from Arm, Left Updated: 07/11/25 1653     hs TnI 0hr <2 ng/L     hCG, qualitative pregnancy [[id number]]  (Normal) Collected: 07/11/25 1624    Lab Status: Final result Specimen: Blood from Arm, Left Updated: 07/11/25 1653     Preg, Serum Negative    Comprehensive metabolic panel [[id number]]  (Abnormal) Collected: 07/11/25 1624    Lab Status: Final result Specimen: Blood from Arm, Left Updated: 07/11/25 1649     Sodium 141 mmol/L       Potassium 3.4 mmol/L      Chloride 108 mmol/L      CO2 24 mmol/L      ANION GAP 9 mmol/L      BUN 9 mg/dL      Creatinine 1.11 mg/dL      Glucose 115 mg/dL      Calcium 8.3 mg/dL      AST 33 U/L      ALT 19 U/L      Alkaline Phosphatase 113 U/L      Total Protein 6.9 g/dL      Albumin 3.9 g/dL      Total Bilirubin 0.41 mg/dL      eGFR 64 ml/min/1.73sq m     Narrative:      National Kid[employer] Disease Foundation guidelines for Chronic Kidney Disease (CKD):     Stage 1 with normal or high GFR (GFR > 90 mL/min/1.73 square meters)    Stage 2 Mild CKD (GFR = 60-89 mL/min/1.73 square meters)    Stage 3A Moderate CKD (GFR = 45-59 mL/min/1.73 square meters)    Stage 3B Moderate CKD (GFR = 30-44 mL/min/1.73 square meters)    Stage 4 Severe CKD (GFR = 15-29 mL/min/1.73 square meters)    Stage 5 End Stage CKD (GFR <15 mL/min/1.73 square meters)  Note: GFR calculation is accurate only with a steady state creatinine    Lipase [[id number]]  (Abnormal) Collected: 07/11/25 1624    Lab Status: Final result Specimen: Blood from Arm, Left Updated: 07/11/25 1649     Lipase 192 u/L     CBC and differential [[id number]]  (Abnormal) Collected: 07/11/25 1624    Lab Status: Final result Specimen: Blood from Arm, Left Updated: 07/11/25 1630     WBC 7.04 Thousand/uL      RBC 4.41 Million/uL      Hemoglobin 12.0 g/dL      Hematocrit 37.4 %      MCV 85 fL      MCH 27.2 pg      MCHC 32.1 g/dL      RDW 18.2 %      MPV 9.0 fL      Platelets 244 Thousands/uL      nRBC 0 /100 WBCs      Segmented % 42 %      Immature Grans % 0 %      Lymphocytes % 46 %      Monocytes % 9 %      Eosinophils Relative 2 %      Basophils Relative 1 %      Absolute Neutrophils 2.99 Thousands/µL      Absolute Immature Grans 0.02 Thousand/uL      Absolute Lymphocytes 3.19 Thousands/µL      Absolute Monocytes 0.66 Thousand/µL      Eosinophils Absolute 0.12 Thousand/µL      Basophils Absolute 0.06 Thousands/µL             US right upper quadrant   Final Interpretation by Eric VILLA  MD Ryan (07/11 1006)      Cholelithiasis without evidence of acute cholecystitis.      Workstation performed: [id number]             POC Biliary US    Date/Time: 7/11/2025 5:22 PM    Performed by: Nas Nguyễn MD  Authorized by: Nas Nguyễn MD    Patient location:  ED  Performed by:  Resident  Other Assisting Provider: No    Procedure details:     Exam Type:  Diagnostic    Indications: epigastric pain      Assessment for:  Cholecystitis and cholelithiasis    Views obtained: gallbladder (transverse and longitudinal), common bile duct and portal triad      Image quality: limited diagnostic      Image availability:  Images available in PACS  Findings:     Cholelithiasis: identified      Common bile duct:  Normal    Gallbladder wall:  Normal    Pericholecystic fluid: not identified      Sonographic Garrison's sign: negative      Polyps: not identified      Mass: not identified    Interpretation:     Biliary ultrasound impressions: cholelithiasis        ED Medication and Procedure Management   Prior to Admission Medications   Prescriptions Last Dose Informant Patient Reported? Taking?   Docusate Sodium (DOK) 100 MG capsule  Self No No   Sig: Take 1 tablet (100 mg total) by mouth 2 (two) times a day as needed for constipation   Multiple Vitamin (Daily Vites) tablet   No No   Sig: TAKE 1 TABLET BY MOUTH EVERY DAY   Multiple Vitamins-Minerals (Womens Multivitamin) TABS  Self No No   Sig: Take 1 tablet by mouth daily   acetaminophen (TYLENOL) 325 mg tablet  Self No No   Sig: Take 2 tablets (650 mg total) by mouth every 4 (four) hours as needed for mild pain or headaches   busPIRone (BUSPAR) 7.5 mg tablet   No No   Sig: Take 1 tablet (7.5 mg total) by mouth in the morning and 1 tablet (7.5 mg total) in the evening.   chlorhexidine (PERIDEX) 0.12 % solution  Self Yes No   Sig: PLEASE SEE ATTACHED FOR DETAILED DIRECTIONS   hydrOXYzine HCL (ATARAX) 25 mg tablet  Self No No   Sig: Take 1 tablet (25 mg total) by mouth every 6  (six) hours as needed for anxiety   levonorgestrel (MIRENA) 20 MCG/24HR IUD  Self Yes No   Si each by Intrauterine route   ondansetron (ZOFRAN) 4 mg tablet   No No   Sig: Take 1 tablet (4 mg total) by mouth every 6 (six) hours   oxyCODONE (ROXICODONE) 5 mg immediate release tablet  Self Yes No   Sig: TAKE 1 TABLET BY MOUTH EVERY 6 HOURS AS NEEDED FOR MODERATE PAIN. MAX DAILY AMOUNT  20 MG   pantoprazole (PROTONIX) 20 mg tablet   No No   Sig: Take 2 tablets (40 mg total) by mouth daily   sertraline (Zoloft) 25 mg tablet   No No   Sig: Take 1 tablet (25 mg total) by mouth in the morning.   sertraline (Zoloft) 50 mg tablet   No No   Sig: Take 1 tablet (50 mg total) by mouth in the morning.      Facility-Administered Medications: None     Discharge Medication List as of 2025  7:02 PM        CONTINUE these medications which have NOT CHANGED    Details   acetaminophen (TYLENOL) 325 mg tablet Take 2 tablets (650 mg total) by mouth every 4 (four) hours as needed for mild pain or headaches, Starting Tue 10/22/2019, Print      busPIRone (BUSPAR) 7.5 mg tablet Take 1 tablet (7.5 mg total) by mouth in the morning and 1 tablet (7.5 mg total) in the evening., Starting 2022, Normal      chlorhexidine (PERIDEX) 0.12 % solution PLEASE SEE ATTACHED FOR DETAILED DIRECTIONS, Historical Med      Docusate Sodium (DOK) 100 MG capsule Take 1 tablet (100 mg total) by mouth 2 (two) times a day as needed for constipation, Starting Tue 10/22/2019, Print      hydrOXYzine HCL (ATARAX) 25 mg tablet Take 1 tablet (25 mg total) by mouth every 6 (six) hours as needed for anxiety, Starting 2022, Normal      levonorgestrel (MIRENA) 20 MCG/24HR IUD 1 each by Intrauterine route, Historical Med      Multiple Vitamin (Daily Vites) tablet TAKE 1 TABLET BY MOUTH EVERY DAY, Normal      Multiple Vitamins-Minerals (Womens Multivitamin) TABS Take 1 tablet by mouth daily, Starting 2021, Normal      ondansetron (ZOFRAN) 4 mg  tablet Take 1 tablet (4 mg total) by mouth every 6 (six) hours, Starting Sat 7/5/2025, Normal      oxyCODONE (ROXICODONE) 5 mg immediate release tablet TAKE 1 TABLET BY MOUTH EVERY 6 HOURS AS NEEDED FOR MODERATE PAIN. MAX DAILY AMOUNT  20 MG, Historical Med      pantoprazole (PROTONIX) 20 mg tablet Take 2 tablets (40 mg total) by mouth daily, Starting Sat 7/5/2025, Normal      !! sertraline (Zoloft) 25 mg tablet Take 1 tablet (25 mg total) by mouth in the morning., Starting Mon 5/23/2022, Normal      !! sertraline (Zoloft) 50 mg tablet Take 1 tablet (50 mg total) by mouth in the morning., Starting Mon 5/23/2022, Normal       !! - Potential duplicate medications found. Please discuss with provider.        No discharge procedures on file.  ED SEPSIS DOCUMENTATION   Time reflects when diagnosis was documented in both MDM as applicable and the Disposition within this note       Time User Action Codes Description Comment    7/11/2025  6:59 PM Nas Nguyễn [K85.20] Alcoholic pancreatitis     7/11/2025  6:59 PM Nas Nguyễn [F10.90] Alcohol use disorder     7/11/2025  6:59 PM Nas Nguyễn [R10.13] Epigastric pain     7/11/2025  6:59 PM Nas Nguyễn [R11.2] Nausea and vomiting                      [1]   Past Medical History:  Diagnosis Date    Abnormal Pap smear of cervix     ASCUS 12/16, ASCUS cannot exclude HGIL 2/19    Anemia     Depression     hx of depression     GERD (gastroesophageal reflux disease)     resolved per pt     History of removal of ovarian cyst     Increased BMI     Shingles     with pregnancy    Subcutaneous cyst     Varicella     had chicken pox as child/shingles with pregnancy   [2]   Past Surgical History:  Procedure Laterality Date    APPENDECTOMY      BARIATRIC SURGERY      GASTRIC BYPASS      2010    OTHER SURGICAL HISTORY  05/24/2021    bilat breast reduction and lift    OVARIAN CYST REMOVAL      2011   [3]   Family History  Problem Relation Name Age of Onset    No Known Problems  Mother      No Known Problems Father      No Known Problems Sister      No Known Problems Brother      Cancer Maternal Grandmother          Lung    Heart failure Maternal Grandfather      No Known Problems Paternal Grandmother      No Known Problems Paternal Grandfather     [4]   Social History  Tobacco Use    Smoking status: Former     Current packs/day: 0.00     Average packs/day: 0.5 packs/day for 9.0 years (4.5 ttl pk-yrs)     Types: Cigarettes     Start date: 3/19/2010     Quit date: 3/19/2019     Years since quittin.3    Smokeless tobacco: Never    Tobacco comments:     started to smoke again here and there    Vaping Use    Vaping status: Every Day    Substances: THC   Substance Use Topics    Alcohol use: Yes     Comment: 4 shots per day    Drug use: No        Nas Nguyễn MD  25

## 2025-07-11 NOTE — DISCHARGE INSTRUCTIONS
Please return to the ER if you experience severe pain, vomiting, fever, or any other symptom that is concerning to you.    If you choose are interested in detox you may return here or go to [facility] Emergency Room for evaluation for detox.

## 2025-07-12 NOTE — ED ATTENDING ATTESTATION
7/11/2025  I, Easton Harper MD, saw and evaluated the patient. I have discussed the patient with the resident/non-physician practitioner and agree with the resident's/non-physician practitioner's findings, Plan of Care, and MDM as documented in the resident's/non-physician practitioner's note, except where noted. All available labs and Radiology studies were reviewed.  I was present for key portions of any procedure(s) performed by the resident/non-physician practitioner and I was immediately available to provide assistance.       At this point I agree with the current assessment done in the Emergency Department.  I have conducted an independent evaluation of this patient a history and physical is as follows:    35-year-old female with history of alcohol abuse presents to the emergency department for evaluation of abdominal pain.  Patient reports persistent epigastric abdominal pain over the past 2 weeks.  Patient describes it as a sharp pain with radiation to her back.  She reports associated episodes of nausea and vomiting.  1 episode of diarrhea today.  Patient states that she still drinks alcohol daily.  Primarily vodka.  No headache, blurry vision, fever, chills, sick contacts, recent travel, chest pain, shortness of breath, lower leg pain/swelling or localized numbness, tingling or weakness.    A 10 point review of systems was conducted and negative except for as stated above in HPI    Physical Exam  Vitals and nursing note reviewed.   Constitutional:       General: She is not in acute distress.     Appearance: She is well-developed.   HENT:      Head: Normocephalic and atraumatic.     Eyes:      Conjunctiva/sclera: Conjunctivae normal.       Cardiovascular:      Rate and Rhythm: Normal rate and regular rhythm.      Heart sounds: No murmur heard.  Pulmonary:      Effort: Pulmonary effort is normal. No respiratory distress.      Breath sounds: Normal breath sounds.   Abdominal:      Palpations: Abdomen  is soft.      Tenderness: There is abdominal tenderness in the epigastric area. There is no guarding or rebound.     Musculoskeletal:         General: No swelling.      Cervical back: Neck supple.     Skin:     General: Skin is warm and dry.      Capillary Refill: Capillary refill takes less than 2 seconds.     Neurological:      Mental Status: She is alert.     Psychiatric:         Mood and Affect: Mood normal.           ED Course     35-year-old female presented to the emergency department for evaluation of persistent abdominal pain.  Workup done emergency department was consistent with pancreatitis.  Patient treated with improvement of symptoms.  Recommendation was made for the patient to be admitted for detox and for further treatment evaluation of her pancreatitis.  Patient declined admission.  Risks were discussed including but not limited to death, worsening condition, permanent disability.  Patient acknowledged understanding the risks and signed out AGAINST MEDICAL ADVICE.    Critical Care Time  Procedures

## 2025-07-14 ENCOUNTER — HOSPITAL ENCOUNTER (INPATIENT)
Facility: HOSPITAL | Age: 35
LOS: 3 days | Discharge: HOME | DRG: 263 | End: 2025-07-19
Attending: EMERGENCY MEDICINE | Admitting: INTERNAL MEDICINE
Payer: MEDICARE

## 2025-07-14 ENCOUNTER — APPOINTMENT (EMERGENCY)
Dept: CT IMAGING | Facility: HOSPITAL | Age: 35
DRG: 263 | End: 2025-07-14
Payer: MEDICARE

## 2025-07-14 DIAGNOSIS — R10.13 EPIGASTRIC PAIN: ICD-10-CM

## 2025-07-14 DIAGNOSIS — K80.20 CHOLELITHIASIS: ICD-10-CM

## 2025-07-14 DIAGNOSIS — R11.2 NAUSEA VOMITING AND DIARRHEA: ICD-10-CM

## 2025-07-14 DIAGNOSIS — R10.9 ABDOMINAL PAIN: Primary | ICD-10-CM

## 2025-07-14 DIAGNOSIS — F10.90 ALCOHOL USE DISORDER: ICD-10-CM

## 2025-07-14 DIAGNOSIS — R19.7 NAUSEA VOMITING AND DIARRHEA: ICD-10-CM

## 2025-07-14 DIAGNOSIS — K29.70 GASTRITIS: ICD-10-CM

## 2025-07-14 PROBLEM — F31.9 BIPOLAR DISORDER (HCC): Status: ACTIVE | Noted: 2025-07-14

## 2025-07-14 PROBLEM — F10.10 ALCOHOL ABUSE: Status: ACTIVE | Noted: 2025-07-14

## 2025-07-14 LAB
ALBUMIN SERPL BCG-MCNC: 3.9 G/DL (ref 3.5–5)
ALP SERPL-CCNC: 118 U/L (ref 34–104)
ALT SERPL W P-5'-P-CCNC: 29 U/L (ref 7–52)
ANION GAP SERPL CALCULATED.3IONS-SCNC: 10 MMOL/L (ref 4–13)
AST SERPL W P-5'-P-CCNC: 70 U/L (ref 13–39)
BASOPHILS # BLD AUTO: 0.04 THOUSANDS/ÂΜL (ref 0–0.1)
BASOPHILS NFR BLD AUTO: 1 % (ref 0–1)
BILIRUB SERPL-MCNC: 0.86 MG/DL (ref 0.2–1)
BUN SERPL-MCNC: 12 MG/DL (ref 5–25)
CALCIUM SERPL-MCNC: 8.6 MG/DL (ref 8.4–10.2)
CHLORIDE SERPL-SCNC: 105 MMOL/L (ref 96–108)
CO2 SERPL-SCNC: 25 MMOL/L (ref 21–32)
CREAT SERPL-MCNC: 1.04 MG/DL (ref 0.6–1.3)
EOSINOPHIL # BLD AUTO: 0.04 THOUSAND/ÂΜL (ref 0–0.61)
EOSINOPHIL NFR BLD AUTO: 1 % (ref 0–6)
ERYTHROCYTE [DISTWIDTH] IN BLOOD BY AUTOMATED COUNT: 17.9 % (ref 11.6–15.1)
GFR SERPL CREATININE-BSD FRML MDRD: 69 ML/MIN/1.73SQ M
GLUCOSE SERPL-MCNC: 96 MG/DL (ref 65–140)
HCG SERPL QL: NEGATIVE
HCT VFR BLD AUTO: 39.4 % (ref 34.8–46.1)
HGB BLD-MCNC: 12.9 G/DL (ref 11.5–15.4)
IMM GRANULOCYTES # BLD AUTO: 0.01 THOUSAND/UL (ref 0–0.2)
IMM GRANULOCYTES NFR BLD AUTO: 0 % (ref 0–2)
LACTATE SERPL-SCNC: 1.9 MMOL/L (ref 0.5–2)
LIPASE SERPL-CCNC: 86 U/L (ref 11–82)
LYMPHOCYTES # BLD AUTO: 1.38 THOUSANDS/ÂΜL (ref 0.6–4.47)
LYMPHOCYTES NFR BLD AUTO: 28 % (ref 14–44)
MCH RBC QN AUTO: 27.6 PG (ref 26.8–34.3)
MCHC RBC AUTO-ENTMCNC: 32.7 G/DL (ref 31.4–37.4)
MCV RBC AUTO: 84 FL (ref 82–98)
MONOCYTES # BLD AUTO: 0.53 THOUSAND/ÂΜL (ref 0.17–1.22)
MONOCYTES NFR BLD AUTO: 11 % (ref 4–12)
NEUTROPHILS # BLD AUTO: 2.95 THOUSANDS/ÂΜL (ref 1.85–7.62)
NEUTS SEG NFR BLD AUTO: 59 % (ref 43–75)
NRBC BLD AUTO-RTO: 0 /100 WBCS
PLATELET # BLD AUTO: 221 THOUSANDS/UL (ref 149–390)
PMV BLD AUTO: 8.9 FL (ref 8.9–12.7)
POTASSIUM SERPL-SCNC: 3.8 MMOL/L (ref 3.5–5.3)
PROT SERPL-MCNC: 6.8 G/DL (ref 6.4–8.4)
RBC # BLD AUTO: 4.68 MILLION/UL (ref 3.81–5.12)
SODIUM SERPL-SCNC: 140 MMOL/L (ref 135–147)
WBC # BLD AUTO: 4.95 THOUSAND/UL (ref 4.31–10.16)

## 2025-07-14 PROCEDURE — 80053 COMPREHEN METABOLIC PANEL: CPT

## 2025-07-14 PROCEDURE — 96366 THER/PROPH/DIAG IV INF ADDON: CPT

## 2025-07-14 PROCEDURE — 99285 EMERGENCY DEPT VISIT HI MDM: CPT | Performed by: EMERGENCY MEDICINE

## 2025-07-14 PROCEDURE — 99223 1ST HOSP IP/OBS HIGH 75: CPT | Performed by: INTERNAL MEDICINE

## 2025-07-14 PROCEDURE — 85025 COMPLETE CBC W/AUTO DIFF WBC: CPT

## 2025-07-14 PROCEDURE — 83605 ASSAY OF LACTIC ACID: CPT

## 2025-07-14 PROCEDURE — 99284 EMERGENCY DEPT VISIT MOD MDM: CPT

## 2025-07-14 PROCEDURE — 96365 THER/PROPH/DIAG IV INF INIT: CPT

## 2025-07-14 PROCEDURE — 84703 CHORIONIC GONADOTROPIN ASSAY: CPT

## 2025-07-14 PROCEDURE — 83690 ASSAY OF LIPASE: CPT

## 2025-07-14 PROCEDURE — 81003 URINALYSIS AUTO W/O SCOPE: CPT | Performed by: PHYSICIAN ASSISTANT

## 2025-07-14 PROCEDURE — 96376 TX/PRO/DX INJ SAME DRUG ADON: CPT

## 2025-07-14 PROCEDURE — 74177 CT ABD & PELVIS W/CONTRAST: CPT

## 2025-07-14 PROCEDURE — 36415 COLL VENOUS BLD VENIPUNCTURE: CPT

## 2025-07-14 PROCEDURE — 96375 TX/PRO/DX INJ NEW DRUG ADDON: CPT

## 2025-07-14 PROCEDURE — 80307 DRUG TEST PRSMV CHEM ANLYZR: CPT | Performed by: PHYSICIAN ASSISTANT

## 2025-07-14 RX ORDER — ONDANSETRON 2 MG/ML
4 INJECTION INTRAMUSCULAR; INTRAVENOUS ONCE
Status: COMPLETED | OUTPATIENT
Start: 2025-07-14 | End: 2025-07-14

## 2025-07-14 RX ORDER — FOLIC ACID 1 MG/1
1 TABLET ORAL DAILY
Status: DISCONTINUED | OUTPATIENT
Start: 2025-07-14 | End: 2025-07-19 | Stop reason: HOSPADM

## 2025-07-14 RX ORDER — PANTOPRAZOLE SODIUM 40 MG/1
40 TABLET, DELAYED RELEASE ORAL DAILY
Status: DISCONTINUED | OUTPATIENT
Start: 2025-07-15 | End: 2025-07-14

## 2025-07-14 RX ORDER — DOXEPIN HYDROCHLORIDE 100 MG/1
100 CAPSULE ORAL
COMMUNITY
Start: 2025-05-27

## 2025-07-14 RX ORDER — BUPROPION HYDROCHLORIDE 150 MG/1
150 TABLET ORAL EVERY MORNING
Status: DISCONTINUED | OUTPATIENT
Start: 2025-07-15 | End: 2025-07-19 | Stop reason: HOSPADM

## 2025-07-14 RX ORDER — MORPHINE SULFATE 4 MG/ML
4 INJECTION, SOLUTION INTRAMUSCULAR; INTRAVENOUS
Status: DISCONTINUED | OUTPATIENT
Start: 2025-07-14 | End: 2025-07-19 | Stop reason: HOSPADM

## 2025-07-14 RX ORDER — CLONIDINE HYDROCHLORIDE 0.1 MG/1
0.1 TABLET ORAL DAILY PRN
COMMUNITY
Start: 2025-04-23

## 2025-07-14 RX ORDER — SODIUM CHLORIDE, SODIUM LACTATE, POTASSIUM CHLORIDE, CALCIUM CHLORIDE 600; 310; 30; 20 MG/100ML; MG/100ML; MG/100ML; MG/100ML
150 INJECTION, SOLUTION INTRAVENOUS CONTINUOUS
Status: DISCONTINUED | OUTPATIENT
Start: 2025-07-14 | End: 2025-07-16

## 2025-07-14 RX ORDER — PANTOPRAZOLE SODIUM 40 MG/10ML
40 INJECTION, POWDER, LYOPHILIZED, FOR SOLUTION INTRAVENOUS
Status: DISCONTINUED | OUTPATIENT
Start: 2025-07-15 | End: 2025-07-14

## 2025-07-14 RX ORDER — GABAPENTIN 400 MG/1
800 CAPSULE ORAL 3 TIMES DAILY
Status: DISCONTINUED | OUTPATIENT
Start: 2025-07-14 | End: 2025-07-19 | Stop reason: HOSPADM

## 2025-07-14 RX ORDER — ONDANSETRON 2 MG/ML
4 INJECTION INTRAMUSCULAR; INTRAVENOUS EVERY 6 HOURS PRN
Status: DISCONTINUED | OUTPATIENT
Start: 2025-07-14 | End: 2025-07-14

## 2025-07-14 RX ORDER — BUPROPION HYDROCHLORIDE 150 MG/1
150 TABLET ORAL EVERY MORNING
COMMUNITY
Start: 2025-05-22

## 2025-07-14 RX ORDER — FAMOTIDINE 10 MG/ML
20 INJECTION, SOLUTION INTRAVENOUS ONCE
Status: COMPLETED | OUTPATIENT
Start: 2025-07-14 | End: 2025-07-14

## 2025-07-14 RX ORDER — MAGNESIUM HYDROXIDE/ALUMINUM HYDROXICE/SIMETHICONE 120; 1200; 1200 MG/30ML; MG/30ML; MG/30ML
30 SUSPENSION ORAL EVERY 6 HOURS PRN
Status: DISCONTINUED | OUTPATIENT
Start: 2025-07-14 | End: 2025-07-19 | Stop reason: HOSPADM

## 2025-07-14 RX ORDER — GABAPENTIN 800 MG/1
800 TABLET ORAL 3 TIMES DAILY
COMMUNITY

## 2025-07-14 RX ORDER — LEVETIRACETAM 750 MG/1
750 TABLET ORAL 2 TIMES DAILY
COMMUNITY
Start: 2025-05-27 | End: 2025-07-26

## 2025-07-14 RX ORDER — QUETIAPINE FUMARATE 100 MG/1
100 TABLET, FILM COATED ORAL 2 TIMES DAILY
COMMUNITY
Start: 2025-05-22

## 2025-07-14 RX ORDER — MORPHINE SULFATE 4 MG/ML
4 INJECTION, SOLUTION INTRAMUSCULAR; INTRAVENOUS ONCE
Status: COMPLETED | OUTPATIENT
Start: 2025-07-14 | End: 2025-07-14

## 2025-07-14 RX ORDER — DOXEPIN HYDROCHLORIDE 100 MG/1
100 CAPSULE ORAL
Status: DISCONTINUED | OUTPATIENT
Start: 2025-07-14 | End: 2025-07-19 | Stop reason: HOSPADM

## 2025-07-14 RX ORDER — PANTOPRAZOLE SODIUM 40 MG/10ML
40 INJECTION, POWDER, LYOPHILIZED, FOR SOLUTION INTRAVENOUS EVERY 12 HOURS SCHEDULED
Status: DISCONTINUED | OUTPATIENT
Start: 2025-07-15 | End: 2025-07-19 | Stop reason: HOSPADM

## 2025-07-14 RX ORDER — PANTOPRAZOLE SODIUM 40 MG/10ML
40 INJECTION, POWDER, LYOPHILIZED, FOR SOLUTION INTRAVENOUS ONCE
Status: COMPLETED | OUTPATIENT
Start: 2025-07-14 | End: 2025-07-14

## 2025-07-14 RX ORDER — ACETAMINOPHEN 10 MG/ML
1000 INJECTION, SOLUTION INTRAVENOUS EVERY 8 HOURS PRN
Status: DISCONTINUED | OUTPATIENT
Start: 2025-07-14 | End: 2025-07-19 | Stop reason: HOSPADM

## 2025-07-14 RX ORDER — SODIUM CHLORIDE, SODIUM GLUCONATE, SODIUM ACETATE, POTASSIUM CHLORIDE, MAGNESIUM CHLORIDE, SODIUM PHOSPHATE, DIBASIC, AND POTASSIUM PHOSPHATE .53; .5; .37; .037; .03; .012; .00082 G/100ML; G/100ML; G/100ML; G/100ML; G/100ML; G/100ML; G/100ML
1000 INJECTION, SOLUTION INTRAVENOUS ONCE
Status: COMPLETED | OUTPATIENT
Start: 2025-07-14 | End: 2025-07-14

## 2025-07-14 RX ORDER — QUETIAPINE FUMARATE 100 MG/1
100 TABLET, FILM COATED ORAL 2 TIMES DAILY
Status: DISCONTINUED | OUTPATIENT
Start: 2025-07-14 | End: 2025-07-19 | Stop reason: HOSPADM

## 2025-07-14 RX ORDER — ONDANSETRON 2 MG/ML
4 INJECTION INTRAMUSCULAR; INTRAVENOUS EVERY 4 HOURS PRN
Status: DISCONTINUED | OUTPATIENT
Start: 2025-07-14 | End: 2025-07-19 | Stop reason: HOSPADM

## 2025-07-14 RX ORDER — LANOLIN ALCOHOL/MO/W.PET/CERES
100 CREAM (GRAM) TOPICAL DAILY
Status: DISCONTINUED | OUTPATIENT
Start: 2025-07-14 | End: 2025-07-19 | Stop reason: HOSPADM

## 2025-07-14 RX ORDER — QUETIAPINE FUMARATE 50 MG/1
50 TABLET, FILM COATED ORAL 2 TIMES DAILY
COMMUNITY

## 2025-07-14 RX ORDER — OXYCODONE HYDROCHLORIDE 5 MG/1
5 TABLET ORAL EVERY 4 HOURS PRN
Refills: 0 | Status: DISCONTINUED | OUTPATIENT
Start: 2025-07-14 | End: 2025-07-19 | Stop reason: HOSPADM

## 2025-07-14 RX ORDER — HYDROXYZINE HYDROCHLORIDE 25 MG/1
25 TABLET, FILM COATED ORAL EVERY 6 HOURS PRN
Status: DISCONTINUED | OUTPATIENT
Start: 2025-07-14 | End: 2025-07-19 | Stop reason: HOSPADM

## 2025-07-14 RX ADMIN — LEVETIRACETAM 750 MG: 250 TABLET, FILM COATED ORAL at 17:56

## 2025-07-14 RX ADMIN — FOLIC ACID 1 MG: 1 TABLET ORAL at 16:30

## 2025-07-14 RX ADMIN — MORPHINE SULFATE 4 MG: 4 INJECTION INTRAVENOUS at 12:52

## 2025-07-14 RX ADMIN — MORPHINE SULFATE 2 MG: 2 INJECTION, SOLUTION INTRAMUSCULAR; INTRAVENOUS at 23:40

## 2025-07-14 RX ADMIN — ONDANSETRON 4 MG: 2 INJECTION INTRAMUSCULAR; INTRAVENOUS at 14:31

## 2025-07-14 RX ADMIN — FAMOTIDINE 20 MG: 10 INJECTION, SOLUTION INTRAVENOUS at 16:36

## 2025-07-14 RX ADMIN — GABAPENTIN 800 MG: 400 CAPSULE ORAL at 17:56

## 2025-07-14 RX ADMIN — IOHEXOL 100 ML: 350 INJECTION, SOLUTION INTRAVENOUS at 14:55

## 2025-07-14 RX ADMIN — ONDANSETRON 4 MG: 2 INJECTION INTRAMUSCULAR; INTRAVENOUS at 12:55

## 2025-07-14 RX ADMIN — MORPHINE SULFATE 2 MG: 2 INJECTION, SOLUTION INTRAMUSCULAR; INTRAVENOUS at 20:15

## 2025-07-14 RX ADMIN — QUETIAPINE FUMARATE 100 MG: 100 TABLET ORAL at 17:56

## 2025-07-14 RX ADMIN — PANTOPRAZOLE SODIUM 40 MG: 40 INJECTION, POWDER, FOR SOLUTION INTRAVENOUS at 16:32

## 2025-07-14 RX ADMIN — THIAMINE HCL TAB 100 MG 100 MG: 100 TAB at 16:30

## 2025-07-14 RX ADMIN — MORPHINE SULFATE 2 MG: 2 INJECTION, SOLUTION INTRAMUSCULAR; INTRAVENOUS at 17:12

## 2025-07-14 RX ADMIN — SODIUM CHLORIDE, SODIUM LACTATE, POTASSIUM CHLORIDE, AND CALCIUM CHLORIDE 150 ML/HR: .6; .31; .03; .02 INJECTION, SOLUTION INTRAVENOUS at 17:32

## 2025-07-14 RX ADMIN — MORPHINE SULFATE 2 MG: 2 INJECTION, SOLUTION INTRAMUSCULAR; INTRAVENOUS at 15:03

## 2025-07-14 RX ADMIN — SODIUM CHLORIDE, SODIUM GLUCONATE, SODIUM ACETATE, POTASSIUM CHLORIDE, MAGNESIUM CHLORIDE, SODIUM PHOSPHATE, DIBASIC, AND POTASSIUM PHOSPHATE 1000 ML: .53; .5; .37; .037; .03; .012; .00082 INJECTION, SOLUTION INTRAVENOUS at 12:48

## 2025-07-14 NOTE — ASSESSMENT & PLAN NOTE
Patient presented with ongoing epigastric pain with persistent nausea/vomiting for nearly the last 2 weeks. Patient had been seen in ED 3 times for this, most recently leaving AMA on 7/11.   Likely due to possible gastritis versus pancreatitis with continued alcohol use contributing  CT a/p showed no evidence of acute pancreatitis  Lipase 86 (improved from 192 on 7/11)  No concern for severe pancreatitis or necrosis.  Aggressive IVFs with LR x 48 hrs, trial clear liquids  Pain management, antiemetics, supportive care  GI consult, may benefit from further imaging/EGD  IV PPI BID, avoid NSAIDs

## 2025-07-14 NOTE — H&P
H&P - Hospitalist   Name: Shayla Davies 35 y.o. female I MRN: [id number]  Unit/Bed#: ED 14 I Date of Admission: 7/14/2025   Date of Service: 7/14/2025 I Hospital Day: 0     Assessment & Plan  Epigastric pain  Patient presented with ongoing epigastric pain with persistent nausea/vomiting for nearly the last 2 weeks. Patient had been seen in ED 3 times for this, most recently leaving AMA on 7/11.   Likely due to possible gastritis versus pancreatitis with continued alcohol use contributing  CT a/p showed no evidence of acute pancreatitis  Lipase 86 (improved from 192 on 7/11)  No concern for severe pancreatitis or necrosis.  Aggressive IVFs with LR x 48 hrs, trial clear liquids  Pain management, antiemetics, supportive care  GI consult, may benefit from further imaging/EGD  IV PPI BID, avoid NSAIDs  Alcohol use disorder  Reports chronic daily alcohol use, though has worsened over the last few weeks with last drink 7/13  Patient reported to myself that she drinks approximately 3 shots of liquor daily, however reported to ED that she sometimes drinks up to 12 shots per day  Denies history of withdrawal seizures  CIWA protocol, continue PTA gabapentin  Thiamine, folate, multivitamin supplementation  Case management consult for CATCH evaluation, patient declines transfer to detox unit at this time  Bipolar disorder (HCC)  Continue PTA Keppra, Seroquel, Atarax, Sinequan, Wellbutrin  History of bariatric surgery  Remote history of gastric bypass many years ago  Nicotine dependence  Reports chronic vaping, declines nicotine patches      VTE Pharmacologic Prophylaxis: VTE Score: 1 Low Risk (Score 0-2) - Encourage Ambulation.  Code Status: Level 1 - Full Code   Discussion with family: Updated  () at bedside.    Anticipated Length of Stay: Patient will be admitted on an observation basis with an anticipated length of stay of less than 2 midnights secondary to epigastric pain, nausea/vomiting.    History  of Present Illness   Chief Complaint: Epigastric pain, nausea/vomiting    Shayla Davies is a 35 y.o. female with a PMH of alcohol use disorder, prior methamphetamine use, bipolar disorder, nicotine dependence, who presents with persistent epigastric pain and nausea/vomiting, ongoing for the last 2 weeks. Patient reports constant, severe, 10/10 epigastric pain with some radiation to RUQ.  Patient reports nausea with vomiting daily, denies hematemesis or coffee-ground emesis or NSAID use.  Patient does admit that her alcohol use has gotten worse recently, drinking daily though denies any severe withdrawal symptoms or seizures. Patient had been seen in the ED multiple times (7/2, 7/5, 7/11), was only stable for discharge on 7/5 and the other two encounters patient had left AMA.     Review of Systems   Constitutional:  Positive for appetite change. Negative for activity change and fever.   Respiratory:  Negative for chest tightness and shortness of breath.    Cardiovascular:  Negative for chest pain and palpitations.   Gastrointestinal:  Positive for abdominal pain, nausea and vomiting. Negative for abdominal distention, constipation and diarrhea.   Genitourinary:  Negative for difficulty urinating, dysuria and hematuria.   Musculoskeletal:  Negative for back pain and gait problem.   Neurological:  Negative for dizziness, tremors, seizures, weakness and headaches.   Psychiatric/Behavioral:  The patient is nervous/anxious.        Historical Information   Past Medical History[1]  Past Surgical History[2]  Social History[3]  E-Cigarette/Vaping    E-Cigarette Use Current Every Day User      E-Cigarette/Vaping Substances    Nicotine No     THC Yes     CBD No     Flavoring No     Other No     Unknown No      Family History[4]  Social History:  Marital Status: Single   Occupation: NA  Patient Pre-hospital Living Situation: Home, With spouse  Patient Pre-hospital Level of Mobility: walks  Patient Pre-hospital Diet  Restrictions: None    Meds/Allergies   I have reviewed home medications with patient personally.  Prior to Admission medications    Medication Sig Start Date End Date Taking? Authorizing Provider   buPROPion (WELLBUTRIN XL) 150 mg 24 hr tablet Take 150 mg by mouth every morning 5/22/25  Yes Historical Provider, MD   cloNIDine (CATAPRES) 0.1 mg tablet Take 0.1 mg by mouth daily as needed (anxiety) 4/23/25  Yes Historical Provider, MD   doxepin (SINEquan) 100 mg capsule Take 100 mg by mouth daily at bedtime 5/27/25  Yes Historical Provider, MD   gabapentin (NEURONTIN) 800 mg tablet Take 800 mg by mouth 3 (three) times a day   Yes Historical Provider, MD   levETIRAcetam (KEPPRA) 750 mg tablet Take 750 mg by mouth 2 (two) times a day 5/27/25  Yes Historical Provider, MD   QUEtiapine (SEROquel) 100 mg tablet Take 100 mg by mouth 2 (two) times a day 5/22/25  Yes Historical Provider, MD   QUEtiapine (SEROquel) 50 mg tablet Take 50 mg by mouth 2 (two) times a day   Yes Historical Provider, MD   acetaminophen (TYLENOL) 325 mg tablet Take 2 tablets (650 mg total) by mouth every 4 (four) hours as needed for mild pain or headaches 10/22/19   Woo De Anda MD   busPIRone (BUSPAR) 7.5 mg tablet Take 1 tablet (7.5 mg total) by mouth in the morning and 1 tablet (7.5 mg total) in the evening.  Patient not taking: Reported on 7/14/2025 5/20/22   Diana Evans MD   chlorhexidine (PERIDEX) 0.12 % solution PLEASE SEE ATTACHED FOR DETAILED DIRECTIONS 6/11/21   Historical Provider, MD   Docusate Sodium (DOK) 100 MG capsule Take 1 tablet (100 mg total) by mouth 2 (two) times a day as needed for constipation 10/22/19   Woo De Anda MD   hydrOXYzine HCL (ATARAX) 25 mg tablet Take 1 tablet (25 mg total) by mouth every 6 (six) hours as needed for anxiety 4/11/22   Diana Evans MD   levonorgestrel (MIRENA) 20 MCG/24HR IUD 1 each by Intrauterine route    Historical Provider, MD   Multiple Vitamin (Daily Vites) tablet TAKE 1  TABLET BY MOUTH EVERY DAY 8/17/22   Mile Cornelius MD   Multiple Vitamins-Minerals (Womens Multivitamin) TABS Take 1 tablet by mouth daily 8/6/21   Stormy Vazquez MD   ondansetron (ZOFRAN) 4 mg tablet Take 1 tablet (4 mg total) by mouth every 6 (six) hours 7/5/25   Ernestina Reeves MD   oxyCODONE (ROXICODONE) 5 mg immediate release tablet TAKE 1 TABLET BY MOUTH EVERY 6 HOURS AS NEEDED FOR MODERATE PAIN. MAX DAILY AMOUNT  20 MG 5/24/21   Historical Provider, MD   pantoprazole (PROTONIX) 20 mg tablet Take 2 tablets (40 mg total) by mouth daily 7/5/25   Ernestina Reeves MD   sertraline (Zoloft) 25 mg tablet Take 1 tablet (25 mg total) by mouth in the morning. 5/23/22   Diana Evans MD   sertraline (Zoloft) 50 mg tablet Take 1 tablet (50 mg total) by mouth in the morning. 5/23/22   Diana Evans MD     Allergies   Allergen Reactions    Motrin [Ibuprofen]      Hx gastric bypass       Objective :  Temp:  [98.7 °F (37.1 °C)] 98.7 °F (37.1 °C)  HR:  [71-98] 84  BP: (112-136)/(79-87) 126/84  Resp:  [16-17] 17  SpO2:  [99 %-100 %] 100 %  O2 Device: None (Room air)    Physical Exam  Constitutional:       General: She is not in acute distress.     Appearance: She is not ill-appearing, toxic-appearing or diaphoretic.     Cardiovascular:      Rate and Rhythm: Normal rate and regular rhythm.      Pulses: Normal pulses.      Heart sounds: Normal heart sounds.   Pulmonary:      Effort: Pulmonary effort is normal. No respiratory distress.      Breath sounds: Normal breath sounds.   Abdominal:      General: There is no distension.      Palpations: Abdomen is soft.      Tenderness: There is abdominal tenderness. There is no guarding.      Comments: Severe tenderness to palpation of epigastric and RUQ region, mild tenderness to palpation of LUQ region.     Musculoskeletal:         General: No swelling or tenderness.     Skin:     General: Skin is warm.      Coloration: Skin is not jaundiced or pale.  "    Neurological:      General: No focal deficit present.      Mental Status: She is alert.     Psychiatric:         Mood and Affect: Mood normal.         Behavior: Behavior normal.          Lines/Drains:            Lab Results: I have reviewed the following results:  Results from last 7 days   Lab Units 07/14/25  1239   WBC Thousand/uL 4.95   HEMOGLOBIN g/dL 12.9   HEMATOCRIT % 39.4   PLATELETS Thousands/uL 221   SEGS PCT % 59   LYMPHO PCT % 28   MONO PCT % 11   EOS PCT % 1     Results from last 7 days   Lab Units 07/14/25  1239   SODIUM mmol/L 140   POTASSIUM mmol/L 3.8   CHLORIDE mmol/L 105   CO2 mmol/L 25   BUN mg/dL 12   CREATININE mg/dL 1.04   ANION GAP mmol/L 10   CALCIUM mg/dL 8.6   ALBUMIN g/dL 3.9   TOTAL BILIRUBIN mg/dL 0.86   ALK PHOS U/L 118*   ALT U/L 29   AST U/L 70*   GLUCOSE RANDOM mg/dL 96             No results found for: \"HGBA1C\"  Results from last 7 days   Lab Units 07/14/25  1239   LACTIC ACID mmol/L 1.9       Imaging Results Review: I reviewed radiology reports from this admission including: CT abdomen/pelvis.  Other Study Results Review: No additional pertinent studies reviewed.    Administrative Statements   I have spent a total time of 55 minutes in caring for this patient on the day of the visit/encounter including Patient and family education, Documenting in the medical record, Reviewing/placing orders in the medical record (including tests, medications, and/or procedures), Obtaining or reviewing history  , and Communicating with other healthcare professionals .    ** Please Note: This note has been constructed using a voice recognition system. **         [1]   Past Medical History:  Diagnosis Date    Abnormal Pap smear of cervix     ASCUS 12/16, ASCUS cannot exclude HGIL 2/19    Anemia     Depression     hx of depression     GERD (gastroesophageal reflux disease)     resolved per pt     History of removal of ovarian cyst     Increased BMI     Shingles     with pregnancy    Subcutaneous " cyst     Varicella     had chicken pox as child/shingles with pregnancy   [2]   Past Surgical History:  Procedure Laterality Date    APPENDECTOMY      BARIATRIC SURGERY      GASTRIC BYPASS          OTHER SURGICAL HISTORY  2021    bilat breast reduction and lift    OVARIAN CYST REMOVAL         [3]   Social History  Tobacco Use    Smoking status: Former     Current packs/day: 0.00     Average packs/day: 0.5 packs/day for 9.0 years (4.5 ttl pk-yrs)     Types: Cigarettes     Start date: 3/19/2010     Quit date: 3/19/2019     Years since quittin.3    Smokeless tobacco: Never    Tobacco comments:     started to smoke again here and there    Vaping Use    Vaping status: Every Day    Substances: THC   Substance and Sexual Activity    Alcohol use: Yes     Comment: 4 shots per day    Drug use: No    Sexual activity: Yes     Partners: Male     Birth control/protection: I.U.D.   [4]   Family History  Problem Relation Name Age of Onset    No Known Problems Mother      No Known Problems Father      No Known Problems Sister      No Known Problems Brother      Cancer Maternal Grandmother          Lung    Heart failure Maternal Grandfather      No Known Problems Paternal Grandmother      No Known Problems Paternal Grandfather

## 2025-07-14 NOTE — ASSESSMENT & PLAN NOTE
Reports chronic daily alcohol use, though has worsened over the last few weeks with last drink 7/13  Patient reported to myself that she drinks approximately 3 shots of liquor daily, however reported to ED that she sometimes drinks up to 12 shots per day  Denies history of withdrawal seizures  CIWA protocol, continue PTA gabapentin  Thiamine, folate, multivitamin supplementation  Case management consult for CATCH evaluation, patient declines transfer to detox unit at this time

## 2025-07-14 NOTE — ED ATTENDING ATTESTATION
7/14/2025  I, Beth Tenorio MD, saw and evaluated the patient. I have discussed the patient with the resident/non-physician practitioner and agree with the resident's/non-physician practitioner's findings, Plan of Care, and MDM as documented in the resident's/non-physician practitioner's note, except where noted. All available labs and Radiology studies were reviewed.  I was present for key portions of any procedure(s) performed by the resident/non-physician practitioner and I was immediately available to provide assistance.       At this point I agree with the current assessment done in the Emergency Department.  I have conducted an independent evaluation of this patient a history and physical is as follows:    35-year-old female with history of alcohol use disorder, depression, GERD who presents for evaluation of upper abdominal pain.  Patient was evaluated in the emergency department 3 days ago at which time she was diagnosed with pancreatitis.  She did not wish to be admitted to the hospital at that time and signed out AGAINST MEDICAL ADVICE.  She returns today with worsening pain in her epigastric and right upper quadrant area.  She has had persistent nausea and vomiting and has been unable to tolerate any oral intake.  She has developed some diarrhea today as well.  No fevers that she knows of.  She is open to admission at this time but does not want to go to [place] for detox.  She drinks approximately 3-6 shots a day.  Last drink was yesterday.  She is only however had mild withdrawal symptoms.  She has never had seizures.    Exam: Awake, alert, no acute distress.  Head normocephalic and atraumatic.  Moist Dukas membranes.  Nose normal.  Normal conjunctiva.  Neck supple with normal range of motion.  Heart with regular rate and rhythm.  Lungs clear to auscultation bilaterally.  Abdomen soft, nondistended.  Epigastric and right upper quadrant tenderness without rebound or guarding.  Extremities with  normal range of motion without swelling.  Skin without rashes.  No focal neurologic deficits.    35-year-old female presenting for abdominal pain, vomiting.  Previously diagnosed with pancreatitis.  Differential diagnoses include not limited to pancreatitis, gastritis, PUD, obstruction, perforation, cholecystitis.  Will reevaluate with labs.  Will also obtain CT abdomen and pelvis with IV and oral contrast as patient has had history of gastric bypass surgery.  Will treat symptomatically.  Will plan for admission.    ED Course  ED Course as of 07/14/25 1404   Mon Jul 14, 2025   1313 Comprehensive metabolic panel(!)   1313 LIPASE(!): 86   1313 PREGNANCY, SERUM: Negative   1313 Lactic acid, plasma (w/reflex if result > 2.0)         Critical Care Time  Procedures

## 2025-07-14 NOTE — ED PROVIDER NOTES
Time reflects when diagnosis was documented in both MDM as applicable and the Disposition within this note       Time User Action Codes Description Comment    7/14/2025  4:15 PM Nas Nguyễn Add [R10.9] Abdominal pain     7/14/2025  4:15 PM Nas Nguyễn Add [R11.2,  R19.7] Nausea vomiting and diarrhea     7/14/2025  4:15 PM Nas Nguyễn Add [K29.70] Gastritis     7/14/2025  4:15 PM Nas Nguyễn Add [F10.90] Alcohol use disorder           ED Disposition       ED Disposition   Admit    Condition   Stable    Date/Time   Mon Jul 14, 2025  4:15 PM    Comment   Case was discussed with PARAM and the patient's admission status was agreed to be Admission Status: observation status to the service of Dr. Ness .               Assessment & Plan       Medical Decision Making  Patient seen and examined. Physical exam is notable for epigastric abdominal tenderness. Remainder of exam is within normal limits.    Differential: pancreatitis, complication of pancreatitis, gastritis, gastroenteritis, cholecystitis    Appropriate labs and imaging ordered. Pain control with morphine and zofran.    Labs notable for mildly elevated lipase, improved from prior visit. Imaging shows no acute abnormalities. This supports a diagnosis of gastritis. All results discussed with patient and family, they express understanding.     Patient is agreeable to admission. Case discussed with PARAM who accepted the patient for obs. All questions answered.      Amount and/or Complexity of Data Reviewed  Labs: ordered. Decision-making details documented in ED Course.  Radiology: ordered.    Risk  Prescription drug management.  Decision regarding hospitalization.        ED Course as of 07/14/25 1619   Mon Jul 14, 2025   1315 LIPASE(!): 86  Decreased from prior   1315 Sodium: 140   1315 Potassium: 3.8   1315 Creatinine: 1.04   1315 PREGNANCY, SERUM: Negative   1315 LACTIC ACID: 1.9   1315 WBC: 4.95   1315 Hemoglobin: 12.9   1315 Platelet Count: 221       Medications    morphine injection 2 mg (2 mg Intravenous Given 7/14/25 1503)   naloxone (NARCAN) 0.04 mg/mL syringe 0.04 mg (has no administration in time range)   ondansetron (ZOFRAN) injection 4 mg (has no administration in time range)   acetaminophen (Ofirmev) injection 1,000 mg (has no administration in time range)   pantoprazole (PROTONIX) injection 40 mg (has no administration in time range)   Famotidine (PF) (PEPCID) injection 20 mg (has no administration in time range)   thiamine tablet 100 mg (has no administration in time range)   folic acid (FOLVITE) tablet 1 mg (has no administration in time range)   multivitamin-minerals (CENTRUM) tablet 1 tablet (has no administration in time range)   multi-electrolyte (Plasmalyte-A/Isolyte-S PH 7.4/Normosol-R) IV bolus 1,000 mL (0 mL Intravenous Stopped 7/14/25 1435)   ondansetron (ZOFRAN) injection 4 mg (4 mg Intravenous Given 7/14/25 1255)   morphine injection 4 mg (4 mg Intravenous Given 7/14/25 1252)   ondansetron (ZOFRAN) injection 4 mg (4 mg Intravenous Given 7/14/25 1431)   iohexol (OMNIPAQUE) 240 MG/ML solution 50 mL (50 mL Oral Given 7/14/25 1455)   iohexol (OMNIPAQUE) 350 MG/ML injection (SINGLE-DOSE) 100 mL (100 mL Intravenous Given 7/14/25 1455)       ED Risk Strat Scores                 CIWA-Ar Score       Row Name 07/14/25 1230             CIWA-Ar    Nausea and Vomiting 2      Tactile Disturbances 0      Tremor 0      Auditory Disturbances 0      Paroxysmal Sweats 0      Visual Disturbances 0      Anxiety 2      Headache, Fullness in Head 0      Agitation 0      Orientation and Clouding of Sensorium 0      CIWA-Ar Total 4                      No data recorded                            History of Present Illness       Chief Complaint   Patient presents with    Abdominal Pain     Diagnosed pancreatitis, last drink yesterday       Past Medical History[1]   Past Surgical History[2]   Family History[3]   Social History[4]   E-Cigarette/Vaping    E-Cigarette Use Current  Every Day User       E-Cigarette/Vaping Substances    Nicotine No     THC Yes     CBD No     Flavoring No     Other No     Unknown No       I have reviewed and agree with the history as documented.     Shayla Davies is a 35 y.o. female     They presented to the emergency department on July 14, 2025. Patient presents with:  Abdominal Pain: Diagnosed pancreatitis, last drink yesterday  .    The patient states that since her last visit to the ER her pain has continued to worsen and she can no longer eat or drink without vomiting. She left AMA on 7/11 after being diagnosed with alcoholic pancreatitis. Pain is the same character and in the same location but has increased in intensity. She was unable to keep down tylenol this morning, emesis is nonbloody nonbilious. She has had 5 episodes of watery non bloody diarrhea today. Last drink was yesterday. She notes trying to cut down her alcohol intake since her last visit. She is agreeable to hospital admission at the time of this interview should it be recommended. Patient denies fever, chills, CP, SOB, headache, lightheadedness, dizziness, or any other complaint at this time.            History provided by:  Patient  Abdominal Pain  Associated symptoms: diarrhea, nausea and vomiting    Associated symptoms: no chest pain, no chills, no constipation, no cough, no dysuria, no fever, no hematuria, no shortness of breath and no sore throat        Review of Systems   Constitutional:  Negative for chills, diaphoresis and fever.   HENT:  Negative for congestion, ear pain, postnasal drip, rhinorrhea and sore throat.    Eyes:  Negative for pain and visual disturbance.   Respiratory:  Negative for cough, chest tightness and shortness of breath.    Cardiovascular:  Negative for chest pain and palpitations.   Gastrointestinal:  Positive for abdominal pain, diarrhea, nausea and vomiting. Negative for constipation.   Genitourinary:  Negative for dysuria and hematuria.    Musculoskeletal:  Negative for arthralgias and back pain.   Skin:  Negative for color change and rash.   Neurological:  Negative for dizziness, seizures, syncope, weakness, light-headedness, numbness and headaches.   All other systems reviewed and are negative.          Objective       ED Triage Vitals [07/14/25 1222]   Temperature Pulse Blood Pressure Respirations SpO2 Patient Position - Orthostatic VS   98.7 °F (37.1 °C) 98 136/87 16 99 % Lying      Temp Source Heart Rate Source BP Location FiO2 (%) Pain Score    Oral Monitor Right arm -- 10 - Worst Possible Pain      Vitals      Date and Time Temp Pulse SpO2 Resp BP Pain Score FACES Pain Rating User   07/14/25 1503 -- -- -- -- -- 10 - Worst Possible Pain -- KLB   07/14/25 1400 -- 71 100 % 17 112/79 -- -- KLB   07/14/25 1346 -- 78 100 % -- 116/79 9 -- KLB   07/14/25 1252 -- -- -- -- -- 10 - Worst Possible Pain -- KLB   07/14/25 1228 -- -- -- -- -- 10 - Worst Possible Pain -- KLB   07/14/25 1222 98.7 °F (37.1 °C) 98 99 % 16 136/87 10 - Worst Possible Pain -- EJN            Physical Exam  Constitutional:       General: She is not in acute distress.     Appearance: She is not diaphoretic.   HENT:      Head: Normocephalic and atraumatic.      Nose: No congestion or rhinorrhea.      Mouth/Throat:      Mouth: Mucous membranes are moist.      Pharynx: No oropharyngeal exudate.     Eyes:      General: No scleral icterus.      Cardiovascular:      Rate and Rhythm: Normal rate and regular rhythm.      Heart sounds: Normal heart sounds. No murmur heard.     No friction rub. No gallop.   Pulmonary:      Effort: No respiratory distress.      Breath sounds: Normal breath sounds. No wheezing, rhonchi or rales.   Abdominal:      General: Abdomen is flat. There is no distension.      Palpations: Abdomen is soft.      Tenderness: There is abdominal tenderness in the epigastric area. There is no right CVA tenderness, left CVA tenderness, guarding or rebound. Negative signs include  Garrison's sign and McBurney's sign.   Lymphadenopathy:      Cervical: No cervical adenopathy.     Skin:     General: Skin is warm and dry.      Capillary Refill: Capillary refill takes less than 2 seconds.      Findings: No rash.     Neurological:      General: No focal deficit present.      Mental Status: She is alert and oriented to person, place, and time.         Results Reviewed       Procedure Component Value Units Date/Time    Comprehensive metabolic panel [[id number]]  (Abnormal) Collected: 07/14/25 1239    Lab Status: Final result Specimen: Blood from Arm, Left Updated: 07/14/25 1313     Sodium 140 mmol/L      Potassium 3.8 mmol/L      Chloride 105 mmol/L      CO2 25 mmol/L      ANION GAP 10 mmol/L      BUN 12 mg/dL      Creatinine 1.04 mg/dL      Glucose 96 mg/dL      Calcium 8.6 mg/dL      AST 70 U/L      ALT 29 U/L      Alkaline Phosphatase 118 U/L      Total Protein 6.8 g/dL      Albumin 3.9 g/dL      Total Bilirubin 0.86 mg/dL      eGFR 69 ml/min/1.73sq m     Narrative:      [employer] Kid[employer] guidelines for Chronic Kidney Disease (CKD):     Stage 1 with normal or high GFR (GFR > 90 mL/min/1.73 square meters)    Stage 2 Mild CKD (GFR = 60-89 mL/min/1.73 square meters)    Stage 3A Moderate CKD (GFR = 45-59 mL/min/1.73 square meters)    Stage 3B Moderate CKD (GFR = 30-44 mL/min/1.73 square meters)    Stage 4 Severe CKD (GFR = 15-29 mL/min/1.73 square meters)    Stage 5 End Stage CKD (GFR <15 mL/min/1.73 square meters)  Note: GFR calculation is accurate only with a steady state creatinine    Lipase [[id number]]  (Abnormal) Collected: 07/14/25 1239    Lab Status: Final result Specimen: Blood from Arm, Left Updated: 07/14/25 1313     Lipase 86 u/L     hCG, qualitative pregnancy [[id number]]  (Normal) Collected: 07/14/25 1239    Lab Status: Final result Specimen: Blood from Arm, Left Updated: 07/14/25 1311     Preg, Serum Negative    Lactic acid, plasma (w/reflex if result > 2.0) [[id number]]   (Normal) Collected: 07/14/25 1239    Lab Status: Final result Specimen: Blood from Arm, Left Updated: 07/14/25 1307     LACTIC ACID 1.9 mmol/L     Narrative:      Result may be elevated if tourniquet was used during collection.    CBC and differential [[id number]]  (Abnormal) Collected: 07/14/25 1239    Lab Status: Final result Specimen: Blood from Arm, Left Updated: 07/14/25 1246     WBC 4.95 Thousand/uL      RBC 4.68 Million/uL      Hemoglobin 12.9 g/dL      Hematocrit 39.4 %      MCV 84 fL      MCH 27.6 pg      MCHC 32.7 g/dL      RDW 17.9 %      MPV 8.9 fL      Platelets 221 Thousands/uL      nRBC 0 /100 WBCs      Segmented % 59 %      Immature Grans % 0 %      Lymphocytes % 28 %      Monocytes % 11 %      Eosinophils Relative 1 %      Basophils Relative 1 %      Absolute Neutrophils 2.95 Thousands/µL      Absolute Immature Grans 0.01 Thousand/uL      Absolute Lymphocytes 1.38 Thousands/µL      Absolute Monocytes 0.53 Thousand/µL      Eosinophils Absolute 0.04 Thousand/µL      Basophils Absolute 0.04 Thousands/µL             CT abdomen pelvis with contrast   Final Interpretation by Fidel Odell MD (07/14 1520)      No CT evidence of acute pancreatitis or other sources of acute abdominal pain identified.      Status post gastric bypass surgery, with a dilated gastric pouch, better seen on the prior CT from 7/2/2025.         Workstation performed: [id number]             Procedures    ED Medication and Procedure Management   Prior to Admission Medications   Prescriptions Last Dose Informant Patient Reported? Taking?   Docusate Sodium (DOK) 100 MG capsule  Self No No   Sig: Take 1 tablet (100 mg total) by mouth 2 (two) times a day as needed for constipation   Multiple Vitamin (Daily Vites) tablet   No No   Sig: TAKE 1 TABLET BY MOUTH EVERY DAY   Multiple Vitamins-Minerals (Womens Multivitamin) TABS  Self No No   Sig: Take 1 tablet by mouth daily   acetaminophen (TYLENOL) 325 mg tablet  Self No No   Sig: Take 2  tablets (650 mg total) by mouth every 4 (four) hours as needed for mild pain or headaches   busPIRone (BUSPAR) 7.5 mg tablet   No No   Sig: Take 1 tablet (7.5 mg total) by mouth in the morning and 1 tablet (7.5 mg total) in the evening.   chlorhexidine (PERIDEX) 0.12 % solution  Self Yes No   Sig: PLEASE SEE ATTACHED FOR DETAILED DIRECTIONS   hydrOXYzine HCL (ATARAX) 25 mg tablet  Self No No   Sig: Take 1 tablet (25 mg total) by mouth every 6 (six) hours as needed for anxiety   levonorgestrel (MIRENA) 20 MCG/24HR IUD  Self Yes No   Si each by Intrauterine route   ondansetron (ZOFRAN) 4 mg tablet   No No   Sig: Take 1 tablet (4 mg total) by mouth every 6 (six) hours   oxyCODONE (ROXICODONE) 5 mg immediate release tablet  Self Yes No   Sig: TAKE 1 TABLET BY MOUTH EVERY 6 HOURS AS NEEDED FOR MODERATE PAIN. MAX DAILY AMOUNT  20 MG   pantoprazole (PROTONIX) 20 mg tablet   No No   Sig: Take 2 tablets (40 mg total) by mouth daily   sertraline (Zoloft) 25 mg tablet   No No   Sig: Take 1 tablet (25 mg total) by mouth in the morning.   sertraline (Zoloft) 50 mg tablet   No No   Sig: Take 1 tablet (50 mg total) by mouth in the morning.      Facility-Administered Medications: None     Patient's Medications   Discharge Prescriptions    No medications on file     No discharge procedures on file.  ED SEPSIS DOCUMENTATION   Time reflects when diagnosis was documented in both MDM as applicable and the Disposition within this note       Time User Action Codes Description Comment    2025  4:15 PM Nas Nguyễn [R10.9] Abdominal pain     2025  4:15 PM Nas Nguyễn Add [R11.2,  R19.7] Nausea vomiting and diarrhea     2025  4:15 PM Nas Nguyễn [K29.70] Gastritis     2025  4:15 PM Nas Nguyễn Add [F10.90] Alcohol use disorder                    [1]   Past Medical History:  Diagnosis Date    Abnormal Pap smear of cervix     ASCUS , ASCUS cannot exclude HGIL     Anemia     Depression     hx of  depression     GERD (gastroesophageal reflux disease)     resolved per pt     History of removal of ovarian cyst     Increased BMI     Shingles     with pregnancy    Subcutaneous cyst     Varicella     had chicken pox as child/shingles with pregnancy   [2]   Past Surgical History:  Procedure Laterality Date    APPENDECTOMY      BARIATRIC SURGERY      GASTRIC BYPASS          OTHER SURGICAL HISTORY  2021    bilat breast reduction and lift    OVARIAN CYST REMOVAL         [3]   Family History  Problem Relation Name Age of Onset    No Known Problems Mother      No Known Problems Father      No Known Problems Sister      No Known Problems Brother      Cancer Maternal Grandmother          Lung    Heart failure Maternal Grandfather      No Known Problems Paternal Grandmother      No Known Problems Paternal Grandfather     [4]   Social History  Tobacco Use    Smoking status: Former     Current packs/day: 0.00     Average packs/day: 0.5 packs/day for 9.0 years (4.5 ttl pk-yrs)     Types: Cigarettes     Start date: 3/19/2010     Quit date: 3/19/2019     Years since quittin.3    Smokeless tobacco: Never    Tobacco comments:     started to smoke again here and there    Vaping Use    Vaping status: Every Day    Substances: THC   Substance Use Topics    Alcohol use: Yes     Comment: 4 shots per day    Drug use: No        Nas Nguyễn MD  [phone]

## 2025-07-14 NOTE — ED NOTES
Call [phone] to schedule your appointment with ENT.    ER for BP >180/110 or for any worsening headache that doesn't improve with medication, sudden onset of vision changes, slurred speech, facial drooping, or for any weakness, numbness, or tingling of face or extremities.  Keep a log of your blood pressures and follow up with your primary care doctor. If numbers remain elevated, they may want to see you in clinic to discuss changing/adding you your current medication regimen.   Take your BP on the same arm each time and around the same time each day. Sit with both feet on the floor for a full 5 minutes. Your arm should be at the level of your heart when you take your pressure.   Watch the sodium in your diet. Try to stay under 2000 mg per day. Canned foods, pre-packaged/processed foods, and food from restaurants are all usually high in sodium.  Limit caffeine intake to one cup per day if possible.   Avoid over the counter decongestants (decongestants have one of these listed in the active ingredients: phenylephrine, pseudoephedrine).   Try to take 15-20 minutes every evening to do something relaxing (read a book, bath, listen to soothing music, etc). This will help with stress and BP.      Patient transported to CT     Karoline Finn RN  07/14/25 [id number]

## 2025-07-15 LAB
ALBUMIN SERPL BCG-MCNC: 3.1 G/DL (ref 3.5–5)
ALP SERPL-CCNC: 101 U/L (ref 34–104)
ALT SERPL W P-5'-P-CCNC: 24 U/L (ref 7–52)
AMPHETAMINES SERPL QL SCN: NEGATIVE
ANION GAP SERPL CALCULATED.3IONS-SCNC: 7 MMOL/L (ref 4–13)
AST SERPL W P-5'-P-CCNC: 49 U/L (ref 13–39)
ATRIAL RATE: 88 BPM
BARBITURATES UR QL: NEGATIVE
BASOPHILS # BLD AUTO: 0.05 THOUSANDS/ÂΜL (ref 0–0.1)
BASOPHILS NFR BLD AUTO: 1 % (ref 0–1)
BENZODIAZ UR QL: NEGATIVE
BILIRUB SERPL-MCNC: 0.94 MG/DL (ref 0.2–1)
BILIRUB UR QL STRIP: NEGATIVE
BUN SERPL-MCNC: 7 MG/DL (ref 5–25)
CALCIUM ALBUM COR SERPL-MCNC: 8.9 MG/DL (ref 8.3–10.1)
CALCIUM SERPL-MCNC: 8.2 MG/DL (ref 8.4–10.2)
CHLORIDE SERPL-SCNC: 104 MMOL/L (ref 96–108)
CLARITY UR: CLEAR
CO2 SERPL-SCNC: 29 MMOL/L (ref 21–32)
COCAINE UR QL: NEGATIVE
COLOR UR: YELLOW
CREAT SERPL-MCNC: 0.96 MG/DL (ref 0.6–1.3)
EOSINOPHIL # BLD AUTO: 0.13 THOUSAND/ÂΜL (ref 0–0.61)
EOSINOPHIL NFR BLD AUTO: 3 % (ref 0–6)
ERYTHROCYTE [DISTWIDTH] IN BLOOD BY AUTOMATED COUNT: 17.9 % (ref 11.6–15.1)
FENTANYL UR QL SCN: NEGATIVE
GFR SERPL CREATININE-BSD FRML MDRD: 76 ML/MIN/1.73SQ M
GLUCOSE SERPL-MCNC: 81 MG/DL (ref 65–140)
GLUCOSE UR STRIP-MCNC: NEGATIVE MG/DL
HCT VFR BLD AUTO: 35.6 % (ref 34.8–46.1)
HGB BLD-MCNC: 11 G/DL (ref 11.5–15.4)
HGB UR QL STRIP.AUTO: NEGATIVE
HYDROCODONE UR QL SCN: NEGATIVE
IMM GRANULOCYTES # BLD AUTO: 0 THOUSAND/UL (ref 0–0.2)
IMM GRANULOCYTES NFR BLD AUTO: 0 % (ref 0–2)
KETONES UR STRIP-MCNC: NEGATIVE MG/DL
LEUKOCYTE ESTERASE UR QL STRIP: NEGATIVE
LYMPHOCYTES # BLD AUTO: 2.15 THOUSANDS/ÂΜL (ref 0.6–4.47)
LYMPHOCYTES NFR BLD AUTO: 50 % (ref 14–44)
MAGNESIUM SERPL-MCNC: 2.1 MG/DL (ref 1.9–2.7)
MCH RBC QN AUTO: 27.4 PG (ref 26.8–34.3)
MCHC RBC AUTO-ENTMCNC: 30.9 G/DL (ref 31.4–37.4)
MCV RBC AUTO: 89 FL (ref 82–98)
METHADONE UR QL: NEGATIVE
MONOCYTES # BLD AUTO: 0.49 THOUSAND/ÂΜL (ref 0.17–1.22)
MONOCYTES NFR BLD AUTO: 11 % (ref 4–12)
NEUTROPHILS # BLD AUTO: 1.54 THOUSANDS/ÂΜL (ref 1.85–7.62)
NEUTS SEG NFR BLD AUTO: 35 % (ref 43–75)
NITRITE UR QL STRIP: NEGATIVE
NRBC BLD AUTO-RTO: 0 /100 WBCS
OPIATES UR QL SCN: POSITIVE
OXYCODONE+OXYMORPHONE UR QL SCN: NEGATIVE
P AXIS: 85 DEGREES
PCP UR QL: NEGATIVE
PH UR STRIP.AUTO: 6 [PH]
PLATELET # BLD AUTO: 179 THOUSANDS/UL (ref 149–390)
PMV BLD AUTO: 9.2 FL (ref 8.9–12.7)
POTASSIUM SERPL-SCNC: 3.4 MMOL/L (ref 3.5–5.3)
PR INTERVAL: 142 MS
PROT SERPL-MCNC: 5.4 G/DL (ref 6.4–8.4)
PROT UR STRIP-MCNC: NEGATIVE MG/DL
QRS AXIS: 54 DEGREES
QRSD INTERVAL: 82 MS
QT INTERVAL: 366 MS
QTC INTERVAL: 442 MS
RBC # BLD AUTO: 4.02 MILLION/UL (ref 3.81–5.12)
SODIUM SERPL-SCNC: 140 MMOL/L (ref 135–147)
SP GR UR STRIP.AUTO: <=1.005 (ref 1–1.03)
T WAVE AXIS: 60 DEGREES
THC UR QL: NEGATIVE
UROBILINOGEN UR QL STRIP.AUTO: 0.2 E.U./DL
VENTRICULAR RATE: 88 BPM
WBC # BLD AUTO: 4.36 THOUSAND/UL (ref 4.31–10.16)

## 2025-07-15 PROCEDURE — 80053 COMPREHEN METABOLIC PANEL: CPT | Performed by: PHYSICIAN ASSISTANT

## 2025-07-15 PROCEDURE — 93010 ELECTROCARDIOGRAM REPORT: CPT | Performed by: INTERNAL MEDICINE

## 2025-07-15 PROCEDURE — 99222 1ST HOSP IP/OBS MODERATE 55: CPT | Performed by: INTERNAL MEDICINE

## 2025-07-15 PROCEDURE — 85025 COMPLETE CBC W/AUTO DIFF WBC: CPT | Performed by: PHYSICIAN ASSISTANT

## 2025-07-15 PROCEDURE — 99232 SBSQ HOSP IP/OBS MODERATE 35: CPT

## 2025-07-15 PROCEDURE — 83735 ASSAY OF MAGNESIUM: CPT | Performed by: PHYSICIAN ASSISTANT

## 2025-07-15 RX ORDER — POTASSIUM CHLORIDE 1500 MG/1
20 TABLET, EXTENDED RELEASE ORAL ONCE
Status: COMPLETED | OUTPATIENT
Start: 2025-07-15 | End: 2025-07-15

## 2025-07-15 RX ADMIN — POTASSIUM CHLORIDE 20 MEQ: 1500 TABLET, EXTENDED RELEASE ORAL at 09:27

## 2025-07-15 RX ADMIN — QUETIAPINE FUMARATE 100 MG: 100 TABLET ORAL at 09:27

## 2025-07-15 RX ADMIN — SODIUM CHLORIDE, SODIUM LACTATE, POTASSIUM CHLORIDE, AND CALCIUM CHLORIDE 150 ML/HR: .6; .31; .03; .02 INJECTION, SOLUTION INTRAVENOUS at 05:36

## 2025-07-15 RX ADMIN — ALUMINUM HYDROXIDE, MAGNESIUM HYDROXIDE, AND DIMETHICONE 30 ML: 200; 20; 200 SUSPENSION ORAL at 20:15

## 2025-07-15 RX ADMIN — MORPHINE SULFATE 2 MG: 2 INJECTION, SOLUTION INTRAMUSCULAR; INTRAVENOUS at 18:14

## 2025-07-15 RX ADMIN — GABAPENTIN 800 MG: 400 CAPSULE ORAL at 20:16

## 2025-07-15 RX ADMIN — MORPHINE SULFATE 2 MG: 2 INJECTION, SOLUTION INTRAMUSCULAR; INTRAVENOUS at 22:13

## 2025-07-15 RX ADMIN — PANTOPRAZOLE SODIUM 40 MG: 40 INJECTION, POWDER, FOR SOLUTION INTRAVENOUS at 20:17

## 2025-07-15 RX ADMIN — DOXEPIN HYDROCHLORIDE 100 MG: 100 CAPSULE ORAL at 22:13

## 2025-07-15 RX ADMIN — MORPHINE SULFATE 2 MG: 2 INJECTION, SOLUTION INTRAMUSCULAR; INTRAVENOUS at 05:45

## 2025-07-15 RX ADMIN — FOLIC ACID 1 MG: 1 TABLET ORAL at 09:27

## 2025-07-15 RX ADMIN — LEVETIRACETAM 750 MG: 250 TABLET, FILM COATED ORAL at 09:27

## 2025-07-15 RX ADMIN — QUETIAPINE FUMARATE 100 MG: 100 TABLET ORAL at 17:24

## 2025-07-15 RX ADMIN — GABAPENTIN 800 MG: 400 CAPSULE ORAL at 09:27

## 2025-07-15 RX ADMIN — LEVETIRACETAM 750 MG: 250 TABLET, FILM COATED ORAL at 17:24

## 2025-07-15 RX ADMIN — OXYCODONE HYDROCHLORIDE 5 MG: 5 TABLET ORAL at 20:16

## 2025-07-15 RX ADMIN — THIAMINE HCL TAB 100 MG 100 MG: 100 TAB at 09:27

## 2025-07-15 RX ADMIN — Medication 1 TABLET: at 09:27

## 2025-07-15 RX ADMIN — MORPHINE SULFATE 2 MG: 2 INJECTION, SOLUTION INTRAMUSCULAR; INTRAVENOUS at 09:34

## 2025-07-15 RX ADMIN — GABAPENTIN 800 MG: 400 CAPSULE ORAL at 16:11

## 2025-07-15 RX ADMIN — SODIUM CHLORIDE, SODIUM LACTATE, POTASSIUM CHLORIDE, AND CALCIUM CHLORIDE 150 ML/HR: .6; .31; .03; .02 INJECTION, SOLUTION INTRAVENOUS at 14:21

## 2025-07-15 RX ADMIN — PANTOPRAZOLE SODIUM 40 MG: 40 INJECTION, POWDER, FOR SOLUTION INTRAVENOUS at 09:26

## 2025-07-15 RX ADMIN — MORPHINE SULFATE 4 MG: 4 INJECTION INTRAVENOUS at 12:35

## 2025-07-15 RX ADMIN — BUPROPION HYDROCHLORIDE 150 MG: 150 TABLET, FILM COATED, EXTENDED RELEASE ORAL at 09:27

## 2025-07-15 RX ADMIN — OXYCODONE HYDROCHLORIDE 5 MG: 5 TABLET ORAL at 16:11

## 2025-07-15 NOTE — PLAN OF CARE
Problem: PAIN - ADULT  Goal: Verbalizes/displays adequate comfort level or baseline comfort level  Description: Interventions:  - Encourage patient to monitor pain and request assistance  - Assess pain using appropriate pain scale  - Administer analgesics as ordered based on type and severity of pain and evaluate response  - Implement non-pharmacological measures as appropriate and evaluate response  - Consider cultural and social influences on pain and pain management  - Notify physician/advanced practitioner if interventions unsuccessful or patient reports new pain  - Educate patient/family on pain management process including their role and importance of  reporting pain   - Provide non-pharmacologic/complimentary pain relief interventions  Outcome: Not Progressing     Problem: SAFETY ADULT  Goal: Patient will remain free of falls  Description: INTERVENTIONS:  - Educate patient/family on patient safety including physical limitations  - Instruct patient to call for assistance with activity   - Consider consulting OT/PT to assist with strengthening/mobility based on AM PAC & JH-HLM score  - Consult OT/PT to assist with strengthening/mobility   - Keep Call bell within reach  - Keep bed low and locked with side rails adjusted as appropriate  - Keep care items and personal belongings within reach  - Initiate and maintain comfort rounds  - Make Fall Risk Sign visible to staff  - Offer Toileting every prn Hours, in advance of need  - Initiate/Maintain bed alarm  - Obtain necessary fall risk management equipment: n/a   - Apply yellow socks and bracelet for high fall risk patients  - Consider moving patient to room near nurses station  Outcome: Not Progressing     Problem: DISCHARGE PLANNING  Goal: Discharge to home or other facility with appropriate resources  Description: INTERVENTIONS:  - Identify barriers to discharge w/patient and caregiver  - Arrange for needed discharge resources and transportation as  appropriate  - Identify discharge learning needs (meds, wound care, etc.)  - Arrange for interpretive services to assist at discharge as needed  - Refer to Case Management Department for coordinating discharge planning if the patient needs post-hospital services based on physician/advanced practitioner order or complex needs related to functional status, cognitive ability, or social support system  Outcome: Not Progressing     Problem: Knowledge Deficit  Goal: Patient/family/caregiver demonstrates understanding of disease process, treatment plan, medications, and discharge instructions  Description: Complete learning assessment and assess knowledge base.  Interventions:  - Provide teaching at level of understanding  - Provide teaching via preferred learning methods  Outcome: Not Progressing

## 2025-07-16 ENCOUNTER — APPOINTMENT (OUTPATIENT)
Dept: GASTROENTEROLOGY | Facility: HOSPITAL | Age: 35
DRG: 263 | End: 2025-07-16
Payer: MEDICARE

## 2025-07-16 ENCOUNTER — ANESTHESIA EVENT (INPATIENT)
Dept: GASTROENTEROLOGY | Facility: HOSPITAL | Age: 35
DRG: 263 | End: 2025-07-16
Payer: MEDICARE

## 2025-07-16 ENCOUNTER — ANESTHESIA (INPATIENT)
Dept: GASTROENTEROLOGY | Facility: HOSPITAL | Age: 35
DRG: 263 | End: 2025-07-16
Payer: MEDICARE

## 2025-07-16 LAB
ALBUMIN SERPL BCG-MCNC: 3.2 G/DL (ref 3.5–5)
ALP SERPL-CCNC: 129 U/L (ref 34–104)
ALT SERPL W P-5'-P-CCNC: 20 U/L (ref 7–52)
ANION GAP SERPL CALCULATED.3IONS-SCNC: 9 MMOL/L (ref 4–13)
AST SERPL W P-5'-P-CCNC: 38 U/L (ref 13–39)
BILIRUB SERPL-MCNC: 0.89 MG/DL (ref 0.2–1)
BUN SERPL-MCNC: 6 MG/DL (ref 5–25)
CALCIUM ALBUM COR SERPL-MCNC: 9.1 MG/DL (ref 8.3–10.1)
CALCIUM SERPL-MCNC: 8.5 MG/DL (ref 8.4–10.2)
CHLORIDE SERPL-SCNC: 107 MMOL/L (ref 96–108)
CO2 SERPL-SCNC: 25 MMOL/L (ref 21–32)
CREAT SERPL-MCNC: 0.95 MG/DL (ref 0.6–1.3)
ERYTHROCYTE [DISTWIDTH] IN BLOOD BY AUTOMATED COUNT: 17.9 % (ref 11.6–15.1)
GFR SERPL CREATININE-BSD FRML MDRD: 77 ML/MIN/1.73SQ M
GLUCOSE P FAST SERPL-MCNC: 88 MG/DL (ref 65–99)
GLUCOSE SERPL-MCNC: 88 MG/DL (ref 65–140)
HCT VFR BLD AUTO: 39.4 % (ref 34.8–46.1)
HGB BLD-MCNC: 11.8 G/DL (ref 11.5–15.4)
MCH RBC QN AUTO: 26.9 PG (ref 26.8–34.3)
MCHC RBC AUTO-ENTMCNC: 29.9 G/DL (ref 31.4–37.4)
MCV RBC AUTO: 90 FL (ref 82–98)
PLATELET # BLD AUTO: 159 THOUSANDS/UL (ref 149–390)
PMV BLD AUTO: 9.9 FL (ref 8.9–12.7)
POTASSIUM SERPL-SCNC: 4.3 MMOL/L (ref 3.5–5.3)
PROT SERPL-MCNC: 5.9 G/DL (ref 6.4–8.4)
RBC # BLD AUTO: 4.38 MILLION/UL (ref 3.81–5.12)
SODIUM SERPL-SCNC: 141 MMOL/L (ref 135–147)
WBC # BLD AUTO: 4.65 THOUSAND/UL (ref 4.31–10.16)

## 2025-07-16 PROCEDURE — 0DBA8ZX EXCISION OF JEJUNUM, VIA NATURAL OR ARTIFICIAL OPENING ENDOSCOPIC, DIAGNOSTIC: ICD-10-PCS | Performed by: INTERNAL MEDICINE

## 2025-07-16 PROCEDURE — 99232 SBSQ HOSP IP/OBS MODERATE 35: CPT | Performed by: INTERNAL MEDICINE

## 2025-07-16 PROCEDURE — 85027 COMPLETE CBC AUTOMATED: CPT

## 2025-07-16 PROCEDURE — 88305 TISSUE EXAM BY PATHOLOGIST: CPT | Performed by: PATHOLOGY

## 2025-07-16 PROCEDURE — 0DB38ZX EXCISION OF LOWER ESOPHAGUS, VIA NATURAL OR ARTIFICIAL OPENING ENDOSCOPIC, DIAGNOSTIC: ICD-10-PCS | Performed by: INTERNAL MEDICINE

## 2025-07-16 PROCEDURE — 80053 COMPREHEN METABOLIC PANEL: CPT

## 2025-07-16 PROCEDURE — 43239 EGD BIOPSY SINGLE/MULTIPLE: CPT | Performed by: INTERNAL MEDICINE

## 2025-07-16 PROCEDURE — 0DB68ZX EXCISION OF STOMACH, VIA NATURAL OR ARTIFICIAL OPENING ENDOSCOPIC, DIAGNOSTIC: ICD-10-PCS | Performed by: INTERNAL MEDICINE

## 2025-07-16 PROCEDURE — 43247 EGD REMOVE FOREIGN BODY: CPT | Performed by: INTERNAL MEDICINE

## 2025-07-16 RX ORDER — LIDOCAINE HCL/PF 100 MG/5ML
SYRINGE (ML) INJECTION AS NEEDED
Status: DISCONTINUED | OUTPATIENT
Start: 2025-07-16 | End: 2025-07-16

## 2025-07-16 RX ORDER — PROPOFOL 10 MG/ML
INJECTION, EMULSION INTRAVENOUS AS NEEDED
Status: DISCONTINUED | OUTPATIENT
Start: 2025-07-16 | End: 2025-07-16

## 2025-07-16 RX ORDER — SODIUM CHLORIDE, SODIUM LACTATE, POTASSIUM CHLORIDE, CALCIUM CHLORIDE 600; 310; 30; 20 MG/100ML; MG/100ML; MG/100ML; MG/100ML
INJECTION, SOLUTION INTRAVENOUS CONTINUOUS PRN
Status: DISCONTINUED | OUTPATIENT
Start: 2025-07-16 | End: 2025-07-16

## 2025-07-16 RX ORDER — SODIUM CHLORIDE, SODIUM LACTATE, POTASSIUM CHLORIDE, CALCIUM CHLORIDE 600; 310; 30; 20 MG/100ML; MG/100ML; MG/100ML; MG/100ML
100 INJECTION, SOLUTION INTRAVENOUS CONTINUOUS
Status: DISCONTINUED | OUTPATIENT
Start: 2025-07-16 | End: 2025-07-16

## 2025-07-16 RX ADMIN — GABAPENTIN 800 MG: 400 CAPSULE ORAL at 21:55

## 2025-07-16 RX ADMIN — PROPOFOL 50 MG: 10 INJECTION, EMULSION INTRAVENOUS at 12:15

## 2025-07-16 RX ADMIN — DOXEPIN HYDROCHLORIDE 100 MG: 100 CAPSULE ORAL at 21:55

## 2025-07-16 RX ADMIN — THIAMINE HCL TAB 100 MG 100 MG: 100 TAB at 08:57

## 2025-07-16 RX ADMIN — SODIUM CHLORIDE, SODIUM LACTATE, POTASSIUM CHLORIDE, AND CALCIUM CHLORIDE 150 ML/HR: .6; .31; .03; .02 INJECTION, SOLUTION INTRAVENOUS at 08:56

## 2025-07-16 RX ADMIN — LEVETIRACETAM 750 MG: 250 TABLET, FILM COATED ORAL at 08:57

## 2025-07-16 RX ADMIN — LEVETIRACETAM 750 MG: 250 TABLET, FILM COATED ORAL at 18:07

## 2025-07-16 RX ADMIN — PANTOPRAZOLE SODIUM 40 MG: 40 INJECTION, POWDER, FOR SOLUTION INTRAVENOUS at 21:55

## 2025-07-16 RX ADMIN — QUETIAPINE FUMARATE 100 MG: 100 TABLET ORAL at 18:08

## 2025-07-16 RX ADMIN — BUPROPION HYDROCHLORIDE 150 MG: 150 TABLET, FILM COATED, EXTENDED RELEASE ORAL at 08:57

## 2025-07-16 RX ADMIN — GABAPENTIN 800 MG: 400 CAPSULE ORAL at 18:07

## 2025-07-16 RX ADMIN — PANTOPRAZOLE SODIUM 40 MG: 40 INJECTION, POWDER, FOR SOLUTION INTRAVENOUS at 08:57

## 2025-07-16 RX ADMIN — MORPHINE SULFATE 2 MG: 2 INJECTION, SOLUTION INTRAMUSCULAR; INTRAVENOUS at 21:55

## 2025-07-16 RX ADMIN — MORPHINE SULFATE 2 MG: 2 INJECTION, SOLUTION INTRAMUSCULAR; INTRAVENOUS at 05:16

## 2025-07-16 RX ADMIN — SODIUM CHLORIDE, SODIUM LACTATE, POTASSIUM CHLORIDE, AND CALCIUM CHLORIDE 150 ML/HR: .6; .31; .03; .02 INJECTION, SOLUTION INTRAVENOUS at 01:07

## 2025-07-16 RX ADMIN — MORPHINE SULFATE 2 MG: 2 INJECTION, SOLUTION INTRAMUSCULAR; INTRAVENOUS at 08:58

## 2025-07-16 RX ADMIN — LIDOCAINE HYDROCHLORIDE 100 MG: 20 INJECTION INTRAVENOUS at 12:12

## 2025-07-16 RX ADMIN — PROPOFOL 200 MG: 10 INJECTION, EMULSION INTRAVENOUS at 12:12

## 2025-07-16 RX ADMIN — SODIUM CHLORIDE, SODIUM LACTATE, POTASSIUM CHLORIDE, AND CALCIUM CHLORIDE 125 ML/HR: .6; .31; .03; .02 INJECTION, SOLUTION INTRAVENOUS at 13:21

## 2025-07-16 RX ADMIN — FOLIC ACID 1 MG: 1 TABLET ORAL at 08:57

## 2025-07-16 RX ADMIN — GABAPENTIN 800 MG: 400 CAPSULE ORAL at 08:57

## 2025-07-16 RX ADMIN — SODIUM CHLORIDE, SODIUM LACTATE, POTASSIUM CHLORIDE, AND CALCIUM CHLORIDE: .6; .31; .03; .02 INJECTION, SOLUTION INTRAVENOUS at 12:12

## 2025-07-16 RX ADMIN — MORPHINE SULFATE 2 MG: 2 INJECTION, SOLUTION INTRAMUSCULAR; INTRAVENOUS at 14:26

## 2025-07-16 RX ADMIN — Medication 1 TABLET: at 08:57

## 2025-07-16 RX ADMIN — MORPHINE SULFATE 2 MG: 2 INJECTION, SOLUTION INTRAMUSCULAR; INTRAVENOUS at 18:08

## 2025-07-16 RX ADMIN — QUETIAPINE FUMARATE 100 MG: 100 TABLET ORAL at 08:57

## 2025-07-16 NOTE — ASSESSMENT & PLAN NOTE
Reports chronic daily alcohol use, though has worsened over the last few weeks with last drink 7/13  Reports to SLIM she drinks about 3 shots liquor daily, reported to ED she sometimes drinks up to 12 shots daily  Denies history of withdrawal seizures    Plan  CIWA protocol, continue PTA gabapentin  Thiamine, folate, multivitamin supplementation  Case management consult for CATCH evaluation, patient declines transfer to detox unit at this time

## 2025-07-16 NOTE — ASSESSMENT & PLAN NOTE
Patient presents with nausea, vomiting, epigastric pain for 2 weeks.    Recently admitted to ED with pancreatitis but left AMA.  CT a/p showed no evidence of acute pancreatitis. Lipase 86 (improved from 192 on 7/11)  US RUQ with no gallstones  Differentials: Gastritis versus marginal ulcer due to previous bariatric surgery versus biliary colic    Plan  EGD today  Continue Protonix 40 twice daily  Continue IV fluids  Pain management, antiemetics, supportive care  GI on board, appreciate recommendations

## 2025-07-16 NOTE — UTILIZATION REVIEW
Was OBSERVATION 7/14/25 @ 1616 converted to INPATIENT admission 7/16/25 @ 1148 due to continued stay required to care for patient with Epigastric pain requiring pain management and EGD today.     Initial Clinical Review    Admission: Date/Time/Statement:   Admission Orders (From admission, onward)       Ordered        07/16/25 1148  INPATIENT ADMISSION  Once            07/14/25 1616  Place in Observation  Once                          Orders Placed This Encounter   Procedures    INPATIENT ADMISSION     Standing Status:   Standing     Number of Occurrences:   1     Level of Care:   Med Surg [16]     Estimated length of stay:   More than 2 Midnights     Certification:   I certify that inpatient services are medically necessary for this patient for a duration of greater than two midnights. See H&P and MD Progress Notes for additional information about the patient's course of treatment.     ED Arrival Information       Expected   -    Arrival   7/14/2025 12:17    Acuity   Urgent              Means of arrival   Walk-In    Escorted by   Self    Service   Hospitalist    Admission type   Emergency              Arrival complaint   Abdominal Pain             Chief Complaint   Patient presents with    Abdominal Pain     Diagnosed pancreatitis, last drink yesterday       Initial Presentation: 35 y.o. female with PMH of Alcohol use disorder, Bipolar disorder, Depression, Bariatric surgery, Nicotine dependence and prior Methamphetamine use, who presented from home to [facility] ED. Admitted as Observation for evaluation and treatment of Epigastric pain.      Presented w/ persistent epigastric pain and nausea/vomiting, ongoing for the last 2 weeks. Pt reports constant, severe, 10/10 epigastric pain with some radiation to RUQ. Patient reports nausea with vomiting daily, denies hematemesis or coffee-ground emesis or NSAID use. Reports chronic daily alcohol use 3 shots of liquor daily, though has worsened over the last few  "weeks with last drink 7/13. On exam, Severe tenderness to palpation of epigastric and RUQ region, mild tenderness to palpation of LUQ region. CIWA score 1. Labs Lipase 86. AST 70, Alk phos 118. UDS + OpiateCT a/p showed no evidence of acute pancreatitis. Likely due to possible gastritis versus pancreatitis with continued alcohol use contributing. Please see below med list for meds given in the ED.       Plan: Aggressive IV fluids, Trial CLD, Pain control and antiemetics. IV PPI BID, Avoid NSAIDs. Monitor for alcohol withdrawal, Continue PTA Gabapentin, Thiamine, Folate, Multivitamin. GI consulted.    Date: 7/15/25   Day 2: Remains on observation.   Patient reports ongoing epigastric/right-sided abdominal pain and nausea. Pain scale 7/10. Reports no vomiting since admission but reports she is \"gagging.\" Tolerating clear liquid diet, does not feel she could tolerate more than this at this time.On exam, abd tenderness to RUQ and epigastric area.  CIWA score 1. Labs: K 3.4, AST 49, Alk phos 101, T protein 5.4. Plan: See below GI plans.   GI consult: Epigastric abdominal pain/nausea/vomiting: Differential includes resolving pancreatitis versus gastritis/marginal ulcer versus biliary colic. Elevated liver enzymes:  improving.  Mixed pattern, likely alcoholic hepatitis. Plan: EGD tomorrow, CLD for now, NPO after MN. IV PPI BID, Start Protonix BID, IV fluids, Antiemetics and pain control. Monitor signs of withdrawal, Continue thiamine, folic acid, multivitamin supplementation     Date: 7/16/25 Day 3. BED STATUS CHANGED TO INPATIENT  Pt's still has abd pain 10/10 this am. On exam, abdominal tenderness in the right upper quadrant and epigastric area present. Labs: AST 38, Alk phos 129, T protein 5.9. Plan: NPO for EGD today, IV fluids, Protonix BID, Pain control and antiemetics. Monitor for alcohol withdrawal, continue Thiamine, Folate and multivitamins.     Certification Statement: The patient will continue to require " additional inpatient hospital stay due to abdominal pain, nausea, vomiting, possible EGD     Date: 7/17/25 Assessment/Plan:  Patient reports ongoing upper abdominal pain especially in RUQ. Reports nausea and headache. Reports vomiting bile last night. Minimal po intake. EGD on 7/16  showed mild erythematous mucosa in the gastric pouch, biopsies pending. Small amount of food was noted in the gastric pouch, possibly due to element of gastroparesis. Plan: Surgery consulted, RUQ US ordered to determine if gallstones could be contributing to symptoms. See below Surgery plans.      Surgery: Epigastric pain. On exam, Abd soft, nondistended, tender to palpation of epigastric and right upper quadrant, normal active bowel sounds. Positive Garrison sign. Plan: CLD, NPO at MN for possible lap jessica tomorrow. Pain control, Antiemetics,     Certification Statement: The patient will continue to require additional inpatient hospital stay due to abdominal pain, general surgery evaluation     ED Treatment-Medication Administration from 07/14/2025 1217 to 07/14/2025 2009         Date/Time Order Dose Route Action     07/14/2025 1248 multi-electrolyte (Plasmalyte-A/Isolyte-S PH 7.4/Normosol-R) IV bolus 1,000 mL 1,000 mL Intravenous New Bag     07/14/2025 1255 ondansetron (ZOFRAN) injection 4 mg 4 mg Intravenous Given     07/14/2025 1252 morphine injection 4 mg 4 mg Intravenous Given     07/14/2025 1431 ondansetron (ZOFRAN) injection 4 mg 4 mg Intravenous Given     07/14/2025 1503 morphine injection 2 mg 2 mg Intravenous Given     07/14/2025 1712 morphine injection 2 mg 2 mg Intravenous Given     07/14/2025 1455 iohexol (OMNIPAQUE) 240 MG/ML solution 50 mL 50 mL Oral Given     07/14/2025 1455 iohexol (OMNIPAQUE) 350 MG/ML injection (SINGLE-DOSE) 100 mL 100 mL Intravenous Given     07/14/2025 1632 pantoprazole (PROTONIX) injection 40 mg 40 mg Intravenous Given     07/14/2025 1636 Famotidine (PF) (PEPCID) injection 20 mg 20 mg Intravenous  Given     07/14/2025 1630 thiamine tablet 100 mg 100 mg Oral Given     07/14/2025 1630 folic acid (FOLVITE) tablet 1 mg 1 mg Oral Given     07/14/2025 1756 gabapentin (NEURONTIN) capsule 800 mg 800 mg Oral Given     07/14/2025 1756 levETIRAcetam (KEPPRA) tablet 750 mg 750 mg Oral Given     07/14/2025 1756 QUEtiapine (SEROquel) tablet 100 mg 100 mg Oral Given     07/14/2025 1732 lactated ringers infusion 150 mL/hr Intravenous New Bag            Scheduled Medications:  buPROPion, 150 mg, Oral, QAM  doxepin, 100 mg, Oral, HS  folic acid, 1 mg, Oral, Daily  gabapentin, 800 mg, Oral, TID  levETIRAcetam, 750 mg, Oral, BID  multivitamin-minerals, 1 tablet, Oral, Daily  pantoprazole, 40 mg, Intravenous, Q12H SANDRO  QUEtiapine, 100 mg, Oral, BID  thiamine, 100 mg, Oral, Daily    Continuous IV Infusions:  lactated ringers infusion  Rate: 150 mL/hr Dose: 150 mL/hr  Freq: Continuous Route: IV  Indications of Use: IV Hydration  Last Dose: 150 mL/hr (07/16/25 0107)  Start: 07/14/25 1715 End: 07/16/25 1731  lactated ringers infusion  Rate: 100 mL/hr Dose: 100 mL/hr  Freq: Continuous Route: IV  Last Dose: Stopped (07/16/25 1809)  Start: 07/16/25 1115 End: 07/16/25 1712    PRN Meds:  acetaminophen, 1,000 mg, Intravenous, Q8H PRN  aluminum-magnesium hydroxide-simethicone, 30 mL, Oral, Q6H PRN  hydrOXYzine HCL, 25 mg, Oral, Q6H PRN  morphine injection, 2 mg, Intravenous, Q3H PRN- given x2 7/14, x4 7/15, x 5 7/16 and x2 7/14  morphine injection, 4 mg, Intravenous, Q3H PRN- given x1 7/15  naloxone, 0.04 mg, Intravenous, Q1MIN PRN  ondansetron, 4 mg, Intravenous, Q4H PRN  oxyCODONE, 5 mg, Oral, Q4H PRN- given x2 7/15  trimethobenzamide, 200 mg, Intramuscular, Q6H PRN  morphine injection 2 mg  Dose: 2 mg  Freq: Every 2 hour PRN Route: IV  PRN Reasons: breakthrough pain,severe pain  Start: 07/14/25 1436 End: 07/14/25 1715- given x2 7/14       ED Triage Vitals [07/14/25 1222]   Temperature Pulse Respirations Blood Pressure SpO2 Pain Score    98.7 °F (37.1 °C) 98 16 136/87 99 % 10 - Worst Possible Pain     Weight (last 2 days)       Date/Time Weight    07/16/25 1109 85.3 (188)            Vital Signs (last 3 days)       Date/Time Temp Pulse Resp BP MAP (mmHg) SpO2 O2 Device Patient Position - Orthostatic VS Lien Coma Scale Score CIWA-Ar Total Pain    07/17/25 1133 -- -- -- -- -- -- -- -- -- -- 9    07/17/25 0900 -- -- -- -- -- -- None (Room air) -- 15 -- No Pain    07/17/25 0815 98.6 °F (37 °C) 83 16 106/56 -- 100 % None (Room air) -- -- -- 8    07/16/25 2254 98 °F (36.7 °C) 99 15 103/58 -- 99 % None (Room air) Lying -- -- --    07/16/25 2155 -- -- -- -- -- -- -- -- -- -- 8    07/16/25 2100 -- -- -- -- -- -- -- -- 15 -- --    07/16/25 1808 -- -- -- -- -- -- -- -- -- -- 8    07/16/25 1530 97.9 °F (36.6 °C) 97 12 110/78 -- 100 % None (Room air) Lying -- -- --    07/16/25 1426 -- -- -- -- -- -- -- -- -- -- 8    07/16/25 1310 97.6 °F (36.4 °C) 96 12 106/66 80 100 % None (Room air) Lying -- -- --    07/16/25 1240 -- 95 12 108/62 -- 99 % None (Room air) -- -- -- No Pain    07/16/25 1230 -- 100 17 89/53 -- 97 % None (Room air) -- -- -- --    07/16/25 1220 97.6 °F (36.4 °C) 88 16 87/53 -- 97 % None (Room air) -- -- -- --    07/16/25 1109 97.8 °F (36.6 °C) 85 18 116/69 -- 98 % None (Room air) -- -- -- --    07/16/25 0900 -- -- -- -- -- -- None (Room air) -- 15 -- No Pain    07/16/25 0858 -- -- -- -- -- -- -- -- -- -- 8    07/16/25 0721 98 °F (36.7 °C) 74 9 131/82 -- 99 % None (Room air) Lying -- -- --    07/16/25 0516 -- -- -- -- -- -- -- -- -- -- 10 - Worst Possible Pain    07/15/25 2213 -- -- -- -- -- -- -- -- -- -- 9    07/15/25 2144 97.7 °F (36.5 °C) 96 18 116/76 98 100 % None (Room air) Lying -- -- --    07/15/25 2016 -- -- -- -- -- -- -- -- 15 -- 6    07/15/25 1932 97.8 °F (36.6 °C) 104 18 108/68 84 98 % None (Room air) Lying -- -- --    07/15/25 1814 -- -- -- -- -- -- -- -- -- -- 8    07/15/25 1611 -- -- -- -- -- -- -- -- -- -- 7    07/15/25 1547 97.6  °F (36.4 °C) 81 16 96/71 80 100 % None (Room air) Lying -- -- --    07/15/25 1235 97.5 °F (36.4 °C) 83 14 114/72 90 100 % None (Room air) Lying -- -- 9    07/15/25 0934 -- -- -- -- -- -- -- -- -- -- 7    07/15/25 0815 -- -- -- -- -- -- -- -- 15 -- 7    07/15/25 0711 97.5 °F (36.4 °C) 65 14 101/65 -- 100 % None (Room air) Lying -- -- --    07/15/25 0545 -- -- -- -- -- -- -- -- -- -- 8    07/15/25 0543 98.4 °F (36.9 °C) 67 16 106/77 87 97 % None (Room air) Lying -- -- --    07/15/25 0454 97.8 °F (36.6 °C) 56 18 109/85 -- 100 % None (Room air) Lying -- 1 --    07/14/25 2340 -- -- -- -- -- -- -- -- -- -- 8 07/14/25 2334 -- 66 -- 112/73 -- -- -- Lying -- 1 --    07/14/25 2316 97.5 °F (36.4 °C) 60 18 105/68 -- 100 % None (Room air) Lying -- -- --    07/14/25 2115 -- -- -- -- -- -- -- -- -- -- 5    07/14/25 2100 -- -- -- -- -- -- -- -- 15 -- --    07/14/25 2015 97.5 °F (36.4 °C) 80 18 108/66 -- 97 % None (Room air) Lying -- 1 10 - Worst Possible Pain    07/14/25 1951 -- 76 18 118/67 -- 98 % None (Room air) Lying -- -- --    07/14/25 1712 -- -- -- -- -- -- -- -- -- -- 9    07/14/25 1642 -- -- -- -- -- -- -- -- -- -- 10 - Worst Possible Pain    07/14/25 1640 -- 84 -- 126/84 -- -- -- -- -- 1 --    07/14/25 1503 -- -- -- -- -- -- -- -- -- -- 10 - Worst Possible Pain    07/14/25 1400 -- 71 17 112/79 92 100 % None (Room air) Lying -- -- --    07/14/25 1346 -- 78 -- 116/79 95 100 % None (Room air) Lying -- -- 9    07/14/25 1252 -- -- -- -- -- -- -- -- -- -- 10 - Worst Possible Pain    07/14/25 1230 -- -- -- -- -- -- -- -- -- 4 --    07/14/25 1228 -- -- -- -- -- -- -- -- -- -- 10 - Worst Possible Pain    07/14/25 1227 -- -- -- -- -- -- -- -- 15 -- --    07/14/25 1222 98.7 °F (37.1 °C) 98 16 136/87 -- 99 % None (Room air) Lying -- -- 10 - Worst Possible Pain           CIWA-Ar Score       Row Name 07/15/25 0454 07/14/25 0769 07/14/25 2015       CIWA-Ar    Nausea and Vomiting 0 0 0    Tactile Disturbances 0 0 0    Tremor 0 0 0     Auditory Disturbances 0 0 0    Paroxysmal Sweats 0 0 0    Visual Disturbances 0 0 0    Anxiety 0 0 0    Headache, Fullness in Head 0 0 0    Agitation 0 0 0    Orientation and Clouding of Sensorium 1 1 1    CIWA-Ar Total 1 1 1      Row Name 07/14/25 1640             CIWA-Ar    /84      Pulse 84      Nausea and Vomiting 0      Tactile Disturbances 0      Tremor 0      Auditory Disturbances 0      Paroxysmal Sweats 0      Visual Disturbances 0      Anxiety 1      Headache, Fullness in Head 0      Agitation 0      Orientation and Clouding of Sensorium 0      CIWA-Ar Total 1                      Pertinent Labs/Diagnostic Test Results:   Radiology:  CT abdomen pelvis with contrast   Final Interpretation by Fidel Odell MD (07/14 1520)      No CT evidence of acute pancreatitis or other sources of acute abdominal pain identified.      Status post gastric bypass surgery, with a dilated gastric pouch, better seen on the prior CT from 7/2/2025.         Workstation performed: [id number]         US right upper quadrant    (Results Pending)     Results from last 7 days   Lab Units 07/16/25  0456 07/15/25  0500 07/14/25 1239 07/11/25  1624   WBC Thousand/uL 4.65 4.36 4.95 7.04   HEMOGLOBIN g/dL 11.8 11.0* 12.9 12.0   HEMATOCRIT % 39.4 35.6 39.4 37.4   PLATELETS Thousands/uL 159 179 221 244   TOTAL NEUT ABS Thousands/µL  --  1.54* 2.95 2.99         Results from last 7 days   Lab Units 07/16/25  0540 07/15/25  0500 07/14/25 1239 07/11/25  1624   SODIUM mmol/L 141 140 140 141   POTASSIUM mmol/L 4.3 3.4* 3.8 3.4*   CHLORIDE mmol/L 107 104 105 108   CO2 mmol/L 25 29 25 24   ANION GAP mmol/L 9 7 10 9   BUN mg/dL 6 7 12 9   CREATININE mg/dL 0.95 0.96 1.04 1.11   EGFR ml/min/1.73sq m 77 76 69 64   CALCIUM mg/dL 8.5 8.2* 8.6 8.3*   MAGNESIUM mg/dL  --  2.1  --   --      Results from last 7 days   Lab Units 07/16/25  0540 07/15/25  0500 07/14/25  1239 07/11/25  1624   AST U/L 38 49* 70* 33   ALT U/L 20 24 29 19   ALK PHOS U/L 129*  101 118* 113*   TOTAL PROTEIN g/dL 5.9* 5.4* 6.8 6.9   ALBUMIN g/dL 3.2* 3.1* 3.9 3.9   TOTAL BILIRUBIN mg/dL 0.89 0.94 0.86 0.41         Results from last 7 days   Lab Units 07/16/25  0540 07/15/25  0500 07/14/25  1239 07/11/25  1624   GLUCOSE RANDOM mg/dL 88 81 96 115     Results from last 7 days   Lab Units 07/11/25  1624   HS TNI 0HR ng/L <2     Results from last 7 days   Lab Units 07/14/25  1239   LACTIC ACID mmol/L 1.9     Results from last 7 days   Lab Units 07/14/25  1239 07/11/25  1624   LIPASE u/L 86* 192*                 Results from last 7 days   Lab Units 07/14/25  2337   CLARITY UA  Clear   COLOR UA  Yellow   SPEC GRAV UA  <=1.005   PH UA  6.0   GLUCOSE UA mg/dl Negative   KETONES UA mg/dl Negative   BLOOD UA  Negative   PROTEIN UA mg/dl Negative   NITRITE UA  Negative   BILIRUBIN UA  Negative   UROBILINOGEN UA E.U./dl 0.2   LEUKOCYTES UA  Negative         Results from last 7 days   Lab Units 07/14/25  2337   AMPH/METH  Negative   BARBITURATE UR  Negative   BENZODIAZEPINE UR  Negative   COCAINE UR  Negative   METHADONE URINE  Negative   OPIATE UR  Positive*   PCP UR  Negative   THC UR  Negative       Past Medical History[1]  Present on Admission:   Nicotine dependence      Admitting Diagnosis: Gastritis [K29.70]  Epigastric pain [R10.13]  Abdominal pain [R10.9]  Nausea vomiting and diarrhea [R11.2, R19.7]  Alcohol use disorder [F10.90]  Age/Sex: 35 y.o. female    Network Utilization Review Department  ATTENTION: Please call with any questions or concerns to [phone] and carefully listen to the prompts so that you are directed to the right person. All voicemails are confidential.   For Discharge needs, contact Care Management DC Support Team at [phone] opt. [phone]  Send all requests for admission clinical reviews, approved or denied determinations and any other requests to dedicated fax number below belonging to the campus where the patient is receiving treatment. List of dedicated fax numbers for  the Facilities:  FACILITY NAME UR FAX NUMBER   ADMISSION DENIALS (Administrative/Medical Necessity) [phone]   DISCHARGE SUPPORT TEAM (NETWORK) [phone]   PARENT CHILD HEALTH (Maternity/NICU/Pediatrics) [phone]   [facility] [phone]   [facility] [phone]   [facility] [phone]   [facility] [phone]   [facility] [phone]   [facility] [phone]   [facility] [phone]   [facility] [phone]   [facility] [phone]   [facility] [phone]   [facility] [phone]   [facility] [phone]              [1]   Past Medical History:  Diagnosis Date    Abnormal Pap smear of cervix     ASCUS 12/16, ASCUS cannot exclude HGIL 2/19    Anemia     Depression     hx of depression     GERD (gastroesophageal reflux disease)     resolved per pt     History of removal of ovarian cyst     Increased BMI     Shingles     with pregnancy    Subcutaneous cyst     Varicella     had chicken pox as child/shingles with pregnancy

## 2025-07-16 NOTE — PLAN OF CARE
Problem: PAIN - ADULT  Goal: Verbalizes/displays adequate comfort level or baseline comfort level  Description: Interventions:  - Encourage patient to monitor pain and request assistance  - Assess pain using appropriate pain scale  - Administer analgesics as ordered based on type and severity of pain and evaluate response  - Implement non-pharmacological measures as appropriate and evaluate response  - Consider cultural and social influences on pain and pain management  - Notify physician/advanced practitioner if interventions unsuccessful or patient reports new pain  - Educate patient/family on pain management process including their role and importance of  reporting pain   - Provide non-pharmacologic/complimentary pain relief interventions  Outcome: Progressing     Problem: INFECTION - ADULT  Goal: Absence or prevention of progression during hospitalization  Description: INTERVENTIONS:  - Assess and monitor for signs and symptoms of infection  - Monitor lab/diagnostic results  - Monitor all insertion sites, i.e. indwelling lines, tubes, and drains  - Monitor endotracheal if appropriate and nasal secretions for changes in amount and color  - [place] appropriate cooling/warming therapies per order  - Administer medications as ordered  - Instruct and encourage patient and family to use good hand hygiene technique  - Identify and instruct in appropriate isolation precautions for identified infection/condition  Outcome: Progressing  Goal: Absence of fever/infection during neutropenic period  Description: INTERVENTIONS:  - Monitor WBC  - Perform strict hand hygiene  - Limit to healthy visitors only  - No plants, dried, fresh or silk flowers with garcia in patient room  Outcome: Progressing     Problem: SAFETY ADULT  Goal: Patient will remain free of falls  Description: INTERVENTIONS:  - Educate patient/family on patient safety including physical limitations  - Instruct patient to call for assistance with activity   -  Consider consulting OT/PT to assist with strengthening/mobility based on AM PAC & JH-HLM score  - Consult OT/PT to assist with strengthening/mobility   - Keep Call bell within reach  - Keep bed low and locked with side rails adjusted as appropriate  - Keep care items and personal belongings within reach  - Initiate and maintain comfort rounds  - Make Fall Risk Sign visible to staff  - Offer Toileting every  Hours, in advance of need  - Initiate/Maintain alarm  - Obtain necessary fall risk management equipment:   - Apply yellow socks and bracelet for high fall risk patients  - Consider moving patient to room near nurses station  Outcome: Progressing  Goal: Maintain or return to baseline ADL function  Description: INTERVENTIONS:  -  Assess patient's ability to carry out ADLs; assess patient's baseline for ADL function and identify physical deficits which impact ability to perform ADLs (bathing, care of mouth/teeth, toileting, grooming, dressing, etc.)  - Assess/evaluate cause of self-care deficits   - Assess range of motion  - Assess patient's mobility; develop plan if impaired  - Assess patient's need for assistive devices and provide as appropriate  - Encourage maximum independence but intervene and supervise when necessary  - Involve family in performance of ADLs  - Assess for home care needs following discharge   - Consider OT consult to assist with ADL evaluation and planning for discharge  - Provide patient education as appropriate  - Monitor functional capacity and physical performance, use of AM PAC & JH-HLM   - Monitor gait, balance and fatigue with ambulation    Outcome: Progressing  Goal: Maintains/Returns to pre admission functional level  Description: INTERVENTIONS:  - Perform AM-PAC 6 Click Basic Mobility/ Daily Activity assessment daily.  - Set and communicate daily mobility goal to care team and patient/family/caregiver.   - Collaborate with rehabilitation services on mobility goals if consulted  -  Perform Range of Motion  times a day.  - Reposition patient every  hours.  - Dangle patient  times a day  - Stand patient  times a day  - Ambulate patient  times a day  - Out of bed to chair  times a day   - Out of bed for meal times a day  - Out of bed for toileting  - Record patient progress and toleration of activity level   Outcome: Progressing     Problem: DISCHARGE PLANNING  Goal: Discharge to home or other facility with appropriate resources  Description: INTERVENTIONS:  - Identify barriers to discharge w/patient and caregiver  - Arrange for needed discharge resources and transportation as appropriate  - Identify discharge learning needs (meds, wound care, etc.)  - Arrange for interpretive services to assist at discharge as needed  - Refer to Case Management Department for coordinating discharge planning if the patient needs post-hospital services based on physician/advanced practitioner order or complex needs related to functional status, cognitive ability, or social support system  Outcome: Progressing     Problem: Knowledge Deficit  Goal: Patient/family/caregiver demonstrates understanding of disease process, treatment plan, medications, and discharge instructions  Description: Complete learning assessment and assess knowledge base.  Interventions:  - Provide teaching at level of understanding  - Provide teaching via preferred learning methods  Outcome: Progressing

## 2025-07-16 NOTE — PLAN OF CARE
Problem: PAIN - ADULT  Goal: Verbalizes/displays adequate comfort level or baseline comfort level  Description: Interventions:  - Encourage patient to monitor pain and request assistance  - Assess pain using appropriate pain scale  - Administer analgesics as ordered based on type and severity of pain and evaluate response  - Implement non-pharmacological measures as appropriate and evaluate response  - Consider cultural and social influences on pain and pain management  - Notify physician/advanced practitioner if interventions unsuccessful or patient reports new pain  - Educate patient/family on pain management process including their role and importance of  reporting pain   - Provide non-pharmacologic/complimentary pain relief interventions  7/16/2025 0605 by Taylor Garcia RN  Outcome: Progressing  7/15/2025 2029 by Taylor Garcia, RN  Outcome: Progressing

## 2025-07-16 NOTE — ANESTHESIA PREPROCEDURE EVALUATION
Procedure:  EGD    Relevant Problems   ANESTHESIA (within normal limits)      MUSCULOSKELETAL   (+) Chronic bilateral thoracic back pain      NEURO/PSYCH   (+) Chronic bilateral thoracic back pain   (+) Recurrent major depressive disorder (HCC)      Behavioral Health   (+) Alcohol use disorder   (+) Bipolar disorder (HCC)      Surgery/Wound/Pain   (+) Epigastric pain   (+) History of bariatric surgery        Physical Exam    Airway     Mallampati score: III  TM Distance: >3 FB  Neck ROM: full      Cardiovascular      Dental   Comment: Partial upper plate; denies loose teeth     Pulmonary      Neurological    She appears awake, alert and oriented x3.      Other Findings  post-pubertal.      Anesthesia Plan  ASA Score- 2     Anesthesia Type- IV sedation with anesthesia with ASA Monitors.         Additional Monitors:     Airway Plan: natural airway.           Plan Factors-Exercise tolerance (METS): >4 METS.    Chart reviewed. EKG reviewed.  Existing labs reviewed. Patient summary reviewed.    Patient is not a current smoker.              Induction- intravenous.    Postoperative Plan- .   Monitoring Plan - Monitoring plan - standard ASA monitoring      Perioperative Resuscitation Plan - Level 1 - Full Code.       Informed Consent- Anesthetic plan and risks discussed with patient.  I personally reviewed this patient with the CRNA. Discussed and agreed on the Anesthesia Plan with the CRNA..      NPO Status:  Vitals Value Taken Time   Date of last liquid 07/16/25 07/16/25 11:08   Time of last liquid 0800 07/16/25 11:08   Date of last solid 07/14/25 07/16/25 11:08   Time of last solid 2100 07/16/25 11:08

## 2025-07-16 NOTE — ANESTHESIA POSTPROCEDURE EVALUATION
Post-Op Assessment Note    CV Status:  Stable  Pain Score: 0    Pain management: adequate       Mental Status:  Sleepy and arousable   Hydration Status:  Stable   PONV Controlled:  Controlled   Airway Patency:  Patent     Post Op Vitals Reviewed: Yes    No anethesia notable event occurred.    Staff: CRNA           Last Filed PACU Vitals:  Vitals Value Taken Time   Temp     Pulse 97    BP 96/55    Resp 17    SpO2 97

## 2025-07-17 ENCOUNTER — APPOINTMENT (INPATIENT)
Dept: ULTRASOUND IMAGING | Facility: HOSPITAL | Age: 35
DRG: 263 | End: 2025-07-17
Payer: MEDICARE

## 2025-07-17 PROCEDURE — 76705 ECHO EXAM OF ABDOMEN: CPT

## 2025-07-17 PROCEDURE — 99232 SBSQ HOSP IP/OBS MODERATE 35: CPT | Performed by: INTERNAL MEDICINE

## 2025-07-17 PROCEDURE — 99255 IP/OBS CONSLTJ NEW/EST HI 80: CPT

## 2025-07-17 PROCEDURE — 99232 SBSQ HOSP IP/OBS MODERATE 35: CPT | Performed by: PHYSICIAN ASSISTANT

## 2025-07-17 RX ADMIN — MORPHINE SULFATE 2 MG: 2 INJECTION, SOLUTION INTRAMUSCULAR; INTRAVENOUS at 08:15

## 2025-07-17 RX ADMIN — MORPHINE SULFATE 2 MG: 2 INJECTION, SOLUTION INTRAMUSCULAR; INTRAVENOUS at 23:16

## 2025-07-17 RX ADMIN — MORPHINE SULFATE 2 MG: 2 INJECTION, SOLUTION INTRAMUSCULAR; INTRAVENOUS at 11:33

## 2025-07-17 RX ADMIN — PANTOPRAZOLE SODIUM 40 MG: 40 INJECTION, POWDER, FOR SOLUTION INTRAVENOUS at 20:18

## 2025-07-17 RX ADMIN — QUETIAPINE FUMARATE 100 MG: 100 TABLET ORAL at 08:15

## 2025-07-17 RX ADMIN — MORPHINE SULFATE 2 MG: 2 INJECTION, SOLUTION INTRAMUSCULAR; INTRAVENOUS at 15:46

## 2025-07-17 RX ADMIN — FOLIC ACID 1 MG: 1 TABLET ORAL at 08:15

## 2025-07-17 RX ADMIN — PANTOPRAZOLE SODIUM 40 MG: 40 INJECTION, POWDER, FOR SOLUTION INTRAVENOUS at 08:15

## 2025-07-17 RX ADMIN — DOXEPIN HYDROCHLORIDE 100 MG: 100 CAPSULE ORAL at 21:44

## 2025-07-17 RX ADMIN — LEVETIRACETAM 750 MG: 250 TABLET, FILM COATED ORAL at 17:08

## 2025-07-17 RX ADMIN — GABAPENTIN 800 MG: 400 CAPSULE ORAL at 15:46

## 2025-07-17 RX ADMIN — OXYCODONE HYDROCHLORIDE 5 MG: 5 TABLET ORAL at 21:46

## 2025-07-17 RX ADMIN — MORPHINE SULFATE 2 MG: 2 INJECTION, SOLUTION INTRAMUSCULAR; INTRAVENOUS at 20:18

## 2025-07-17 RX ADMIN — LEVETIRACETAM 750 MG: 250 TABLET, FILM COATED ORAL at 08:15

## 2025-07-17 RX ADMIN — BUPROPION HYDROCHLORIDE 150 MG: 150 TABLET, FILM COATED, EXTENDED RELEASE ORAL at 08:16

## 2025-07-17 RX ADMIN — Medication 1 TABLET: at 08:15

## 2025-07-17 RX ADMIN — GABAPENTIN 800 MG: 400 CAPSULE ORAL at 08:15

## 2025-07-17 RX ADMIN — GABAPENTIN 800 MG: 400 CAPSULE ORAL at 20:18

## 2025-07-17 RX ADMIN — QUETIAPINE FUMARATE 100 MG: 100 TABLET ORAL at 17:08

## 2025-07-17 RX ADMIN — THIAMINE HCL TAB 100 MG 100 MG: 100 TAB at 08:15

## 2025-07-17 NOTE — PLAN OF CARE
Problem: PAIN - ADULT  Goal: Verbalizes/displays adequate comfort level or baseline comfort level  Description: Interventions:  - Encourage patient to monitor pain and request assistance  - Assess pain using appropriate pain scale  - Administer analgesics as ordered based on type and severity of pain and evaluate response  - Implement non-pharmacological measures as appropriate and evaluate response  - Consider cultural and social influences on pain and pain management  - Notify physician/advanced practitioner if interventions unsuccessful or patient reports new pain  - Educate patient/family on pain management process including their role and importance of  reporting pain   - Provide non-pharmacologic/complimentary pain relief interventions  Outcome: Progressing     Problem: INFECTION - ADULT  Goal: Absence or prevention of progression during hospitalization  Description: INTERVENTIONS:  - Assess and monitor for signs and symptoms of infection  - Monitor lab/diagnostic results  - Monitor all insertion sites, i.e. indwelling lines, tubes, and drains  - Monitor endotracheal if appropriate and nasal secretions for changes in amount and color  - [place] appropriate cooling/warming therapies per order  - Administer medications as ordered  - Instruct and encourage patient and family to use good hand hygiene technique  - Identify and instruct in appropriate isolation precautions for identified infection/condition  Outcome: Progressing  Goal: Absence of fever/infection during neutropenic period  Description: INTERVENTIONS:  - Monitor WBC  - Perform strict hand hygiene  - Limit to healthy visitors only  - No plants, dried, fresh or silk flowers with garcia in patient room  Outcome: Progressing     Problem: SAFETY ADULT  Goal: Patient will remain free of falls  Description: INTERVENTIONS:  - Educate patient/family on patient safety including physical limitations  - Instruct patient to call for assistance with activity   -  Consider consulting OT/PT to assist with strengthening/mobility based on AM PAC & JH-HLM score  - Consult OT/PT to assist with strengthening/mobility   - Keep Call bell within reach  - Keep bed low and locked with side rails adjusted as appropriate  - Keep care items and personal belongings within reach  - Initiate and maintain comfort rounds  - Make Fall Risk Sign visible to staff  - Offer Toileting every  Hours, in advance of need  - Initiate/Maintain alarm  - Obtain necessary fall risk management equipment:   - Apply yellow socks and bracelet for high fall risk patients  - Consider moving patient to room near nurses station  Outcome: Progressing  Goal: Maintain or return to baseline ADL function  Description: INTERVENTIONS:  -  Assess patient's ability to carry out ADLs; assess patient's baseline for ADL function and identify physical deficits which impact ability to perform ADLs (bathing, care of mouth/teeth, toileting, grooming, dressing, etc.)  - Assess/evaluate cause of self-care deficits   - Assess range of motion  - Assess patient's mobility; develop plan if impaired  - Assess patient's need for assistive devices and provide as appropriate  - Encourage maximum independence but intervene and supervise when necessary  - Involve family in performance of ADLs  - Assess for home care needs following discharge   - Consider OT consult to assist with ADL evaluation and planning for discharge  - Provide patient education as appropriate  - Monitor functional capacity and physical performance, use of AM PAC & JH-HLM   - Monitor gait, balance and fatigue with ambulation    Outcome: Progressing  Goal: Maintains/Returns to pre admission functional level  Description: INTERVENTIONS:  - Perform AM-PAC 6 Click Basic Mobility/ Daily Activity assessment daily.  - Set and communicate daily mobility goal to care team and patient/family/caregiver.   - Collaborate with rehabilitation services on mobility goals if consulted  -  Perform Range of Motion  times a day.  - Reposition patient every  hours.  - Dangle patient  times a day  - Stand patient  times a day  - Ambulate patient  times a day  - Out of bed to chair  times a day   - Out of bed for meal times a day  - Out of bed for toileting  - Record patient progress and toleration of activity level   Outcome: Progressing     Problem: DISCHARGE PLANNING  Goal: Discharge to home or other facility with appropriate resources  Description: INTERVENTIONS:  - Identify barriers to discharge w/patient and caregiver  - Arrange for needed discharge resources and transportation as appropriate  - Identify discharge learning needs (meds, wound care, etc.)  - Arrange for interpretive services to assist at discharge as needed  - Refer to Case Management Department for coordinating discharge planning if the patient needs post-hospital services based on physician/advanced practitioner order or complex needs related to functional status, cognitive ability, or social support system  Outcome: Progressing     Problem: Knowledge Deficit  Goal: Patient/family/caregiver demonstrates understanding of disease process, treatment plan, medications, and discharge instructions  Description: Complete learning assessment and assess knowledge base.  Interventions:  - Provide teaching at level of understanding  - Provide teaching via preferred learning methods  Outcome: Progressing

## 2025-07-17 NOTE — CONSULTS
Consultation - Surgery-General   Name: Shayla Davies 35 y.o. female I MRN: [id number]  Unit/Bed#: -01 I Date of Admission: 7/14/2025   Date of Service: 7/17/2025 I Hospital Day: 1   Inpatient consult to Acute Care Surgery  Consult performed by: Shayla Ceja PA-C  Consult ordered by: Loida Stein PA-C        Physician Requesting Evaluation: Fidel David MD   Reason for Evaluation / Principal Problem: Epigastric pain    Assessment & Plan  Epigastric pain  Epigastric pain x 2 weeks  Associated persistent nausea and vomiting  Decreased oral intake  Diarrhea yesterday  Denies any fevers or chills  Not tolerating diet  Abd soft, nondistended, tender to palpation of epigastric and right upper quadrant, normal active bowel sounds  Positive Garrison sign    U/S 7/11-Cholelithiasis without evidence of acute cholecystitis.     CT scan 7/14- No CT evidence of acute pancreatitis or other sources of acute abdominal pain identified. Status post gastric bypass surgery, with a dilated gastric pouch, better seen on the prior CT from 7/2/2025.    AFVSS  WBC 4.65 (4.36)  Lipase 86 on 7/14, 192 on 7/11  T. bili 0.89 (0.94)  Alk phos 129 (101)    Clear liquid diet  N.p.o. at midnight  As needed pain control  As needed antiemetics  Ambulate as tolerated  DVT prophylaxis  Possible laparoscopic cholecystectomy tomorrow  History of bariatric surgery  Reports gastric bypass in 2010  Nicotine dependence  Decline nicotine patch  Management per primary  Bipolar disorder (HCC)  On Keppra, Seroquel, Wellbutrin, Atarax, sinequan  Management per primary  Alcohol use disorder  On CIWA protocol  Thiamine, folate, multivitamin supplement  Management per primary    Surgery-General service will follow.    History of Present Illness   Shayla Davies is a 35 y.o. female with PMH of alcohol use disorder, gastric bypass, bipolar disorder, nicotine dependence, GERD, shingles who presents with epigastric pain for 2 weeks.  She has been in  and out of the ED 4 times.  Most recently she was seen in the ED on 7/11, with epigastric pain.  She was found to have an elevated lipase of 192.  On ultrasound she was found to have cholelithiasis without evidence of acute cholecystitis.  She was thought to have pancreatitis at this time.  She left AMA at this time.      She returned to the ED on 7/14, with worsening epigastric and right upper quadrant pain.  She has associated persistent nausea and vomiting.  Decreased oral intake.  CT was performed which indicated No CT evidence of acute pancreatitis or other sources of acute abdominal pain identified. Status post gastric bypass surgery, with a dilated gastric pouch.  Patient was amenable to admission at this time.  She was admitted to medicine with consult to GI.    GI differential diagnosis for her epigastric pain could have been caused by pancreatitis versus gastritis/marginal ulcer versus biliary colic.  She underwent an EGD on 7/15/2025 findings included irregular Z-line, mild generalized erythematous mucosa in the gastric pouch, no ulcers present, anastomosis appeared healthy.     Still with persistent epigastric/right upper quadrant pain, nausea, vomiting today.  Patient to undergo repeat right upper quadrant ultrasound.  Patient states nausea and some vomiting with breakfast today.  Patient states her pain is constant crampy and occasionally sharp pain in her epigastric/right upper quadrant area.  Patient denies having this pain in the past.  Patient states her last drink was 7/13/2025, prior to that she would drink 3-12 shots daily.  She is a chronic smoker starting at age 18 smoking 1 PPD recently switched to vaping.  Patient denies any fevers, chills, chest pain, shortness of breath.    Past surgical history includes gastric bypass in 2010, ovarian cyst removal 2011, appendectomy 2015.  She denies any other surgeries on her abdomen in the past.    Review of Systems   Constitutional:  Positive for  appetite change.   HENT: Negative.     Eyes: Negative.    Respiratory: Negative.     Cardiovascular: Negative.    Gastrointestinal:  Positive for abdominal pain, diarrhea, nausea and vomiting.   Endocrine: Negative.    Genitourinary: Negative.    Musculoskeletal: Negative.    Skin: Negative.    Allergic/Immunologic: Negative.    Neurological: Negative.    Hematological: Negative.    Psychiatric/Behavioral: Negative.       Medical History Review: I have reviewed the patient's PMH, PSH, Social History, Family History, Meds, and Allergies   Historical Information   Past Medical History[1]  Past Surgical History[2]  Social History[3]  E-Cigarette/Vaping    E-Cigarette Use Current Every Day User      E-Cigarette/Vaping Substances    Nicotine No     THC Yes     CBD No     Flavoring No     Other No     Unknown No      Family History[4]  Social History[5]    Current Facility-Administered Medications:     acetaminophen (Ofirmev) injection 1,000 mg, Q8H PRN    aluminum-magnesium hydroxide-simethicone (MAALOX) oral suspension 30 mL, Q6H PRN    buPROPion (WELLBUTRIN XL) 24 hr tablet 150 mg, QAM    doxepin (SINEquan) capsule 100 mg, HS    folic acid (FOLVITE) tablet 1 mg, Daily    gabapentin (NEURONTIN) capsule 800 mg, TID    hydrOXYzine HCL (ATARAX) tablet 25 mg, Q6H PRN    levETIRAcetam (KEPPRA) tablet 750 mg, BID    morphine injection 2 mg, Q3H PRN    morphine injection 4 mg, Q3H PRN    multivitamin-minerals (CENTRUM) tablet 1 tablet, Daily    naloxone (NARCAN) 0.04 mg/mL syringe 0.04 mg, Q1MIN PRN    ondansetron (ZOFRAN) injection 4 mg, Q4H PRN    oxyCODONE (ROXICODONE) IR tablet 5 mg, Q4H PRN    pantoprazole (PROTONIX) injection 40 mg, Q12H SANDRO    QUEtiapine (SEROquel) tablet 100 mg, BID    thiamine tablet 100 mg, Daily    trimethobenzamide (TIGAN) IM injection 200 mg, Q6H PRN  Prior to Admission Medications   Prescriptions Last Dose Informant Patient Reported? Taking?   Docusate Sodium (DOK) 100 MG capsule Past Month Self  No Yes   Sig: Take 1 tablet (100 mg total) by mouth 2 (two) times a day as needed for constipation   Multiple Vitamin (Daily Vites) tablet Past Week  No Yes   Sig: TAKE 1 TABLET BY MOUTH EVERY DAY   Multiple Vitamins-Minerals (Womens Multivitamin) TABS Past Week Self No Yes   Sig: Take 1 tablet by mouth daily   QUEtiapine (SEROquel) 100 mg tablet 7/15/2025  Yes Yes   Sig: Take 100 mg by mouth 2 (two) times a day   QUEtiapine (SEROquel) 50 mg tablet 7/15/2025  Yes Yes   Sig: Take 50 mg by mouth 2 (two) times a day   acetaminophen (TYLENOL) 325 mg tablet Past Week Self No Yes   Sig: Take 2 tablets (650 mg total) by mouth every 4 (four) hours as needed for mild pain or headaches   buPROPion (WELLBUTRIN XL) 150 mg 24 hr tablet 2025  Yes Yes   Sig: Take 150 mg by mouth every morning   busPIRone (BUSPAR) 7.5 mg tablet Not Taking  No No   Sig: Take 1 tablet (7.5 mg total) by mouth in the morning and 1 tablet (7.5 mg total) in the evening.   Patient not taking: Reported on 2025   chlorhexidine (PERIDEX) 0.12 % solution  Self Yes No   Sig: PLEASE SEE ATTACHED FOR DETAILED DIRECTIONS   cloNIDine (CATAPRES) 0.1 mg tablet More than a month  Yes No   Sig: Take 0.1 mg by mouth daily as needed (anxiety)   doxepin (SINEquan) 100 mg capsule Past Week  Yes Yes   Sig: Take 100 mg by mouth daily at bedtime   gabapentin (NEURONTIN) 800 mg tablet 2025  Yes Yes   Sig: Take 800 mg by mouth 3 (three) times a day   hydrOXYzine HCL (ATARAX) 25 mg tablet 7/15/2025 Morning Self No Yes   Sig: Take 1 tablet (25 mg total) by mouth every 6 (six) hours as needed for anxiety   levETIRAcetam (KEPPRA) 750 mg tablet   Yes Yes   Sig: Take 750 mg by mouth 2 (two) times a day   levonorgestrel (MIRENA) 20 MCG/24HR IUD  Self Yes No   Si each by Intrauterine route   ondansetron (ZOFRAN) 4 mg tablet 7/15/2025  No Yes   Sig: Take 1 tablet (4 mg total) by mouth every 6 (six) hours   oxyCODONE (ROXICODONE) 5 mg immediate release tablet  7/15/2025 Self Yes Yes   pantoprazole (PROTONIX) 20 mg tablet 7/16/2025  No Yes   Sig: Take 2 tablets (40 mg total) by mouth daily   sertraline (Zoloft) 25 mg tablet Unknown  No No   Sig: Take 1 tablet (25 mg total) by mouth in the morning.   sertraline (Zoloft) 50 mg tablet Unknown  No No   Sig: Take 1 tablet (50 mg total) by mouth in the morning.      Facility-Administered Medications: None     Motrin [ibuprofen]    Objective :  Temp:  [97.6 °F (36.4 °C)-98.6 °F (37 °C)] 98.6 °F (37 °C)  HR:  [] 83  BP: ()/(53-78) 106/56  Resp:  [12-17] 16  SpO2:  [97 %-100 %] 100 %  O2 Device: None (Room air)      Physical Exam  Constitutional:       Appearance: She is obese.   HENT:      Head: Normocephalic and atraumatic.      Nose: Nose normal.      Mouth/Throat:      Mouth: Mucous membranes are moist.      Pharynx: Oropharynx is clear.     Eyes:      Extraocular Movements: Extraocular movements intact.      Conjunctiva/sclera: Conjunctivae normal.      Pupils: Pupils are equal, round, and reactive to light.       Cardiovascular:      Rate and Rhythm: Normal rate and regular rhythm.   Pulmonary:      Effort: Pulmonary effort is normal.   Abdominal:      General: Abdomen is flat. There is no distension.      Palpations: Abdomen is soft.      Tenderness: There is abdominal tenderness (Epigastric and RUQ area). There is no guarding or rebound.      Comments: Positive Garrison sign     Musculoskeletal:         General: Normal range of motion.      Cervical back: Normal range of motion and neck supple.     Skin:     General: Skin is warm and dry.     Neurological:      Mental Status: She is alert and oriented to person, place, and time.     Psychiatric:         Mood and Affect: Mood normal.         Behavior: Behavior normal.         Thought Content: Thought content normal.         Judgment: Judgment normal.         Lab Results: I have reviewed the following results:  Recent Labs     07/14/25  1239 07/15/25  0500  07/16/25  0456 07/16/25  0540   WBC 4.95 4.36 4.65  --    HGB 12.9 11.0* 11.8  --    HCT 39.4 35.6 39.4  --     179 159  --    SODIUM 140 140  --  141   K 3.8 3.4*  --  4.3    104  --  107   CO2 25 29  --  25   BUN 12 7  --  6   CREATININE 1.04 0.96  --  0.95   GLUC 96 81  --  88   MG  --  2.1  --   --    AST 70* 49*  --  38   ALT 29 24  --  20   ALB 3.9 3.1*  --  3.2*   TBILI 0.86 0.94  --  0.89   ALKPHOS 118* 101  --  129*   LACTICACID 1.9  --   --   --        Imaging Results Review: I personally reviewed the following image studies in PACS and associated radiology reports: CT abdomen/pelvis and Ultrasound(s). Other Study Results Review: No additional pertinent studies reviewed.    VTE Pharmacologic Prophylaxis: VTE covered by:    None     VTE Mechanical Prophylaxis: sequential compression device    Administrative Statements   I have spent a total time of 30 minutes in caring for this patient on the day of the visit/encounter including Diagnostic results, Risks and benefits of tx options, Patient and family education, and Obtaining or reviewing history  .         [1]   Past Medical History:  Diagnosis Date    Abnormal Pap smear of cervix     ASCUS 12/16, ASCUS cannot exclude HGIL 2/19    Anemia     Depression     hx of depression     GERD (gastroesophageal reflux disease)     resolved per pt     History of removal of ovarian cyst     Increased BMI     Shingles     with pregnancy    Subcutaneous cyst     Varicella     had chicken pox as child/shingles with pregnancy   [2]   Past Surgical History:  Procedure Laterality Date    APPENDECTOMY      BARIATRIC SURGERY      GASTRIC BYPASS      2010    OTHER SURGICAL HISTORY  05/24/2021    bilat breast reduction and lift    OVARIAN CYST REMOVAL      2011   [3]   Social History  Tobacco Use    Smoking status: Former     Current packs/day: 0.00     Average packs/day: 0.5 packs/day for 9.0 years (4.5 ttl pk-yrs)     Types: Cigarettes     Start date: 3/19/2010      Quit date: 3/19/2019     Years since quittin.3    Smokeless tobacco: Never    Tobacco comments:     started to smoke again here and there    Vaping Use    Vaping status: Every Day    Substances: THC   Substance and Sexual Activity    Alcohol use: Yes     Comment: 4 shots per day    Drug use: No    Sexual activity: Yes     Partners: Male     Birth control/protection: I.U.D.   [4]   Family History  Problem Relation Name Age of Onset    No Known Problems Mother      No Known Problems Father      No Known Problems Sister      No Known Problems Brother      Cancer Maternal Grandmother          Lung    Heart failure Maternal Grandfather      No Known Problems Paternal Grandmother      No Known Problems Paternal Grandfather     [5]   Social History  Tobacco Use    Smoking status: Former     Current packs/day: 0.00     Average packs/day: 0.5 packs/day for 9.0 years (4.5 ttl pk-yrs)     Types: Cigarettes     Start date: 3/19/2010     Quit date: 3/19/2019     Years since quittin.3    Smokeless tobacco: Never    Tobacco comments:     started to smoke again here and there    Vaping Use    Vaping status: Every Day    Substances: THC   Substance and Sexual Activity    Alcohol use: Yes     Comment: 4 shots per day    Drug use: No    Sexual activity: Yes     Partners: Male     Birth control/protection: I.U.D.

## 2025-07-17 NOTE — PROGRESS NOTES
Progress Note - Hospitalist   Name: Shayla Davies 35 y.o. female I MRN: [id number]  Unit/Bed#: [facility] I Date of Admission: 7/14/2025   Date of Service: 7/17/2025 I Hospital Day: 1    Assessment & Plan  Epigastric pain  Patient presents with nausea, vomiting, epigastric pain for 2 weeks. Recently seen in ED multiple times for similar with concerns for pancreatitis but left AMA.  CT a/p showed no evidence of acute pancreatitis. Lipase 86 (improved from 192 on 7/11)  Differentials: Gastritis versus marginal ulcer due to previous bariatric surgery versus biliary colic  EGD (7/16): Irregular Z-line, mild generalized erythematous mucosa in gastric pouch as well as small amount of food bolus. Gastric suture site normal, no ulcers.  GI following, maintaining on PPI BID, IVFs, antiemetics  Consult general surgery, check repeat RUQ US if gallstones are contributing to symptoms  Alcohol use disorder  Reports chronic daily alcohol use, though has worsened over the last few weeks with last drink 7/13  Reports to SLIM she drinks about 3 shots liquor daily, reported to ED she sometimes drinks up to 12 shots daily  Denies history of withdrawal seizures  CIWA protocol, continue PTA gabapentin  Thiamine, folate, multivitamin supplementation  Case management consult for CATCH evaluation, patient declines transfer to detox unit at this time  Bipolar disorder (HCC)  Continue PTA Keppra, Seroquel, Atarax, Sinequan, Wellbutrin  History of bariatric surgery  Remote history of gastric bypass many years ago  Nicotine dependence  Reports chronic vaping, declines nicotine patches    VTE Pharmacologic Prophylaxis: VTE Score: 1 Low Risk (Score 0-2) - Encourage Ambulation.    Mobility:   Basic Mobility Inpatient Raw Score: 24  JH-HLM Goal: 8: Walk 250 feet or more  JH-HLM Achieved: 8: Walk 250 feet ot more  JH-HLM Goal achieved. Continue to encourage appropriate mobility.    Patient Centered Rounds: I performed bedside rounds with nursing  staff today.   Discussions with Specialists or Other Care Team Provider: GI, general surgery, case management    Education and Discussions with Family / Patient: Updated  () at bedside.    Current Length of Stay: 1 day(s)  Current Patient Status: Inpatient   Certification Statement: The patient will continue to require additional inpatient hospital stay due to abdominal pain, general surgery evaluation  Discharge Plan: Anticipate discharge in 24-48 hrs to home.    Code Status: Level 1 - Full Code    Subjective   Patient is seen at bedside this a.m., reports ongoing upper abdominal pain and nausea but no further vomiting, states she was only able to tolerate a few bites of breakfast this morning.    Objective :  Temp:  [97.9 °F (36.6 °C)-98.6 °F (37 °C)] 98.6 °F (37 °C)  HR:  [83-99] 83  BP: (103-110)/(56-78) 106/56  Resp:  [12-16] 16  SpO2:  [99 %-100 %] 100 %  O2 Device: None (Room air)    Body mass index is 31.28 kg/m².     Input and Output Summary (last 24 hours):     Intake/Output Summary (Last 24 hours) at 7/17/2025 1404  Last data filed at 7/17/2025 0501  Gross per 24 hour   Intake 750 ml   Output --   Net 750 ml       Physical Exam  Constitutional:       General: She is not in acute distress.     Appearance: She is not ill-appearing, toxic-appearing or diaphoretic.     Cardiovascular:      Rate and Rhythm: Normal rate and regular rhythm.      Pulses: Normal pulses.      Heart sounds: Normal heart sounds.   Pulmonary:      Effort: Pulmonary effort is normal. No respiratory distress.      Breath sounds: Normal breath sounds.   Abdominal:      General: There is no distension.      Palpations: Abdomen is soft.      Tenderness: There is abdominal tenderness. There is no guarding.      Comments: Significant tenderness to palpation of epigastric, RUQ and RLQ region.     Musculoskeletal:         General: No swelling or tenderness.     Skin:     General: Skin is warm.      Coloration: Skin is not  jaundiced.     Neurological:      General: No focal deficit present.      Mental Status: She is alert.     Psychiatric:         Mood and Affect: Mood normal.         Behavior: Behavior normal.           Lines/Drains:              Lab Results: I have reviewed the following results:   Results from last 7 days   Lab Units 07/16/25  0456 07/15/25  0500   WBC Thousand/uL 4.65 4.36   HEMOGLOBIN g/dL 11.8 11.0*   HEMATOCRIT % 39.4 35.6   PLATELETS Thousands/uL 159 179   SEGS PCT %  --  35*   LYMPHO PCT %  --  50*   MONO PCT %  --  11   EOS PCT %  --  3     Results from last 7 days   Lab Units 07/16/25  0540   SODIUM mmol/L 141   POTASSIUM mmol/L 4.3   CHLORIDE mmol/L 107   CO2 mmol/L 25   BUN mg/dL 6   CREATININE mg/dL 0.95   ANION GAP mmol/L 9   CALCIUM mg/dL 8.5   ALBUMIN g/dL 3.2*   TOTAL BILIRUBIN mg/dL 0.89   ALK PHOS U/L 129*   ALT U/L 20   AST U/L 38   GLUCOSE RANDOM mg/dL 88                 Results from last 7 days   Lab Units 07/14/25  1239   LACTIC ACID mmol/L 1.9       Recent Cultures (last 7 days):         Imaging Results Review: I reviewed radiology reports from this admission including: procedure reports.  Other Study Results Review: No additional pertinent studies reviewed.    Last 24 Hours Medication List:     Current Facility-Administered Medications:     acetaminophen (Ofirmev) injection 1,000 mg, Q8H PRN    aluminum-magnesium hydroxide-simethicone (MAALOX) oral suspension 30 mL, Q6H PRN    buPROPion (WELLBUTRIN XL) 24 hr tablet 150 mg, QAM    doxepin (SINEquan) capsule 100 mg, HS    folic acid (FOLVITE) tablet 1 mg, Daily    gabapentin (NEURONTIN) capsule 800 mg, TID    hydrOXYzine HCL (ATARAX) tablet 25 mg, Q6H PRN    levETIRAcetam (KEPPRA) tablet 750 mg, BID    morphine injection 2 mg, Q3H PRN    morphine injection 4 mg, Q3H PRN    multivitamin-minerals (CENTRUM) tablet 1 tablet, Daily    naloxone (NARCAN) 0.04 mg/mL syringe 0.04 mg, Q1MIN PRN    ondansetron (ZOFRAN) injection 4 mg, Q4H PRN     oxyCODONE (ROXICODONE) IR tablet 5 mg, Q4H PRN    pantoprazole (PROTONIX) injection 40 mg, Q12H SANDRO    QUEtiapine (SEROquel) tablet 100 mg, BID    thiamine tablet 100 mg, Daily    trimethobenzamide (TIGAN) IM injection 200 mg, Q6H PRN    Administrative Statements   Today, Patient Was Seen By: Loida Stein PA-C  I have spent a total time of 45 minutes in caring for this patient on the day of the visit/encounter including Documenting in the medical record, Reviewing/placing orders in the medical record (including tests, medications, and/or procedures), Obtaining or reviewing history  , and Communicating with other healthcare professionals .    **Please Note: This note may have been constructed using a voice recognition system.**

## 2025-07-17 NOTE — PLAN OF CARE
Problem: PAIN - ADULT  Goal: Verbalizes/displays adequate comfort level or baseline comfort level  Description: Interventions:  - Encourage patient to monitor pain and request assistance  - Assess pain using appropriate pain scale  - Administer analgesics as ordered based on type and severity of pain and evaluate response  - Implement non-pharmacological measures as appropriate and evaluate response  - Consider cultural and social influences on pain and pain management  - Notify physician/advanced practitioner if interventions unsuccessful or patient reports new pain  - Educate patient/family on pain management process including their role and importance of  reporting pain   - Provide non-pharmacologic/complimentary pain relief interventions  7/17/2025 0431 by Taylor Garcia RN  Outcome: Progressing  7/17/2025 0052 by Taylor Garcia, RN  Outcome: Progressing

## 2025-07-17 NOTE — ASSESSMENT & PLAN NOTE
Epigastric pain x 2 weeks  Associated persistent nausea and vomiting  Decreased oral intake  Diarrhea yesterday  Denies any fevers or chills  Not tolerating diet  Abd soft, nondistended, tender to palpation of epigastric and right upper quadrant, normal active bowel sounds  Positive Garrison sign    U/S 7/11-Cholelithiasis without evidence of acute cholecystitis.     CT scan 7/14- No CT evidence of acute pancreatitis or other sources of acute abdominal pain identified. Status post gastric bypass surgery, with a dilated gastric pouch, better seen on the prior CT from 7/2/2025.    AFVSS  WBC 4.65 (4.36)  Lipase 86 on 7/14, 192 on 7/11  T. bili 0.89 (0.94)  Alk phos 129 (101)    Clear liquid diet  N.p.o. at midnight  As needed pain control  As needed antiemetics  Ambulate as tolerated  DVT prophylaxis  Possible laparoscopic cholecystectomy tomorrow

## 2025-07-17 NOTE — ASSESSMENT & PLAN NOTE
Reports chronic daily alcohol use, though has worsened over the last few weeks with last drink 7/13  Reports to SLIM she drinks about 3 shots liquor daily, reported to ED she sometimes drinks up to 12 shots daily  Denies history of withdrawal seizures  CIWA protocol, continue PTA gabapentin  Thiamine, folate, multivitamin supplementation  Case management consult for CATCH evaluation, patient declines transfer to detox unit at this time

## 2025-07-17 NOTE — PROGRESS NOTES
Progress Note - Gastroenterology   Name: Shayla Davies 35 y.o. female I MRN: [id number]  Unit/Bed#: -01 I Date of Admission: 7/14/2025   Date of Service: 7/17/2025 I Hospital Day: 1    Assessment & Plan  Epigastric pain  35-year-old female with PMH including alcohol use disorder, bipolar disorder who presented with epigastric pain, nausea, vomiting x 2 weeks. Patient reports pain radiates to her back. Reports she has been unable to tolerate po intake recently. Denies hematemesis, coffee-ground emesis, black stools. No NSAID use. Seen in the ED on 7/11 with elevated lipase concerning for pancreatitis, she was recommended admission for treatment of pancreatitis but patient chose to leave AMA. Lipase has decreased from 192 on 7/11 to 86 on 7/14.  CT scan done 7/14 with no CT evidence of acute pancreatitis or other sources of acute abdominal pain identified. Status post gastric bypass surgery, with a dilated gastric pouch, better seen on the prior CT from 7/2/2025.   - EGD 7/17 with mild erythematous mucosa in the gastric pouch, biopsies pending. Small amount of food was noted in the gastric pouch, possibly due to element of gastroparesis  - General Surgery was consulted and repeat RUQ US was ordered to determine if gallstones could be contributing to symptoms  - Diet as tolerated with goal diet low fat/non-ulcerogenic. Patient currently NPO for possible laparoscopic cholecystectomy tomorrow  - Can continue PPI  - Antiemetics as needed    Alcohol use disorder  Patient reports drinking about 6 shots of liquor daily. Last drink 7/13.   - CIWA protocol per primary team  - Continue thiamine, folic acid, multivitamin supplementation    History of bariatric surgery  Patient reports history of gastric bypass surgery at age 20.         Subjective   Patient reports ongoing upper abdominal pain especially in RUQ. Reports nausea and headache. Reports vomiting bile last night. Minimal po intake.     Objective :  Temp:   [97.9 °F (36.6 °C)-98.6 °F (37 °C)] 98.6 °F (37 °C)  HR:  [83-99] 83  BP: (103-110)/(56-78) 106/56  Resp:  [12-16] 16  SpO2:  [99 %-100 %] 100 %  O2 Device: None (Room air)    Physical Exam  Vitals reviewed.   Constitutional:       General: She is not in acute distress.    Cardiovascular:      Rate and Rhythm: Normal rate.   Pulmonary:      Effort: Pulmonary effort is normal. No respiratory distress.   Abdominal:      Palpations: Abdomen is soft.      Tenderness: There is abdominal tenderness. There is no guarding or rebound.     Musculoskeletal:      Cervical back: Neck supple.     Skin:     General: Skin is warm and dry.     Neurological:      General: No focal deficit present.      Mental Status: She is alert.         Lab Results: I have reviewed the following results:

## 2025-07-17 NOTE — ASSESSMENT & PLAN NOTE
Patient reports drinking about 6 shots of liquor daily. Last drink 7/13.   - CIWA protocol per primary team  - Continue thiamine, folic acid, multivitamin supplementation

## 2025-07-17 NOTE — ASSESSMENT & PLAN NOTE
Patient presents with nausea, vomiting, epigastric pain for 2 weeks. Recently seen in ED multiple times for similar with concerns for pancreatitis but left AMA.  CT a/p showed no evidence of acute pancreatitis. Lipase 86 (improved from 192 on 7/11)  Differentials: Gastritis versus marginal ulcer due to previous bariatric surgery versus biliary colic  EGD (7/16): Irregular Z-line, mild generalized erythematous mucosa in gastric pouch as well as small amount of food bolus. Gastric suture site normal, no ulcers.  GI following, maintaining on PPI BID, IVFs, antiemetics  Consult general surgery, check repeat RUQ US if gallstones are contributing to symptoms

## 2025-07-17 NOTE — ASSESSMENT & PLAN NOTE
35-year-old female with PMH including alcohol use disorder, bipolar disorder who presented with epigastric pain, nausea, vomiting x 2 weeks. Patient reports pain radiates to her back. Reports she has been unable to tolerate po intake recently. Denies hematemesis, coffee-ground emesis, black stools. No NSAID use. Seen in the ED on 7/11 with elevated lipase concerning for pancreatitis, she was recommended admission for treatment of pancreatitis but patient chose to leave AMA. Lipase has decreased from 192 on 7/11 to 86 on 7/14.  CT scan done 7/14 with no CT evidence of acute pancreatitis or other sources of acute abdominal pain identified. Status post gastric bypass surgery, with a dilated gastric pouch, better seen on the prior CT from 7/2/2025.   - EGD 7/17 with mild erythematous mucosa in the gastric pouch, biopsies pending. Small amount of food was noted in the gastric pouch, possibly due to element of gastroparesis  - General Surgery was consulted and repeat RUQ US was ordered to determine if gallstones could be contributing to symptoms  - Diet as tolerated with goal diet low fat/non-ulcerogenic. Patient currently NPO for possible laparoscopic cholecystectomy tomorrow  - Can continue PPI  - Antiemetics as needed

## 2025-07-17 NOTE — PLAN OF CARE
Problem: PAIN - ADULT  Goal: Verbalizes/displays adequate comfort level or baseline comfort level  Description: Interventions:  - Encourage patient to monitor pain and request assistance  - Assess pain using appropriate pain scale  - Administer analgesics as ordered based on type and severity of pain and evaluate response  - Implement non-pharmacological measures as appropriate and evaluate response  - Consider cultural and social influences on pain and pain management  - Notify physician/advanced practitioner if interventions unsuccessful or patient reports new pain  - Educate patient/family on pain management process including their role and importance of  reporting pain   - Provide non-pharmacologic/complimentary pain relief interventions  Outcome: Progressing     Problem: INFECTION - ADULT  Goal: Absence or prevention of progression during hospitalization  Description: INTERVENTIONS:  - Assess and monitor for signs and symptoms of infection  - Monitor lab/diagnostic results  - Monitor all insertion sites, i.e. indwelling lines, tubes, and drains  - Monitor endotracheal if appropriate and nasal secretions for changes in amount and color  - [place] appropriate cooling/warming therapies per order  - Administer medications as ordered  - Instruct and encourage patient and family to use good hand hygiene technique  - Identify and instruct in appropriate isolation precautions for identified infection/condition  Outcome: Progressing     Problem: Knowledge Deficit  Goal: Patient/family/caregiver demonstrates understanding of disease process, treatment plan, medications, and discharge instructions  Description: Complete learning assessment and assess knowledge base.  Interventions:  - Provide teaching at level of understanding  - Provide teaching via preferred learning methods  Outcome: Progressing

## 2025-07-18 ENCOUNTER — ANESTHESIA (INPATIENT)
Dept: PERIOP | Facility: HOSPITAL | Age: 35
DRG: 263 | End: 2025-07-18
Payer: MEDICARE

## 2025-07-18 ENCOUNTER — ANESTHESIA EVENT (INPATIENT)
Dept: PERIOP | Facility: HOSPITAL | Age: 35
DRG: 263 | End: 2025-07-18
Payer: MEDICARE

## 2025-07-18 PROBLEM — S09.93XA: Status: ACTIVE | Noted: 2025-07-18

## 2025-07-18 LAB
ALBUMIN SERPL BCG-MCNC: 3.8 G/DL (ref 3.5–5)
ALP SERPL-CCNC: 137 U/L (ref 34–104)
ALT SERPL W P-5'-P-CCNC: 18 U/L (ref 7–52)
ANION GAP SERPL CALCULATED.3IONS-SCNC: 8 MMOL/L (ref 4–13)
AST SERPL W P-5'-P-CCNC: 26 U/L (ref 13–39)
BASOPHILS # BLD AUTO: 0.04 THOUSANDS/ÂΜL (ref 0–0.1)
BASOPHILS NFR BLD AUTO: 1 % (ref 0–1)
BILIRUB SERPL-MCNC: 0.43 MG/DL (ref 0.2–1)
BUN SERPL-MCNC: 9 MG/DL (ref 5–25)
CALCIUM SERPL-MCNC: 8.8 MG/DL (ref 8.4–10.2)
CHLORIDE SERPL-SCNC: 101 MMOL/L (ref 96–108)
CO2 SERPL-SCNC: 30 MMOL/L (ref 21–32)
CREAT SERPL-MCNC: 1.06 MG/DL (ref 0.6–1.3)
EOSINOPHIL # BLD AUTO: 0.12 THOUSAND/ÂΜL (ref 0–0.61)
EOSINOPHIL NFR BLD AUTO: 1 % (ref 0–6)
ERYTHROCYTE [DISTWIDTH] IN BLOOD BY AUTOMATED COUNT: 17.6 % (ref 11.6–15.1)
GFR SERPL CREATININE-BSD FRML MDRD: 68 ML/MIN/1.73SQ M
GLUCOSE SERPL-MCNC: 95 MG/DL (ref 65–140)
HCT VFR BLD AUTO: 41.9 % (ref 34.8–46.1)
HGB BLD-MCNC: 12.9 G/DL (ref 11.5–15.4)
IMM GRANULOCYTES # BLD AUTO: 0.03 THOUSAND/UL (ref 0–0.2)
IMM GRANULOCYTES NFR BLD AUTO: 0 % (ref 0–2)
LYMPHOCYTES # BLD AUTO: 2.36 THOUSANDS/ÂΜL (ref 0.6–4.47)
LYMPHOCYTES NFR BLD AUTO: 28 % (ref 14–44)
MCH RBC QN AUTO: 27.2 PG (ref 26.8–34.3)
MCHC RBC AUTO-ENTMCNC: 30.8 G/DL (ref 31.4–37.4)
MCV RBC AUTO: 88 FL (ref 82–98)
MONOCYTES # BLD AUTO: 0.51 THOUSAND/ÂΜL (ref 0.17–1.22)
MONOCYTES NFR BLD AUTO: 6 % (ref 4–12)
NEUTROPHILS # BLD AUTO: 5.25 THOUSANDS/ÂΜL (ref 1.85–7.62)
NEUTS SEG NFR BLD AUTO: 64 % (ref 43–75)
NRBC BLD AUTO-RTO: 0 /100 WBCS
PLATELET # BLD AUTO: 199 THOUSANDS/UL (ref 149–390)
PMV BLD AUTO: 9.2 FL (ref 8.9–12.7)
POTASSIUM SERPL-SCNC: 4 MMOL/L (ref 3.5–5.3)
PROT SERPL-MCNC: 7 G/DL (ref 6.4–8.4)
RBC # BLD AUTO: 4.74 MILLION/UL (ref 3.81–5.12)
SODIUM SERPL-SCNC: 139 MMOL/L (ref 135–147)
WBC # BLD AUTO: 8.31 THOUSAND/UL (ref 4.31–10.16)

## 2025-07-18 PROCEDURE — 94760 N-INVAS EAR/PLS OXIMETRY 1: CPT

## 2025-07-18 PROCEDURE — NC001 PR NO CHARGE

## 2025-07-18 PROCEDURE — 85025 COMPLETE CBC W/AUTO DIFF WBC: CPT | Performed by: PHYSICIAN ASSISTANT

## 2025-07-18 PROCEDURE — 8E0W4CZ ROBOTIC ASSISTED PROCEDURE OF TRUNK REGION, PERCUTANEOUS ENDOSCOPIC APPROACH: ICD-10-PCS | Performed by: SURGERY

## 2025-07-18 PROCEDURE — 99232 SBSQ HOSP IP/OBS MODERATE 35: CPT | Performed by: PHYSICIAN ASSISTANT

## 2025-07-18 PROCEDURE — 47562 LAPAROSCOPIC CHOLECYSTECTOMY: CPT | Performed by: SURGERY

## 2025-07-18 PROCEDURE — S2900 ROBOTIC SURGICAL SYSTEM: HCPCS | Performed by: PHYSICIAN ASSISTANT

## 2025-07-18 PROCEDURE — 47562 LAPAROSCOPIC CHOLECYSTECTOMY: CPT | Performed by: PHYSICIAN ASSISTANT

## 2025-07-18 PROCEDURE — S2900 ROBOTIC SURGICAL SYSTEM: HCPCS | Performed by: SURGERY

## 2025-07-18 PROCEDURE — 88304 TISSUE EXAM BY PATHOLOGIST: CPT | Performed by: STUDENT IN AN ORGANIZED HEALTH CARE EDUCATION/TRAINING PROGRAM

## 2025-07-18 PROCEDURE — 0FT44ZZ RESECTION OF GALLBLADDER, PERCUTANEOUS ENDOSCOPIC APPROACH: ICD-10-PCS | Performed by: SURGERY

## 2025-07-18 PROCEDURE — 80053 COMPREHEN METABOLIC PANEL: CPT | Performed by: PHYSICIAN ASSISTANT

## 2025-07-18 RX ORDER — ONDANSETRON 2 MG/ML
INJECTION INTRAMUSCULAR; INTRAVENOUS AS NEEDED
Status: DISCONTINUED | OUTPATIENT
Start: 2025-07-18 | End: 2025-07-18

## 2025-07-18 RX ORDER — FENTANYL CITRATE/PF 50 MCG/ML
25 SYRINGE (ML) INJECTION
Status: DISCONTINUED | OUTPATIENT
Start: 2025-07-18 | End: 2025-07-18 | Stop reason: HOSPADM

## 2025-07-18 RX ORDER — BUPIVACAINE HYDROCHLORIDE AND EPINEPHRINE 2.5; 5 MG/ML; UG/ML
INJECTION, SOLUTION EPIDURAL; INFILTRATION; INTRACAUDAL; PERINEURAL AS NEEDED
Status: DISCONTINUED | OUTPATIENT
Start: 2025-07-18 | End: 2025-07-18 | Stop reason: HOSPADM

## 2025-07-18 RX ORDER — CEFAZOLIN SODIUM 2 G/50ML
2000 SOLUTION INTRAVENOUS ONCE
Status: COMPLETED | OUTPATIENT
Start: 2025-07-18 | End: 2025-07-18

## 2025-07-18 RX ORDER — ROCURONIUM BROMIDE 10 MG/ML
INJECTION, SOLUTION INTRAVENOUS AS NEEDED
Status: DISCONTINUED | OUTPATIENT
Start: 2025-07-18 | End: 2025-07-18

## 2025-07-18 RX ORDER — HYDROMORPHONE HCL/PF 1 MG/ML
SYRINGE (ML) INJECTION AS NEEDED
Status: DISCONTINUED | OUTPATIENT
Start: 2025-07-18 | End: 2025-07-18

## 2025-07-18 RX ORDER — INDOCYANINE GREEN AND WATER 25 MG
2.5 KIT INJECTION ONCE
Status: COMPLETED | OUTPATIENT
Start: 2025-07-18 | End: 2025-07-18

## 2025-07-18 RX ORDER — MIDAZOLAM HYDROCHLORIDE 2 MG/2ML
INJECTION, SOLUTION INTRAMUSCULAR; INTRAVENOUS AS NEEDED
Status: DISCONTINUED | OUTPATIENT
Start: 2025-07-18 | End: 2025-07-18

## 2025-07-18 RX ORDER — PROPOFOL 10 MG/ML
INJECTION, EMULSION INTRAVENOUS AS NEEDED
Status: DISCONTINUED | OUTPATIENT
Start: 2025-07-18 | End: 2025-07-18

## 2025-07-18 RX ORDER — FENTANYL CITRATE 50 UG/ML
INJECTION, SOLUTION INTRAMUSCULAR; INTRAVENOUS AS NEEDED
Status: DISCONTINUED | OUTPATIENT
Start: 2025-07-18 | End: 2025-07-18

## 2025-07-18 RX ORDER — ONDANSETRON 2 MG/ML
4 INJECTION INTRAMUSCULAR; INTRAVENOUS ONCE AS NEEDED
Status: DISCONTINUED | OUTPATIENT
Start: 2025-07-18 | End: 2025-07-18 | Stop reason: HOSPADM

## 2025-07-18 RX ORDER — PROPOFOL 10 MG/ML
INJECTION, EMULSION INTRAVENOUS CONTINUOUS PRN
Status: DISCONTINUED | OUTPATIENT
Start: 2025-07-18 | End: 2025-07-18

## 2025-07-18 RX ORDER — DIPHENHYDRAMINE HYDROCHLORIDE AND LIDOCAINE HYDROCHLORIDE AND ALUMINUM HYDROXIDE AND MAGNESIUM HYDRO
10 KIT ONCE
Status: COMPLETED | OUTPATIENT
Start: 2025-07-18 | End: 2025-07-18

## 2025-07-18 RX ORDER — SODIUM CHLORIDE, SODIUM LACTATE, POTASSIUM CHLORIDE, CALCIUM CHLORIDE 600; 310; 30; 20 MG/100ML; MG/100ML; MG/100ML; MG/100ML
INJECTION, SOLUTION INTRAVENOUS CONTINUOUS PRN
Status: DISCONTINUED | OUTPATIENT
Start: 2025-07-18 | End: 2025-07-18

## 2025-07-18 RX ORDER — KETAMINE HCL IN NACL, ISO-OSM 100MG/10ML
SYRINGE (ML) INJECTION AS NEEDED
Status: DISCONTINUED | OUTPATIENT
Start: 2025-07-18 | End: 2025-07-18

## 2025-07-18 RX ORDER — HYDROMORPHONE HCL/PF 1 MG/ML
0.5 SYRINGE (ML) INJECTION
Status: DISCONTINUED | OUTPATIENT
Start: 2025-07-18 | End: 2025-07-18 | Stop reason: HOSPADM

## 2025-07-18 RX ORDER — DEXAMETHASONE SODIUM PHOSPHATE 10 MG/ML
INJECTION, SOLUTION INTRAMUSCULAR; INTRAVENOUS AS NEEDED
Status: DISCONTINUED | OUTPATIENT
Start: 2025-07-18 | End: 2025-07-18

## 2025-07-18 RX ORDER — LIDOCAINE HYDROCHLORIDE 20 MG/ML
INJECTION, SOLUTION EPIDURAL; INFILTRATION; INTRACAUDAL; PERINEURAL AS NEEDED
Status: DISCONTINUED | OUTPATIENT
Start: 2025-07-18 | End: 2025-07-18

## 2025-07-18 RX ORDER — PROMETHAZINE HYDROCHLORIDE 25 MG/ML
6.25 INJECTION, SOLUTION INTRAMUSCULAR; INTRAVENOUS
Status: DISCONTINUED | OUTPATIENT
Start: 2025-07-18 | End: 2025-07-18 | Stop reason: HOSPADM

## 2025-07-18 RX ADMIN — Medication 1 TABLET: at 08:38

## 2025-07-18 RX ADMIN — BUPROPION HYDROCHLORIDE 150 MG: 150 TABLET, FILM COATED, EXTENDED RELEASE ORAL at 08:38

## 2025-07-18 RX ADMIN — FOLIC ACID 1 MG: 1 TABLET ORAL at 08:39

## 2025-07-18 RX ADMIN — DEXMEDETOMIDINE HYDROCHLORIDE 20 MCG: 100 INJECTION, SOLUTION INTRAVENOUS at 11:09

## 2025-07-18 RX ADMIN — BENZOCAINE 1 APPLICATION: 220 GEL, DENTIFRICE DENTAL at 22:59

## 2025-07-18 RX ADMIN — OXYCODONE HYDROCHLORIDE 5 MG: 5 TABLET ORAL at 21:25

## 2025-07-18 RX ADMIN — ROCURONIUM BROMIDE 50 MG: 10 INJECTION, SOLUTION INTRAVENOUS at 10:34

## 2025-07-18 RX ADMIN — DEXMEDETOMIDINE HYDROCHLORIDE 20 MCG: 100 INJECTION, SOLUTION INTRAVENOUS at 10:45

## 2025-07-18 RX ADMIN — MORPHINE SULFATE 2 MG: 2 INJECTION, SOLUTION INTRAMUSCULAR; INTRAVENOUS at 06:16

## 2025-07-18 RX ADMIN — MORPHINE SULFATE 2 MG: 2 INJECTION, SOLUTION INTRAMUSCULAR; INTRAVENOUS at 18:56

## 2025-07-18 RX ADMIN — GABAPENTIN 800 MG: 400 CAPSULE ORAL at 21:24

## 2025-07-18 RX ADMIN — LEVETIRACETAM 750 MG: 250 TABLET, FILM COATED ORAL at 17:51

## 2025-07-18 RX ADMIN — DEXAMETHASONE SODIUM PHOSPHATE 10 MG: 10 INJECTION, SOLUTION INTRAMUSCULAR; INTRAVENOUS at 10:34

## 2025-07-18 RX ADMIN — SODIUM CHLORIDE, SODIUM LACTATE, POTASSIUM CHLORIDE, AND CALCIUM CHLORIDE: .6; .31; .03; .02 INJECTION, SOLUTION INTRAVENOUS at 11:23

## 2025-07-18 RX ADMIN — SUGAMMADEX 200 MG: 100 INJECTION, SOLUTION INTRAVENOUS at 12:00

## 2025-07-18 RX ADMIN — PANTOPRAZOLE SODIUM 40 MG: 40 INJECTION, POWDER, FOR SOLUTION INTRAVENOUS at 08:39

## 2025-07-18 RX ADMIN — MIDAZOLAM HYDROCHLORIDE 2 MG: 1 INJECTION, SOLUTION INTRAMUSCULAR; INTRAVENOUS at 10:24

## 2025-07-18 RX ADMIN — Medication 50 MG: at 10:34

## 2025-07-18 RX ADMIN — MORPHINE SULFATE 2 MG: 2 INJECTION, SOLUTION INTRAMUSCULAR; INTRAVENOUS at 15:55

## 2025-07-18 RX ADMIN — PROPOFOL 200 MG: 10 INJECTION, EMULSION INTRAVENOUS at 10:34

## 2025-07-18 RX ADMIN — LEVETIRACETAM 750 MG: 250 TABLET, FILM COATED ORAL at 08:38

## 2025-07-18 RX ADMIN — ACETAMINOPHEN 1000 MG: 10 INJECTION, SOLUTION INTRAVENOUS at 07:27

## 2025-07-18 RX ADMIN — PROPOFOL 30 MCG/KG/MIN: 10 INJECTION, EMULSION INTRAVENOUS at 10:38

## 2025-07-18 RX ADMIN — DIPHENHYDRAMINE HYDROCHLORIDE AND LIDOCAINE HYDROCHLORIDE AND ALUMINUM HYDROXIDE AND MAGNESIUM HYDRO 10 ML: KIT at 07:04

## 2025-07-18 RX ADMIN — MORPHINE SULFATE 2 MG: 2 INJECTION, SOLUTION INTRAMUSCULAR; INTRAVENOUS at 22:39

## 2025-07-18 RX ADMIN — GABAPENTIN 800 MG: 400 CAPSULE ORAL at 15:55

## 2025-07-18 RX ADMIN — THIAMINE HCL TAB 100 MG 100 MG: 100 TAB at 08:39

## 2025-07-18 RX ADMIN — HYDROMORPHONE HYDROCHLORIDE 1 MG: 1 INJECTION, SOLUTION INTRAMUSCULAR; INTRAVENOUS; SUBCUTANEOUS at 10:24

## 2025-07-18 RX ADMIN — BENZOCAINE 1 APPLICATION: 220 GEL, DENTIFRICE DENTAL at 09:36

## 2025-07-18 RX ADMIN — QUETIAPINE FUMARATE 100 MG: 100 TABLET ORAL at 08:38

## 2025-07-18 RX ADMIN — ROCURONIUM BROMIDE 30 MG: 10 INJECTION, SOLUTION INTRAVENOUS at 11:07

## 2025-07-18 RX ADMIN — ROCURONIUM BROMIDE 10 MG: 10 INJECTION, SOLUTION INTRAVENOUS at 11:37

## 2025-07-18 RX ADMIN — ONDANSETRON 4 MG: 2 INJECTION INTRAMUSCULAR; INTRAVENOUS at 11:45

## 2025-07-18 RX ADMIN — MORPHINE SULFATE 2 MG: 2 INJECTION, SOLUTION INTRAMUSCULAR; INTRAVENOUS at 09:35

## 2025-07-18 RX ADMIN — DOXEPIN HYDROCHLORIDE 100 MG: 100 CAPSULE ORAL at 21:25

## 2025-07-18 RX ADMIN — FENTANYL CITRATE 100 MCG: 50 INJECTION, SOLUTION INTRAMUSCULAR; INTRAVENOUS at 11:19

## 2025-07-18 RX ADMIN — QUETIAPINE FUMARATE 100 MG: 100 TABLET ORAL at 17:51

## 2025-07-18 RX ADMIN — INDOCYANINE GREEN AND WATER 2.5 MG: KIT at 09:55

## 2025-07-18 RX ADMIN — LIDOCAINE HYDROCHLORIDE 100 MG: 20 INJECTION, SOLUTION EPIDURAL; INFILTRATION; INTRACAUDAL; PERINEURAL at 10:33

## 2025-07-18 RX ADMIN — PANTOPRAZOLE SODIUM 40 MG: 40 INJECTION, POWDER, FOR SOLUTION INTRAVENOUS at 22:14

## 2025-07-18 RX ADMIN — CEFAZOLIN SODIUM 2000 MG: 2 SOLUTION INTRAVENOUS at 10:39

## 2025-07-18 RX ADMIN — GABAPENTIN 800 MG: 400 CAPSULE ORAL at 08:44

## 2025-07-18 RX ADMIN — SODIUM CHLORIDE, SODIUM LACTATE, POTASSIUM CHLORIDE, AND CALCIUM CHLORIDE: .6; .31; .03; .02 INJECTION, SOLUTION INTRAVENOUS at 10:06

## 2025-07-18 NOTE — PLAN OF CARE
Problem: INFECTION - ADULT  Goal: Absence or prevention of progression during hospitalization  Description: INTERVENTIONS:  - Assess and monitor for signs and symptoms of infection  - Monitor lab/diagnostic results  - Monitor all insertion sites, i.e. indwelling lines, tubes, and drains  - Monitor endotracheal if appropriate and nasal secretions for changes in amount and color  - [place] appropriate cooling/warming therapies per order  - Administer medications as ordered  - Instruct and encourage patient and family to use good hand hygiene technique  - Identify and instruct in appropriate isolation precautions for identified infection/condition  7/18/2025 0431 by Yady Stacy RN  Outcome: Progressing  7/18/2025 0008 by Yady Stacy RN  Outcome: Progressing     Problem: PAIN - ADULT  Goal: Verbalizes/displays adequate comfort level or baseline comfort level  Description: Interventions:  - Encourage patient to monitor pain and request assistance  - Assess pain using appropriate pain scale  - Administer analgesics as ordered based on type and severity of pain and evaluate response  - Implement non-pharmacological measures as appropriate and evaluate response  - Consider cultural and social influences on pain and pain management  - Notify physician/advanced practitioner if interventions unsuccessful or patient reports new pain  - Educate patient/family on pain management process including their role and importance of  reporting pain   - Provide non-pharmacologic/complimentary pain relief interventions  Outcome: Not Progressing

## 2025-07-18 NOTE — PROGRESS NOTES
Progress Note - Hospitalist   Name: Shayla Davies 35 y.o. female I MRN: [id number]  Unit/Bed#: OR POOL I Date of Admission: 7/14/2025   Date of Service: 7/18/2025 I Hospital Day: 2    Assessment & Plan  Epigastric pain  Patient presents with nausea, vomiting, epigastric pain for 2 weeks. Recently seen in ED multiple times for similar with concerns for pancreatitis but left AMA.  Differentials: Gastritis versus marginal ulcer due to previous bariatric surgery versus biliary colic  CT a/p showed no evidence of acute pancreatitis  Lipase 86 on admission (improved from 192 on 7/11)  EGD (7/16): Irregular Z-line, mild generalized erythematous mucosa in gastric pouch as well as small amount of food bolus. Gastric suture site normal, no ulcers.  GI following, maintaining on PPI BID, IVFs, antiemetics  Follow-up RUQ US still showing cholelithiasis with CBD measuring 6 mm, upper limits of normal & stable from prior  General Surgery consulted, plan for cholecystectomy today  Alcohol use disorder  Reports chronic daily alcohol use, though has worsened over the last few weeks with last drink 7/13  Reported to SLIM she drinks about 3 shots liquor daily, reported to ED she sometimes drinks up to 12 shots daily  Denies history of withdrawal seizures  CIWA protocol, continue PTA gabapentin  Thiamine, folate, multivitamin supplementation  Case management consult for CATCH evaluation, patient declines transfer to detox unit at this time  Pain due to dental trauma  Patient reports breaking/chipping one of her maxillary molars on the left side has been causing significant pain, particularly when he rubs against her denture  Reports using Orajel OTC at home, did not have much relief with Magic mouthwash inpatient  No concern for dental abscess/infection on exam  Apply topical benzocaine today, recommend close outpatient F/U with dentist upon discharge  Bipolar disorder (HCC)  Continue PTA Keppra, Seroquel, Atarax, Sinequan,  Wellbutrin  History of bariatric surgery  Remote history of gastric bypass many years ago  Nicotine dependence  Reports chronic vaping, declines nicotine patches    VTE Pharmacologic Prophylaxis: VTE Score: 1 Low Risk (Score 0-2) - Encourage Ambulation.    Mobility:   Basic Mobility Inpatient Raw Score: 24  JH-HLM Goal: 8: Walk 250 feet or more  JH-HLM Achieved: 8: Walk 250 feet ot more  JH-HLM Goal achieved. Continue to encourage appropriate mobility.    Patient Centered Rounds: I performed bedside rounds with nursing staff today.   Discussions with Specialists or Other Care Team Provider: General Surgery    Education and Discussions with Family / Patient: Updated  () at bedside.    Current Length of Stay: 2 day(s)  Current Patient Status: Inpatient   Certification Statement: The patient will continue to require additional inpatient hospital stay due to cholecystectomy today, clinical improvement  Discharge Plan: Anticipate discharge in 24-48 hrs to home.    Code Status: Level 1 - Full Code    Subjective   Patient is seen at bedside this a.m., reports ongoing upper abdominal pain with some nausea but no further vomiting.    Objective :  Temp:  [97.4 °F (36.3 °C)-98.3 °F (36.8 °C)] 97.8 °F (36.6 °C)  HR:  [] 82  BP: ()/(54-73) 104/63  Resp:  [12-18] 14  SpO2:  [96 %-100 %] 97 %  O2 Device: None (Room air)    Body mass index is 31.28 kg/m².     Input and Output Summary (last 24 hours):     Intake/Output Summary (Last 24 hours) at 7/18/2025 1324  Last data filed at 7/18/2025 1204  Gross per 24 hour   Intake 2050 ml   Output --   Net 2050 ml       Physical Exam  Constitutional:       General: She is not in acute distress.     Appearance: She is not ill-appearing, toxic-appearing or diaphoretic.     Cardiovascular:      Rate and Rhythm: Normal rate and regular rhythm.      Pulses: Normal pulses.      Heart sounds: Normal heart sounds.   Pulmonary:      Effort: Pulmonary effort is normal.  No respiratory distress.      Breath sounds: Normal breath sounds.   Abdominal:      General: There is no distension.      Palpations: Abdomen is soft.      Tenderness: There is abdominal tenderness. There is no guarding.      Comments: Mild tenderness to palpation of epigastric/RUQ region     Musculoskeletal:         General: No swelling or tenderness.     Skin:     General: Skin is warm.      Coloration: Skin is not jaundiced.     Neurological:      General: No focal deficit present.      Mental Status: She is alert.     Psychiatric:         Mood and Affect: Mood normal.         Behavior: Behavior normal.           Lines/Drains:              Lab Results: I have reviewed the following results:   Results from last 7 days   Lab Units 07/18/25  0621   WBC Thousand/uL 8.31   HEMOGLOBIN g/dL 12.9   HEMATOCRIT % 41.9   PLATELETS Thousands/uL 199   SEGS PCT % 64   LYMPHO PCT % 28   MONO PCT % 6   EOS PCT % 1     Results from last 7 days   Lab Units 07/18/25  0621   SODIUM mmol/L 139   POTASSIUM mmol/L 4.0   CHLORIDE mmol/L 101   CO2 mmol/L 30   BUN mg/dL 9   CREATININE mg/dL 1.06   ANION GAP mmol/L 8   CALCIUM mg/dL 8.8   ALBUMIN g/dL 3.8   TOTAL BILIRUBIN mg/dL 0.43   ALK PHOS U/L 137*   ALT U/L 18   AST U/L 26   GLUCOSE RANDOM mg/dL 95                 Results from last 7 days   Lab Units 07/14/25  1239   LACTIC ACID mmol/L 1.9       Recent Cultures (last 7 days):         Imaging Results Review: I reviewed radiology reports from this admission including: Ultrasound(s).  Other Study Results Review: No additional pertinent studies reviewed.    Last 24 Hours Medication List:     Current Facility-Administered Medications:     [Transfer Hold] acetaminophen (Ofirmev) injection 1,000 mg, Q8H PRN, Last Rate: 1,000 mg (07/18/25 0727)    [Transfer Hold] aluminum-magnesium hydroxide-simethicone (MAALOX) oral suspension 30 mL, Q6H PRN    [Transfer Hold] buPROPion (WELLBUTRIN XL) 24 hr tablet 150 mg, QAM    [Transfer Hold] doxepin  (SINEquan) capsule 100 mg, HS    fentaNYL (SUBLIMAZE) injection 25 mcg, Q5 Min PRN    [Transfer Hold] folic acid (FOLVITE) tablet 1 mg, Daily    [Transfer Hold] gabapentin (NEURONTIN) capsule 800 mg, TID    HYDROmorphone (DILAUDID) injection 0.5 mg, Q15 Min PRN    [Transfer Hold] hydrOXYzine HCL (ATARAX) tablet 25 mg, Q6H PRN    [Transfer Hold] levETIRAcetam (KEPPRA) tablet 750 mg, BID    [Transfer Hold] morphine injection 2 mg, Q3H PRN    [Transfer Hold] morphine injection 4 mg, Q3H PRN    [Transfer Hold] multivitamin-minerals (CENTRUM) tablet 1 tablet, Daily    [Transfer Hold] naloxone (NARCAN) 0.04 mg/mL syringe 0.04 mg, Q1MIN PRN    [Transfer Hold] ondansetron (ZOFRAN) injection 4 mg, Q4H PRN    ondansetron (ZOFRAN) injection 4 mg, Once PRN    [Transfer Hold] oxyCODONE (ROXICODONE) IR tablet 5 mg, Q4H PRN    [Transfer Hold] pantoprazole (PROTONIX) injection 40 mg, Q12H SANDRO    promethazine (PHENERGAN) injection 6.25 mg, Q10 Min PRN    [Transfer Hold] QUEtiapine (SEROquel) tablet 100 mg, BID    [Transfer Hold] thiamine tablet 100 mg, Daily    [Transfer Hold] trimethobenzamide (TIGAN) IM injection 200 mg, Q6H PRN    Administrative Statements   Today, Patient Was Seen By: Loida Stein PA-C  I have spent a total time of 35 minutes in caring for this patient on the day of the visit/encounter including Counseling / Coordination of care, Documenting in the medical record, Reviewing/placing orders in the medical record (including tests, medications, and/or procedures), and Communicating with other healthcare professionals .    **Please Note: This note may have been constructed using a voice recognition system.**

## 2025-07-18 NOTE — ANESTHESIA PREPROCEDURE EVALUATION
Procedure:  CHOLECYSTECTOMY LAPAROSCOPIC W/ ROBOTICS (Abdomen)    Relevant Problems   GYN   (+) 39 weeks gestation of pregnancy      HEMATOLOGY   (+) Anemia      MUSCULOSKELETAL   (+) Chronic bilateral thoracic back pain      NEURO/PSYCH   (+) Chronic bilateral thoracic back pain   (+) Recurrent major depressive disorder (HCC)        Physical Exam    Airway     Mallampati score: III  TM Distance: >3 FB  Neck ROM: full  Mouth opening: >= 4 cm      Cardiovascular  Cardiovascular exam normal    Dental   No notable dental hx     Pulmonary  Pulmonary exam normal     Neurological  - normal exam    Other Findings  EtOH--6 shots/day  post-pubertal.EtOH--6 shots/day          Anesthesia Plan  ASA Score- 2     Anesthesia Type- general with ASA Monitors.         Additional Monitors:     Airway Plan: Oral ETT.           Plan Factors-Exercise tolerance (METS): >4 METS.    Chart reviewed. EKG reviewed.  Existing labs reviewed. Patient summary reviewed.    Patient is a current smoker.  Patient did not smoke on day of surgery.            Induction- intravenous.    Postoperative Plan- Plan for postoperative opioid use.   Monitoring Plan - Monitoring plan - standard ASA monitoring  Post Operative Pain Plan - non-opiod analgesics and plan for postoperative opioid use    Perioperative Resuscitation Plan - Level 1 - Full Code.       Informed Consent- Anesthetic plan and risks discussed with patient.  I personally reviewed this patient with the CRNA. Discussed and agreed on the Anesthesia Plan with the CRNA..      NPO Status:  Vitals Value Taken Time   Date of last liquid 07/17/25 07/18/25 10:04   Time of last liquid 2359 07/18/25 10:04   Date of last solid 07/17/25 07/18/25 10:04   Time of last solid 1500 07/18/25 10:04          21-Dec-2020 17:21

## 2025-07-18 NOTE — OP NOTE
OPERATIVE REPORT  PATIENT NAME: Shayla Davies    :  1990  MRN: [id number]  Pt Location: [facility] OR ROOM 02    SURGERY DATE: 2025    Surgeons and Role:     * Mike Castrejon MD - Primary     * Chari Truong PA-C    Preop Diagnosis:  Cholelithiasis [K80.20]    Post-Op Diagnosis Codes:     * Cholelithiasis [K80.20]    Procedure(s):  CHOLECYSTECTOMY LAPAROSCOPIC W/ ROBOTICS    Specimen(s):  ID Type Source Tests Collected by Time Destination   1 : gallbladder Tissue Gallbladder TISSUE EXAM Mike Castrejon MD 2025 1110        Estimated Blood Loss:   Minimal    Drains:  * No LDAs found *    Anesthesia Type:   Choice    Operative Indications:  Cholelithiasis [K80.20]      Operative Findings:  Mildly distended gallbladder.  No signs of inflammation.  Some omental adhesions to the lateral part of the right lobe of the liver which looked chronic.  Palpable stones in the specimen.  Critical view of safety was obtained.  Biliary anatomy was confirmed using immunofluorescence.       Complications:   None    Procedure and Technique:  Patient was brought to the operating room.  General anesthesia given anesthesia team.  Parts prepped and draped in standard fashion.  Patient received preoperative IV antibiotic.  Timeout performed.  Supraumbilical incision was made and deepened to the fascia.  Fascia was opened sharply and a 5 mm port was inserted.  Insufflation was done.  We checked for injuries and none were found.  Robotic port placed in the subcostal region in the right midclavicular, right anterior axillary and epigastric region.  The initial entry trocar was exchanged for a 12 mm port and a robotic port was nested in it.  Patient was placed in head up left side down position.  Robot was brought to the field and docked.  Robotic instruments were inserted.  Using a ProGrasp the robotic fundus was elevated cephalad.  The peritoneum around the Calot's triangle was opened sharply using hook electrocautery.   The cystic duct and cystic artery were identified and dissected.  Critical view of safety was obtained.  Biliary anatomy confirmed using electrophoresis.  Artery and duct were clipped multiple times and were divided between the distalmost clips.  The gallbladder was dissected from the gallbladder fossa using hook electrocautery.  Hemostasis was adequate.  It was delivered in an Endo Catch bag.  Robot was undocked.  All the ports were removed along with the Endo Catch bag, specimen, gauze.  The supraumbilical port was closed at the fascia using 0 Vicryl in interrupted fashion.  Local anesthesia given at all port site.  The skin was closed using 4-0 Monocryl in subcuticular fashion.  Exofin was applied.  Patient was reversed from anesthesia and taken to recovery under stable condition.   I was present for the entire procedure., A qualified resident physician was not available., and A physician assistant was required during the procedure for retraction, tissue handling, dissection and suturing.    Patient Disposition:  PACU          SIGNATURE: Mike Castrejon MD  DATE: July 18, 2025  TIME: 12:21 PM

## 2025-07-18 NOTE — ASSESSMENT & PLAN NOTE
Epigastric pain x 2 weeks  Pain is 9 out of 10 this morning  Nausea but no vomiting today  Denies any fevers or chills  Abd soft, nondistended, tender to palpation of epigastric and right upper quadrant, normal active bowel sounds    AFVSS  WBC 8.31 (4.65)  Lipase 86 on 7/14, 192 on 7/11  T. bili 0.43 (0.89)  Alk phos 137 (129)    N.p.o.  Preop antibiotics ordered  As needed pain control  As needed antiemetics  Ambulate as tolerated  DVT prophylaxis  Robotic cholecystectomy today with Dr. Castrejon

## 2025-07-18 NOTE — PLAN OF CARE
Problem: INFECTION - ADULT  Goal: Absence or prevention of progression during hospitalization  Description: INTERVENTIONS:  - Assess and monitor for signs and symptoms of infection  - Monitor lab/diagnostic results  - Monitor all insertion sites, i.e. indwelling lines, tubes, and drains  - Monitor endotracheal if appropriate and nasal secretions for changes in amount and color  - [place] appropriate cooling/warming therapies per order  - Administer medications as ordered  - Instruct and encourage patient and family to use good hand hygiene technique  - Identify and instruct in appropriate isolation precautions for identified infection/condition  Outcome: Progressing     Problem: PAIN - ADULT  Goal: Verbalizes/displays adequate comfort level or baseline comfort level  Description: Interventions:  - Encourage patient to monitor pain and request assistance  - Assess pain using appropriate pain scale  - Administer analgesics as ordered based on type and severity of pain and evaluate response  - Implement non-pharmacological measures as appropriate and evaluate response  - Consider cultural and social influences on pain and pain management  - Notify physician/advanced practitioner if interventions unsuccessful or patient reports new pain  - Educate patient/family on pain management process including their role and importance of  reporting pain   - Provide non-pharmacologic/complimentary pain relief interventions  Outcome: Not Progressing

## 2025-07-18 NOTE — PLAN OF CARE
Problem: PAIN - ADULT  Goal: Verbalizes/displays adequate comfort level or baseline comfort level  Description: Interventions:  - Encourage patient to monitor pain and request assistance  - Assess pain using appropriate pain scale  - Administer analgesics as ordered based on type and severity of pain and evaluate response  - Implement non-pharmacological measures as appropriate and evaluate response  - Consider cultural and social influences on pain and pain management  - Notify physician/advanced practitioner if interventions unsuccessful or patient reports new pain  - Educate patient/family on pain management process including their role and importance of  reporting pain   - Provide non-pharmacologic/complimentary pain relief interventions  Outcome: Progressing     Problem: INFECTION - ADULT  Goal: Absence or prevention of progression during hospitalization  Description: INTERVENTIONS:  - Assess and monitor for signs and symptoms of infection  - Monitor lab/diagnostic results  - Monitor all insertion sites, i.e. indwelling lines, tubes, and drains  - Monitor endotracheal if appropriate and nasal secretions for changes in amount and color  - [place] appropriate cooling/warming therapies per order  - Administer medications as ordered  - Instruct and encourage patient and family to use good hand hygiene technique  - Identify and instruct in appropriate isolation precautions for identified infection/condition  Outcome: Progressing  Goal: Absence of fever/infection during neutropenic period  Description: INTERVENTIONS:  - Monitor WBC  - Perform strict hand hygiene  - Limit to healthy visitors only  - No plants, dried, fresh or silk flowers with garcia in patient room  Outcome: Progressing     Problem: SAFETY ADULT  Goal: Patient will remain free of falls  Description: INTERVENTIONS:  - Educate patient/family on patient safety including physical limitations  - Instruct patient to call for assistance with activity   -  Consider consulting OT/PT to assist with strengthening/mobility based on AM PAC & JH-HLM score  - Consult OT/PT to assist with strengthening/mobility   - Keep Call bell within reach  - Keep bed low and locked with side rails adjusted as appropriate  - Keep care items and personal belongings within reach  - Initiate and maintain comfort rounds  - Make Fall Risk Sign visible to staff  - Offer Toileting every  Hours, in advance of need  - Initiate/Maintain alarm  - Obtain necessary fall risk management equipment:   - Apply yellow socks and bracelet for high fall risk patients  - Consider moving patient to room near nurses station  Outcome: Progressing  Goal: Maintain or return to baseline ADL function  Description: INTERVENTIONS:  -  Assess patient's ability to carry out ADLs; assess patient's baseline for ADL function and identify physical deficits which impact ability to perform ADLs (bathing, care of mouth/teeth, toileting, grooming, dressing, etc.)  - Assess/evaluate cause of self-care deficits   - Assess range of motion  - Assess patient's mobility; develop plan if impaired  - Assess patient's need for assistive devices and provide as appropriate  - Encourage maximum independence but intervene and supervise when necessary  - Involve family in performance of ADLs  - Assess for home care needs following discharge   - Consider OT consult to assist with ADL evaluation and planning for discharge  - Provide patient education as appropriate  - Monitor functional capacity and physical performance, use of AM PAC & JH-HLM   - Monitor gait, balance and fatigue with ambulation    Outcome: Progressing  Goal: Maintains/Returns to pre admission functional level  Description: INTERVENTIONS:  - Perform AM-PAC 6 Click Basic Mobility/ Daily Activity assessment daily.  - Set and communicate daily mobility goal to care team and patient/family/caregiver.   - Collaborate with rehabilitation services on mobility goals if consulted  -  Perform Range of Motion  times a day.  - Reposition patient every  hours.  - Dangle patient  times a day  - Stand patient  times a day  - Ambulate patient  times a day  - Out of bed to chair  times a day   - Out of bed for meal times a day  - Out of bed for toileting  - Record patient progress and toleration of activity level   Outcome: Progressing     Problem: DISCHARGE PLANNING  Goal: Discharge to home or other facility with appropriate resources  Description: INTERVENTIONS:  - Identify barriers to discharge w/patient and caregiver  - Arrange for needed discharge resources and transportation as appropriate  - Identify discharge learning needs (meds, wound care, etc.)  - Arrange for interpretive services to assist at discharge as needed  - Refer to Case Management Department for coordinating discharge planning if the patient needs post-hospital services based on physician/advanced practitioner order or complex needs related to functional status, cognitive ability, or social support system  Outcome: Progressing     Problem: Knowledge Deficit  Goal: Patient/family/caregiver demonstrates understanding of disease process, treatment plan, medications, and discharge instructions  Description: Complete learning assessment and assess knowledge base.  Interventions:  - Provide teaching at level of understanding  - Provide teaching via preferred learning methods  Outcome: Progressing

## 2025-07-18 NOTE — ASSESSMENT & PLAN NOTE
Reports chronic daily alcohol use, though has worsened over the last few weeks with last drink 7/13  Reported to SLIM she drinks about 3 shots liquor daily, reported to ED she sometimes drinks up to 12 shots daily  Denies history of withdrawal seizures  CIWA protocol, continue PTA gabapentin  Thiamine, folate, multivitamin supplementation  Case management consult for CATCH evaluation, patient declines transfer to detox unit at this time

## 2025-07-18 NOTE — ANESTHESIA POSTPROCEDURE EVALUATION
Post-Op Assessment Note    CV Status:  Stable    Pain management: adequate       Mental Status:  Alert and awake   Hydration Status:  Euvolemic   PONV Controlled:  Controlled   Airway Patency:  Patent     Post Op Vitals Reviewed: Yes    No anethesia notable event occurred.    Staff: Anesthesiologist           Last Filed PACU Vitals:  Vitals Value Taken Time   Temp 97.6 °F (36.4 °C) 07/16/25 12:20   Pulse 95 07/16/25 12:40   /62 07/16/25 12:40   Resp 12 07/16/25 12:40   SpO2 99 % 07/16/25 12:40       Modified Kristy:     Vitals Value Taken Time   Activity 2 07/16/25 12:40   Respiration 2 07/16/25 12:40   Circulation 2 07/16/25 12:40   Consciousness 1 07/16/25 12:40   Oxygen Saturation 2 07/16/25 12:40     Modified Kristy Score: 9

## 2025-07-18 NOTE — PROGRESS NOTES
Progress Note - Surgery-General   Name: Shayla Davies 35 y.o. female I MRN: [id number]  Unit/Bed#: [facility] I Date of Admission: 7/14/2025   Date of Service: 7/18/2025 I Hospital Day: 2    Assessment & Plan  Epigastric pain  Epigastric pain x 2 weeks  Pain is 9 out of 10 this morning  Nausea but no vomiting today  Denies any fevers or chills  Abd soft, nondistended, tender to palpation of epigastric and right upper quadrant, normal active bowel sounds    AFVSS  WBC 8.31 (4.65)  Lipase 86 on 7/14, 192 on 7/11  T. bili 0.43 (0.89)  Alk phos 137 (129)    N.p.o.  Preop antibiotics ordered  As needed pain control  As needed antiemetics  Ambulate as tolerated  DVT prophylaxis  Robotic cholecystectomy today with Dr. Castrejon  History of bariatric surgery  Reports gastric bypass in 2010  Nicotine dependence  Decline nicotine patch  Management per primary  Bipolar disorder (HCC)  On Keppra, Seroquel, Wellbutrin, Atarax, sinequan  Management per primary  Alcohol use disorder  On CIWA protocol  Thiamine, folate, multivitamin supplement  Management per primary      Surgery-General service will follow.    Subjective   Patient seen and examined at bedside today.  Patient reports no acute overnight events.  Patient still with persistent nausea this morning but denies vomiting.    Objective :  Temp:  [97.8 °F (36.6 °C)-98.2 °F (36.8 °C)] 98.2 °F (36.8 °C)  HR:  [] 110  BP: (103-111)/(54-73) 103/73  Resp:  [14-17] 14  SpO2:  [99 %-100 %] 100 %  O2 Device: None (Room air)    I/O         07/16 0701  07/17 0700 07/17 0701  07/18 0700 07/18 0701  07/19 0700    P.O. 720 300     I.V. (mL/kg) 30 (0.4)      Total Intake(mL/kg) 750 (8.8) 300 (3.5)     Urine (mL/kg/hr)       Total Output       Net +750 +300            Unmeasured Urine Occurrence 3 x 2 x             Physical Exam  Constitutional:       Appearance: She is obese.   HENT:      Head: Normocephalic and atraumatic.      Nose: Nose normal.      Mouth/Throat:      Mouth:  Mucous membranes are moist.      Pharynx: Oropharynx is clear.     Eyes:      Extraocular Movements: Extraocular movements intact.      Conjunctiva/sclera: Conjunctivae normal.      Pupils: Pupils are equal, round, and reactive to light.       Cardiovascular:      Rate and Rhythm: Normal rate.      Pulses: Normal pulses.      Heart sounds: Normal heart sounds.   Pulmonary:      Effort: Pulmonary effort is normal.   Abdominal:      General: Abdomen is flat. There is no distension.      Palpations: Abdomen is soft.      Tenderness: There is abdominal tenderness (Epigastric and RUQ area). There is no guarding or rebound.      Comments: Normoactive bowel sounds     Musculoskeletal:         General: Normal range of motion.      Cervical back: Normal range of motion and neck supple.     Skin:     General: Skin is warm and dry.     Neurological:      Mental Status: She is alert and oriented to person, place, and time.     Psychiatric:         Mood and Affect: Mood normal.         Behavior: Behavior normal.         Thought Content: Thought content normal.         Judgment: Judgment normal.           Lab Results: I have reviewed the following results:  Recent Labs     07/18/25  0621   WBC 8.31   HGB 12.9   HCT 41.9      SODIUM 139   K 4.0      CO2 30   BUN 9   CREATININE 1.06   GLUC 95   AST 26   ALT 18   ALB 3.8   TBILI 0.43   ALKPHOS 137*       Imaging Results Review: No pertinent imaging studies reviewed.  Other Study Results Review: No additional pertinent studies reviewed.    VTE Pharmacologic Prophylaxis: VTE covered by:    None     VTE Mechanical Prophylaxis: sequential compression device

## 2025-07-18 NOTE — ANESTHESIA POSTPROCEDURE EVALUATION
Post-Op Assessment Note    CV Status:  Stable  Pain Score: 0    Pain management: adequate       Mental Status:  Sleepy and arousable   Hydration Status:  Euvolemic   PONV Controlled:  Controlled   Airway Patency:  Patent     Post Op Vitals Reviewed: Yes    No anethesia notable event occurred.    Staff: CRNA           Last Filed PACU Vitals:  Vitals Value Taken Time   Temp 97.4    Pulse 95 07/18/25 12:23   /58 07/18/25 12:22   Resp 14 07/18/25 12:23   SpO2 99 % 07/18/25 12:23   Vitals shown include unfiled device data.

## 2025-07-18 NOTE — ASSESSMENT & PLAN NOTE
Patient reports breaking/chipping one of her maxillary molars on the left side has been causing significant pain, particularly when he rubs against her denture  Reports using Orajel OTC at home, did not have much relief with Magic mouthwash inpatient  No concern for dental abscess/infection on exam  Apply topical benzocaine today, recommend close outpatient F/U with dentist upon discharge

## 2025-07-18 NOTE — ASSESSMENT & PLAN NOTE
Patient presents with nausea, vomiting, epigastric pain for 2 weeks. Recently seen in ED multiple times for similar with concerns for pancreatitis but left AMA.  Differentials: Gastritis versus marginal ulcer due to previous bariatric surgery versus biliary colic  CT a/p showed no evidence of acute pancreatitis  Lipase 86 on admission (improved from 192 on 7/11)  EGD (7/16): Irregular Z-line, mild generalized erythematous mucosa in gastric pouch as well as small amount of food bolus. Gastric suture site normal, no ulcers.  GI following, maintaining on PPI BID, IVFs, antiemetics  Follow-up RUQ US still showing cholelithiasis with CBD measuring 6 mm, upper limits of normal & stable from prior  General Surgery consulted, plan for cholecystectomy today

## 2025-07-19 VITALS
OXYGEN SATURATION: 100 % | DIASTOLIC BLOOD PRESSURE: 81 MMHG | BODY MASS INDEX: 31.32 KG/M2 | WEIGHT: 188 LBS | SYSTOLIC BLOOD PRESSURE: 124 MMHG | RESPIRATION RATE: 16 BRPM | HEIGHT: 65 IN | HEART RATE: 99 BPM | TEMPERATURE: 98 F

## 2025-07-19 PROCEDURE — 99239 HOSP IP/OBS DSCHRG MGMT >30: CPT

## 2025-07-19 RX ORDER — OXYCODONE HYDROCHLORIDE 5 MG/1
5 TABLET ORAL EVERY 4 HOURS PRN
Qty: 8 TABLET | Refills: 0 | Status: SHIPPED | OUTPATIENT
Start: 2025-07-19 | End: 2025-07-21

## 2025-07-19 RX ADMIN — PANTOPRAZOLE SODIUM 40 MG: 40 INJECTION, POWDER, FOR SOLUTION INTRAVENOUS at 08:59

## 2025-07-19 RX ADMIN — OXYCODONE HYDROCHLORIDE 5 MG: 5 TABLET ORAL at 08:59

## 2025-07-19 RX ADMIN — MORPHINE SULFATE 2 MG: 2 INJECTION, SOLUTION INTRAMUSCULAR; INTRAVENOUS at 05:56

## 2025-07-19 RX ADMIN — GABAPENTIN 800 MG: 400 CAPSULE ORAL at 08:58

## 2025-07-19 RX ADMIN — THIAMINE HCL TAB 100 MG 100 MG: 100 TAB at 08:59

## 2025-07-19 RX ADMIN — BUPROPION HYDROCHLORIDE 150 MG: 150 TABLET, FILM COATED, EXTENDED RELEASE ORAL at 08:58

## 2025-07-19 RX ADMIN — QUETIAPINE FUMARATE 100 MG: 100 TABLET ORAL at 08:58

## 2025-07-19 RX ADMIN — Medication 1 TABLET: at 08:59

## 2025-07-19 RX ADMIN — LEVETIRACETAM 750 MG: 250 TABLET, FILM COATED ORAL at 08:58

## 2025-07-19 RX ADMIN — OXYCODONE HYDROCHLORIDE 5 MG: 5 TABLET ORAL at 04:38

## 2025-07-19 RX ADMIN — FOLIC ACID 1 MG: 1 TABLET ORAL at 08:58

## 2025-07-19 NOTE — ASSESSMENT & PLAN NOTE
Reports chronic daily alcohol use, though has worsened over the last few weeks with last drink 7/13  Reported to SLIM she drinks about 3 shots liquor daily, reported to ED she sometimes drinks up to 12 shots daily  Denies history of withdrawal seizures  Continue PTA gabapentin  Thiamine, folate, multivitamin supplementation  Case management consult for CATCH evaluation, patient declines transfer to detox unit at this time

## 2025-07-19 NOTE — PLAN OF CARE
Problem: PAIN - ADULT  Goal: Verbalizes/displays adequate comfort level or baseline comfort level  Description: Interventions:  - Encourage patient to monitor pain and request assistance  - Assess pain using appropriate pain scale  - Administer analgesics as ordered based on type and severity of pain and evaluate response  - Implement non-pharmacological measures as appropriate and evaluate response  - Consider cultural and social influences on pain and pain management  - Notify physician/advanced practitioner if interventions unsuccessful or patient reports new pain  - Educate patient/family on pain management process including their role and importance of  reporting pain   - Provide non-pharmacologic/complimentary pain relief interventions  Outcome: Progressing     Problem: INFECTION - ADULT  Goal: Absence or prevention of progression during hospitalization  Description: INTERVENTIONS:  - Assess and monitor for signs and symptoms of infection  - Monitor lab/diagnostic results  - Monitor all insertion sites, i.e. indwelling lines, tubes, and drains  - Monitor endotracheal if appropriate and nasal secretions for changes in amount and color  - [place] appropriate cooling/warming therapies per order  - Administer medications as ordered  - Instruct and encourage patient and family to use good hand hygiene technique  - Identify and instruct in appropriate isolation precautions for identified infection/condition  Outcome: Progressing     Problem: DISCHARGE PLANNING  Goal: Discharge to home or other facility with appropriate resources  Description: INTERVENTIONS:  - Identify barriers to discharge w/patient and caregiver  - Arrange for needed discharge resources and transportation as appropriate  - Identify discharge learning needs (meds, wound care, etc.)  - Arrange for interpretive services to assist at discharge as needed  - Refer to Case Management Department for coordinating discharge planning if the patient needs  post-hospital services based on physician/advanced practitioner order or complex needs related to functional status, cognitive ability, or social support system  Outcome: Progressing     Problem: Knowledge Deficit  Goal: Patient/family/caregiver demonstrates understanding of disease process, treatment plan, medications, and discharge instructions  Description: Complete learning assessment and assess knowledge base.  Interventions:  - Provide teaching at level of understanding  - Provide teaching via preferred learning methods  Outcome: Progressing

## 2025-07-19 NOTE — DISCHARGE SUMMARY
Discharge Summary - Hospitalist   Name: Shayla Davies 35 y.o. female I MRN: [id number]  Unit/Bed#: [facility] I Date of Admission: 7/14/2025   Date of Service: 7/19/2025 I Hospital Day: 3     Assessment & Plan  Epigastric pain  Patient presents with nausea, vomiting, epigastric pain for 2 weeks. Recently seen in ED multiple times for similar with concerns for pancreatitis but left AMA.  Differentials: Gastritis versus marginal ulcer due to previous bariatric surgery versus biliary colic  CT a/p showed no evidence of acute pancreatitis  Lipase 86 on admission (improved from 192 on 7/11)  EGD (7/16): Irregular Z-line, mild generalized erythematous mucosa in gastric pouch as well as small amount of food bolus. Gastric suture site normal, no ulcers.  GI following, maintaining on PPI BID, IVFs, antiemetics  Follow-up RUQ US still showing cholelithiasis with CBD measuring 6 mm, upper limits of normal & stable from prior  S/p Lap Christine (7/18)  General Surgery following  Cleared for DC from surgical standpoint. Recommend pain management with Tylenol and PRN Oxy. Recommend outpatient follow up with Primary Surgeon  Alcohol use disorder  Reports chronic daily alcohol use, though has worsened over the last few weeks with last drink 7/13  Reported to SLIM she drinks about 3 shots liquor daily, reported to ED she sometimes drinks up to 12 shots daily  Denies history of withdrawal seizures  Continue PTA gabapentin  Thiamine, folate, multivitamin supplementation  Case management consult for CATCH evaluation, patient declines transfer to detox unit at this time  Pain due to dental trauma  Patient reports breaking/chipping one of her maxillary molars on the left side has been causing significant pain, particularly when he rubs against her denture  Reports using Orajel OTC at home, did not have much relief with Magic mouthwash inpatient  No concern for dental abscess/infection on exam  Apply topical benzocaine today, recommend  close outpatient F/U with dentist upon discharge  Bipolar disorder (HCC)  Continue PTA Keppra, Seroquel, Atarax, Sinequan, Wellbutrin  History of bariatric surgery  Remote history of gastric bypass many years ago  Nicotine dependence  Reports chronic vaping, declines nicotine patches     Medical Problems       Resolved Problems  Date Reviewed: 7/15/2025   None       Discharging Physician / Practitioner: SUKH Troncoso  PCP: Diana Evans MD  Admission Date:   Admission Orders (From admission, onward)       Ordered        07/16/25 1148  INPATIENT ADMISSION  Once            07/14/25 1616  Place in Observation  Once                          Discharge Date: 07/19/25    Next Steps for Physician/AP Assuming Care:  PCP for continued management of ongoing medical issues, including alcohol use disorder and dental pain  General Surgery evaluation for postoperative follow up  Dentist for evaluation of Dental trauma/Pain    Test Results Pending at Discharge (will require follow up):  None    Medication Changes for Discharge & Rationale:   Tylenol/Oxycodone for postoperative pain management  See after visit summary for reconciled discharge medications provided to patient and/or family.     Consultations During Hospital Stay:  GI  General Surgery    Procedures Performed:   S/p Lap Christine (7/18)    Significant Findings / Test Results:     US right upper quadrant   Final Result by Britta Webb MD (07/18 1030)      Cholelithiasis   CBD measures 6 mm, upper limits of the normal, stable from the previous CT from July 2, 2025. If ongoing concern for cholestasis MRI/MRCP can be considered      Workstation performed: [id number]         CT abdomen pelvis with contrast   Final Result by Fidel Odell MD (07/14 1520)      No CT evidence of acute pancreatitis or other sources of acute abdominal pain identified.      Status post gastric bypass surgery, with a dilated gastric pouch, better seen on the prior CT from 7/2/2025.          Workstation performed: [id number]              Incidental Findings:   None     Hospital Course:   Shayla Davies is a 35 y.o. female patient  with a PMH of alcohol use disorder, prior methamphetamine use, bipolar disorder, nicotine dependence who originally presented to the hospital on 7/14/2025 due to  persistent epigastric pain and nausea/vomiting, ongoing for the last 2 weeks. Patient reports constant, severe, 10/10 epigastric pain with some radiation to RUQ.  Patient does admit that her alcohol use has gotten worse recently, drinking daily though denies any severe withdrawal symptoms or seizures. Patient had been seen in the ED multiple times (7/2, 7/5, 7/11), was only stable for discharge on 7/5 and the other two encounters patient had left AMA.     Upon arrival to the ED, CT abdomen pelvis showed no evidence of acute pancreatitis.  Lipase remained negative.  GI was consulted.  EGD on 7/16 revealed irregular Z-line with mild generalized erythema mucosa in the gastric pouch as well as small amount of food bolus, gastric suture site normal with no ulcers.  Repeat right upper quadrant ultrasound showed cholelithiasis with CBD measuring 6 mm.  General surgery was consulted.  Patient underwent laparoscopic cholecystectomy on 7/18.  Discussed with both GI and general surgery, cleared for discharge this morning.  Recommend outpatient follow-up with PCP and general surgery for continued management of ongoing medical conditions.  Patient with known history of alcohol use, recommended catch follow-up in the outpatient setting.  Patient declined transfer to detox at this time.  Patient did note recent dental pain, recommend outpatient follow-up with dentist for further evaluation of ongoing discomfort.    Discussed plan with patient and patient's significant other at bedside, all questions were answered.Please see above list of diagnoses and related plan for additional information.     Discharge Day Visit / Exam:  "  Subjective:  Patient stating she has some abdominal pain. Pt stating she is not passing gas yet but wants to start getting up and moving. Patient denies any chest pain or shortness of breath.     Vitals: Blood Pressure: 124/81 (07/19/25 0700)  Pulse: 99 (07/19/25 0700)  Temperature: 98 °F (36.7 °C) (07/19/25 0700)  Temp Source: Tympanic (07/19/25 0300)  Respirations: 16 (07/19/25 0700)  Height: 5' 5\" (165.1 cm) (07/16/25 1109)  Weight - Scale: 85.3 kg (188 lb) (07/16/25 1109)  SpO2: 100 % (07/19/25 0700)  Physical Exam  Vitals and nursing note reviewed.   Constitutional:       General: She is not in acute distress.     Appearance: Normal appearance.   HENT:      Head: Normocephalic.      Nose: Nose normal. No congestion.      Mouth/Throat:      Mouth: Mucous membranes are moist.      Pharynx: Oropharynx is clear.     Cardiovascular:      Rate and Rhythm: Normal rate and regular rhythm.      Pulses: Normal pulses.      Heart sounds: No murmur heard.  Pulmonary:      Effort: Pulmonary effort is normal. No respiratory distress.   Abdominal:      General: Bowel sounds are decreased.      Palpations: Abdomen is soft.      Tenderness: There is abdominal tenderness.      Comments: Incisions, CDI     Musculoskeletal:         General: Normal range of motion.      Cervical back: Normal range of motion.      Right lower leg: No edema.      Left lower leg: No edema.     Skin:     General: Skin is warm and dry.      Capillary Refill: Capillary refill takes less than 2 seconds.     Neurological:      Mental Status: She is alert and oriented to person, place, and time. Mental status is at baseline.      Motor: No weakness.     Psychiatric:         Mood and Affect: Mood normal.         Speech: Speech normal.         Behavior: Behavior is cooperative.          Discussion with Family: Updated  (significant other) at bedside.    Discharge instructions/Information to patient and family:   See after visit summary for " information provided to patient and family.      Provisions for Follow-Up Care:  See after visit summary for information related to follow-up care and any pertinent home health orders.      Mobility at time of Discharge:   Basic Mobility Inpatient Raw Score: 24  JH-HLM Goal: 8: Walk 250 feet or more  JH-HLM Achieved: 8: Walk 250 feet ot more  HLM Goal achieved. Continue to encourage appropriate mobility.     Disposition:   Home    Planned Readmission: No    Administrative Statements   Discharge Statement:  I have spent a total time of 35 minutes in caring for this patient on the day of the visit/encounter. >30 minutes of time was spent on: Diagnostic results, Prognosis, Risks and benefits of tx options, Instructions for management, Patient and family education, Importance of tx compliance, Risk factor reductions, Impressions, Counseling / Coordination of care, Documenting in the medical record, Reviewing / ordering tests, medicine, procedures  , and Communicating with other healthcare professionals .    **Please Note: This note may have been constructed using a voice recognition system**

## 2025-07-19 NOTE — DISCHARGE INSTR - AVS FIRST PAGE
Postoperative Care Instructions      1. General: You may feel pulling sensations around the wound or funny aches and pains around the incisions. This is normal. Even minor surgery is a change in your body and this is your body's reaction to it. If you have had abdominal surgery, it may help to support the incision with a small pillow or blanket for comfort when moving or coughing.    2. Wound care:  The glue over the incisions will fall off over the next week or two.     3. Showering: You may shower again 24 hours after surgery. Please do not soak wound in standing water such as a bath, hot tub, pool, lake, etc. Do not scrub or use exfoliants on the surgical wounds.    4. Activity: You may go up and down stairs, walk as much as you are comfortable, but walk at least 3 times each day. If you have had abdominal surgery, do not perform any strenuous exercise or lift anything heavier than 15-20 pounds for at least 3-4 weeks, unless cleared by your physician.    5. Diet: You may resume your regular diet. Please drink lots of water.    6. Medications:     Take extra strength Tylenol, 2 pills, 3 times a day regularly for the next 3 to 4 days.  Take Roxicodone, in addition to Tylenol, if you need further pain control.  A cold compress may also help with discomfort.    7. Driving: You will need someone to drive you home on the day of surgery. Do not drive or make any important decisions while on narcotic pain medication. Generally, you may drive 48 hours after you've stopped taking all narcotic pain medications.    8. Upset Stomach: You may take Maalox, Tums, or similar items for an upset stomach. If your narcotic pain medication causes an upset stomach, do not take it on an empty stomach. Try taking it with at least some crackers or toast.     9. Constipation: Patients often experience constipation after surgery, especially if taking narcotic pain medications. We recommend starting an over-the-counter medication for  this, such as Metamucil, Senokot, Colace, milk of magnesia, etc. You may stop taking these medications a couple days after your last dose of narcotic medication. If you experience significant nausea or vomiting after abdominal surgery, call the office before trying any of these medications.     10. Call the office: If you are experiencing any of the following: fevers above 101.5°, significant nausea or vomiting, if the wound develops drainage and/or excessive redness around the wound, or if you have significant diarrhea or other worsening symptoms.    11. Pain: A prescription for narcotic pain medication will be sent to your pharmacy upon discharge from the hospital. Please only take this medication if absolutely necessary. Please return extra narcotics to your local pharmacy.    12.  Call the office on Monday to make a follow-up appointment with Dr. Catsrejon in 10 to 14 days.

## 2025-07-19 NOTE — ASSESSMENT & PLAN NOTE
Patient presents with nausea, vomiting, epigastric pain for 2 weeks. Recently seen in ED multiple times for similar with concerns for pancreatitis but left AMA.  Differentials: Gastritis versus marginal ulcer due to previous bariatric surgery versus biliary colic  CT a/p showed no evidence of acute pancreatitis  Lipase 86 on admission (improved from 192 on 7/11)  EGD (7/16): Irregular Z-line, mild generalized erythematous mucosa in gastric pouch as well as small amount of food bolus. Gastric suture site normal, no ulcers.  GI following, maintaining on PPI BID, IVFs, antiemetics  Follow-up RUQ US still showing cholelithiasis with CBD measuring 6 mm, upper limits of normal & stable from prior  S/p Lap Christine (7/18)  General Surgery following  Cleared for DC from surgical standpoint. Recommend pain management with Tylenol and PRN Oxy. Recommend outpatient follow up with Primary Surgeon

## 2025-07-19 NOTE — NURSING NOTE
Reviewed AVS with pt. All questions answered at this time. Pt declined wheelchair and ambulated with spouse to main lobby with belongings.

## 2025-07-21 ENCOUNTER — TRANSITIONAL CARE MANAGEMENT (OUTPATIENT)
Dept: FAMILY MEDICINE CLINIC | Facility: CLINIC | Age: 35
End: 2025-07-21

## 2025-07-21 DIAGNOSIS — R10.13 EPIGASTRIC PAIN: ICD-10-CM

## 2025-07-21 DIAGNOSIS — K80.20 CHOLELITHIASIS: ICD-10-CM

## 2025-07-21 PROCEDURE — 88305 TISSUE EXAM BY PATHOLOGIST: CPT | Performed by: PATHOLOGY

## 2025-07-21 RX ORDER — OXYCODONE HYDROCHLORIDE 5 MG/1
5 TABLET ORAL EVERY 4 HOURS PRN
Qty: 5 TABLET | Refills: 0 | Status: SHIPPED | OUTPATIENT
Start: 2025-07-21 | End: 2025-08-16

## 2025-07-21 NOTE — ANESTHESIA POSTPROCEDURE EVALUATION
Post-Op Assessment Note    CV Status:  Stable  Pain Score: 0    Pain management: adequate       Mental Status:  Alert and awake   Hydration Status:  Stable   PONV Controlled:  Controlled   Airway Patency:  Patent  Airway: intubated     Post Op Vitals Reviewed: Yes    No anethesia notable event occurred.    Staff: Anesthesiologist           Last Filed PACU Vitals:  Vitals Value Taken Time   Temp 98.3 °F (36.8 °C) 07/18/25 14:20   Pulse 75 07/18/25 14:30   BP 90/66 07/18/25 14:24   Resp 25 07/18/25 14:21   SpO2 85 % 07/18/25 14:26   Vitals shown include unfiled device data.    Modified Kristy:     Vitals Value Taken Time   Activity 2 07/18/25 14:20   Respiration 2 07/18/25 14:20   Circulation 2 07/18/25 14:20   Consciousness 2 07/18/25 14:20   Oxygen Saturation 2 07/18/25 14:20     Modified Kristy Score: 10

## 2025-07-21 NOTE — UTILIZATION REVIEW
NOTIFICATION OF ADMISSION DISCHARGE   This is a Notification of Discharge from [facility]. Please be advised that this patient has been discharge from our facility. Below you will find the admission and discharge date and time including the patient’s disposition.   UTILIZATION REVIEW CONTACT:  Utilization Review Assistants  Network Utilization Review Department  Phone: [phone] x carefully listen to the prompts. All voicemails are confidential.  Email: [email or web address]     ADMISSION INFORMATION  PRESENTATION DATE: 7/14/2025 12:19 PM  OBERVATION ADMISSION DATE: 07/14/2025 1616  INPATIENT ADMISSION DATE: 7/16/25 11:49 AM   DISCHARGE DATE: 7/19/2025 12:00 PM   DISPOSITION:Home/Self Care    Network Utilization Review Department  ATTENTION: Please call with any questions or concerns to [phone] and carefully listen to the prompts so that you are directed to the right person. All voicemails are confidential.   For Discharge needs, contact Care Management DC Support Team at [phone] opt. [phone]  Send all requests for admission clinical reviews, approved or denied determinations and any other requests to dedicated fax number below belonging to the campus where the patient is receiving treatment. List of dedicated fax numbers for the Facilities:  FACILITY NAME UR FAX NUMBER   ADMISSION DENIALS (Administrative/Medical Necessity) [phone]   DISCHARGE SUPPORT TEAM ([facility]) [phone]   PARENT CHILD HEALTH (Maternity/NICU/Pediatrics) [phone]   [facility] [phone]   [facility] [phone]   [facility] [phone]   [facility] [phone]   [facility] [phone]   [facility] [phone]   [facility] [phone]   [facility] [phone]    [facility] [phone]   [facility] [phone]   [facility] [phone]   [facility] [phone]

## 2025-07-24 PROCEDURE — 88304 TISSUE EXAM BY PATHOLOGIST: CPT | Performed by: STUDENT IN AN ORGANIZED HEALTH CARE EDUCATION/TRAINING PROGRAM

## 2025-07-25 ENCOUNTER — APPOINTMENT (EMERGENCY)
Dept: CT IMAGING | Facility: HOSPITAL | Age: 35
End: 2025-07-25
Payer: MEDICARE

## 2025-07-25 ENCOUNTER — APPOINTMENT (EMERGENCY)
Dept: MRI IMAGING | Facility: HOSPITAL | Age: 35
End: 2025-07-25
Payer: MEDICARE

## 2025-07-25 ENCOUNTER — HOSPITAL ENCOUNTER (EMERGENCY)
Facility: HOSPITAL | Age: 35
Discharge: HOME | End: 2025-07-26
Attending: EMERGENCY MEDICINE | Admitting: EMERGENCY MEDICINE
Payer: MEDICARE

## 2025-07-25 DIAGNOSIS — R74.8 ELEVATED LIPASE: ICD-10-CM

## 2025-07-25 DIAGNOSIS — R10.9 ABDOMINAL PAIN: Primary | ICD-10-CM

## 2025-07-25 DIAGNOSIS — R10.13 EPIGASTRIC PAIN: ICD-10-CM

## 2025-07-25 DIAGNOSIS — R11.2 NAUSEA AND VOMITING, UNSPECIFIED VOMITING TYPE: ICD-10-CM

## 2025-07-25 DIAGNOSIS — R11.2 NAUSEA AND VOMITING: ICD-10-CM

## 2025-07-25 DIAGNOSIS — E87.20 LACTIC ACIDOSIS: ICD-10-CM

## 2025-07-25 DIAGNOSIS — F10.10 ALCOHOL ABUSE: ICD-10-CM

## 2025-07-25 DIAGNOSIS — K80.20 CHOLELITHIASIS: ICD-10-CM

## 2025-07-25 DIAGNOSIS — R11.2 INTRACTABLE NAUSEA AND VOMITING: ICD-10-CM

## 2025-07-25 DIAGNOSIS — R74.01 TRANSAMINITIS: ICD-10-CM

## 2025-07-25 PROBLEM — R10.84 GENERALIZED ABDOMINAL PAIN: Status: ACTIVE | Noted: 2025-07-25

## 2025-07-25 LAB
ALBUMIN SERPL BCG-MCNC: 4.3 G/DL (ref 3.5–5)
ALP SERPL-CCNC: 246 U/L (ref 34–104)
ALT SERPL W P-5'-P-CCNC: 160 U/L (ref 7–52)
ANION GAP SERPL CALCULATED.3IONS-SCNC: 12 MMOL/L (ref 4–13)
AST SERPL W P-5'-P-CCNC: 626 U/L (ref 13–39)
ATRIAL RATE: 91 BPM
BASOPHILS # BLD AUTO: 0.1 THOUSANDS/ÂΜL (ref 0–0.1)
BASOPHILS NFR BLD AUTO: 1 % (ref 0–1)
BILIRUB SERPL-MCNC: 0.59 MG/DL (ref 0.2–1)
BILIRUB UR QL STRIP: NEGATIVE
BUN SERPL-MCNC: 13 MG/DL (ref 5–25)
CALCIUM SERPL-MCNC: 9 MG/DL (ref 8.4–10.2)
CARDIAC TROPONIN I PNL SERPL HS: <2 NG/L (ref ?–50)
CARDIAC TROPONIN I PNL SERPL HS: <2 NG/L (ref ?–50)
CHLORIDE SERPL-SCNC: 99 MMOL/L (ref 96–108)
CLARITY UR: CLEAR
CO2 SERPL-SCNC: 26 MMOL/L (ref 21–32)
COLOR UR: YELLOW
CREAT SERPL-MCNC: 1.05 MG/DL (ref 0.6–1.3)
EOSINOPHIL # BLD AUTO: 0.16 THOUSAND/ÂΜL (ref 0–0.61)
EOSINOPHIL NFR BLD AUTO: 1 % (ref 0–6)
ERYTHROCYTE [DISTWIDTH] IN BLOOD BY AUTOMATED COUNT: 17.1 % (ref 11.6–15.1)
EXT PREGNANCY TEST URINE: NEGATIVE
EXT. CONTROL: NORMAL
GFR SERPL CREATININE-BSD FRML MDRD: 68 ML/MIN/1.73SQ M
GLUCOSE SERPL-MCNC: 91 MG/DL (ref 65–140)
GLUCOSE UR STRIP-MCNC: NEGATIVE MG/DL
HCG SERPL QL: NEGATIVE
HCT VFR BLD AUTO: 41.1 % (ref 34.8–46.1)
HGB BLD-MCNC: 13.2 G/DL (ref 11.5–15.4)
HGB UR QL STRIP.AUTO: NEGATIVE
IMM GRANULOCYTES # BLD AUTO: 0.07 THOUSAND/UL (ref 0–0.2)
IMM GRANULOCYTES NFR BLD AUTO: 1 % (ref 0–2)
KETONES UR STRIP-MCNC: NEGATIVE MG/DL
LACTATE SERPL-SCNC: 1.4 MMOL/L (ref 0.5–2)
LACTATE SERPL-SCNC: 1.6 MMOL/L (ref 0.5–2)
LACTATE SERPL-SCNC: 2.3 MMOL/L (ref 0.5–2)
LACTATE SERPL-SCNC: 2.6 MMOL/L (ref 0.5–2)
LACTATE SERPL-SCNC: 2.8 MMOL/L (ref 0.5–2)
LEUKOCYTE ESTERASE UR QL STRIP: NEGATIVE
LIPASE SERPL-CCNC: 170 U/L (ref 11–82)
LYMPHOCYTES # BLD AUTO: 1.62 THOUSANDS/ÂΜL (ref 0.6–4.47)
LYMPHOCYTES NFR BLD AUTO: 12 % (ref 14–44)
MAGNESIUM SERPL-MCNC: 2 MG/DL (ref 1.9–2.7)
MCH RBC QN AUTO: 27.5 PG (ref 26.8–34.3)
MCHC RBC AUTO-ENTMCNC: 32.1 G/DL (ref 31.4–37.4)
MCV RBC AUTO: 86 FL (ref 82–98)
MONOCYTES # BLD AUTO: 1.34 THOUSAND/ÂΜL (ref 0.17–1.22)
MONOCYTES NFR BLD AUTO: 10 % (ref 4–12)
NEUTROPHILS # BLD AUTO: 9.77 THOUSANDS/ÂΜL (ref 1.85–7.62)
NEUTS SEG NFR BLD AUTO: 75 % (ref 43–75)
NITRITE UR QL STRIP: NEGATIVE
NRBC BLD AUTO-RTO: 0 /100 WBCS
P AXIS: 47 DEGREES
PH UR STRIP.AUTO: 6 [PH]
PLATELET # BLD AUTO: 353 THOUSANDS/UL (ref 149–390)
PMV BLD AUTO: 8.7 FL (ref 8.9–12.7)
POTASSIUM SERPL-SCNC: 4.4 MMOL/L (ref 3.5–5.3)
PR INTERVAL: 144 MS
PROT SERPL-MCNC: 7.9 G/DL (ref 6.4–8.4)
PROT UR STRIP-MCNC: NEGATIVE MG/DL
QRS AXIS: 26 DEGREES
QRSD INTERVAL: 78 MS
QT INTERVAL: 378 MS
QTC INTERVAL: 464 MS
RBC # BLD AUTO: 4.8 MILLION/UL (ref 3.81–5.12)
SODIUM SERPL-SCNC: 137 MMOL/L (ref 135–147)
SP GR UR STRIP.AUTO: >=1.03 (ref 1–1.03)
T WAVE AXIS: 32 DEGREES
UROBILINOGEN UR QL STRIP.AUTO: 0.2 E.U./DL
VENTRICULAR RATE: 91 BPM
WBC # BLD AUTO: 13.06 THOUSAND/UL (ref 4.31–10.16)

## 2025-07-25 PROCEDURE — 84703 CHORIONIC GONADOTROPIN ASSAY: CPT | Performed by: EMERGENCY MEDICINE

## 2025-07-25 PROCEDURE — 74183 MRI ABD W/O CNTR FLWD CNTR: CPT

## 2025-07-25 PROCEDURE — 96365 THER/PROPH/DIAG IV INF INIT: CPT

## 2025-07-25 PROCEDURE — 99222 1ST HOSP IP/OBS MODERATE 55: CPT | Performed by: INTERNAL MEDICINE

## 2025-07-25 PROCEDURE — 81003 URINALYSIS AUTO W/O SCOPE: CPT | Performed by: EMERGENCY MEDICINE

## 2025-07-25 PROCEDURE — 96366 THER/PROPH/DIAG IV INF ADDON: CPT

## 2025-07-25 PROCEDURE — 83605 ASSAY OF LACTIC ACID: CPT | Performed by: INTERNAL MEDICINE

## 2025-07-25 PROCEDURE — 83605 ASSAY OF LACTIC ACID: CPT | Performed by: EMERGENCY MEDICINE

## 2025-07-25 PROCEDURE — 96361 HYDRATE IV INFUSION ADD-ON: CPT

## 2025-07-25 PROCEDURE — A9585 GADOBUTROL INJECTION: HCPCS | Performed by: EMERGENCY MEDICINE

## 2025-07-25 PROCEDURE — 84484 ASSAY OF TROPONIN QUANT: CPT | Performed by: EMERGENCY MEDICINE

## 2025-07-25 PROCEDURE — 96375 TX/PRO/DX INJ NEW DRUG ADDON: CPT

## 2025-07-25 PROCEDURE — 85025 COMPLETE CBC W/AUTO DIFF WBC: CPT | Performed by: EMERGENCY MEDICINE

## 2025-07-25 PROCEDURE — 96376 TX/PRO/DX INJ SAME DRUG ADON: CPT

## 2025-07-25 PROCEDURE — 87040 BLOOD CULTURE FOR BACTERIA: CPT | Performed by: EMERGENCY MEDICINE

## 2025-07-25 PROCEDURE — 99284 EMERGENCY DEPT VISIT MOD MDM: CPT

## 2025-07-25 PROCEDURE — 83690 ASSAY OF LIPASE: CPT | Performed by: EMERGENCY MEDICINE

## 2025-07-25 PROCEDURE — 74177 CT ABD & PELVIS W/CONTRAST: CPT

## 2025-07-25 PROCEDURE — 93005 ELECTROCARDIOGRAM TRACING: CPT

## 2025-07-25 PROCEDURE — 80053 COMPREHEN METABOLIC PANEL: CPT | Performed by: EMERGENCY MEDICINE

## 2025-07-25 PROCEDURE — 93010 ELECTROCARDIOGRAM REPORT: CPT | Performed by: INTERNAL MEDICINE

## 2025-07-25 PROCEDURE — 99285 EMERGENCY DEPT VISIT HI MDM: CPT | Performed by: EMERGENCY MEDICINE

## 2025-07-25 PROCEDURE — 36415 COLL VENOUS BLD VENIPUNCTURE: CPT | Performed by: EMERGENCY MEDICINE

## 2025-07-25 PROCEDURE — 81025 URINE PREGNANCY TEST: CPT | Performed by: EMERGENCY MEDICINE

## 2025-07-25 PROCEDURE — 83735 ASSAY OF MAGNESIUM: CPT | Performed by: EMERGENCY MEDICINE

## 2025-07-25 RX ORDER — MORPHINE SULFATE 4 MG/ML
4 INJECTION, SOLUTION INTRAMUSCULAR; INTRAVENOUS ONCE
Status: COMPLETED | OUTPATIENT
Start: 2025-07-25 | End: 2025-07-25

## 2025-07-25 RX ORDER — MORPHINE SULFATE 4 MG/ML
4 INJECTION, SOLUTION INTRAMUSCULAR; INTRAVENOUS EVERY 4 HOURS PRN
Status: DISCONTINUED | OUTPATIENT
Start: 2025-07-25 | End: 2025-07-26

## 2025-07-25 RX ORDER — DOXEPIN HYDROCHLORIDE 100 MG/1
100 CAPSULE ORAL
Status: DISCONTINUED | OUTPATIENT
Start: 2025-07-25 | End: 2025-07-26 | Stop reason: HOSPADM

## 2025-07-25 RX ORDER — ONDANSETRON 2 MG/ML
4 INJECTION INTRAMUSCULAR; INTRAVENOUS EVERY 4 HOURS PRN
Status: DISCONTINUED | OUTPATIENT
Start: 2025-07-25 | End: 2025-07-26 | Stop reason: HOSPADM

## 2025-07-25 RX ORDER — ONDANSETRON 2 MG/ML
4 INJECTION INTRAMUSCULAR; INTRAVENOUS ONCE
Status: COMPLETED | OUTPATIENT
Start: 2025-07-25 | End: 2025-07-25

## 2025-07-25 RX ORDER — GADOBUTROL 604.72 MG/ML
8 INJECTION INTRAVENOUS
Status: COMPLETED | OUTPATIENT
Start: 2025-07-25 | End: 2025-07-25

## 2025-07-25 RX ORDER — QUETIAPINE FUMARATE 100 MG/1
100 TABLET, FILM COATED ORAL
Status: DISCONTINUED | OUTPATIENT
Start: 2025-07-25 | End: 2025-07-26 | Stop reason: HOSPADM

## 2025-07-25 RX ORDER — PANTOPRAZOLE SODIUM 40 MG/10ML
40 INJECTION, POWDER, LYOPHILIZED, FOR SOLUTION INTRAVENOUS EVERY 12 HOURS SCHEDULED
Status: DISCONTINUED | OUTPATIENT
Start: 2025-07-25 | End: 2025-07-26 | Stop reason: HOSPADM

## 2025-07-25 RX ORDER — QUETIAPINE FUMARATE 25 MG/1
50 TABLET, FILM COATED ORAL DAILY
Status: DISCONTINUED | OUTPATIENT
Start: 2025-07-25 | End: 2025-07-26 | Stop reason: HOSPADM

## 2025-07-25 RX ORDER — GABAPENTIN 400 MG/1
800 CAPSULE ORAL 3 TIMES DAILY
Status: DISCONTINUED | OUTPATIENT
Start: 2025-07-25 | End: 2025-07-26 | Stop reason: HOSPADM

## 2025-07-25 RX ORDER — BUPROPION HYDROCHLORIDE 150 MG/1
150 TABLET ORAL DAILY
Status: DISCONTINUED | OUTPATIENT
Start: 2025-07-26 | End: 2025-07-26 | Stop reason: HOSPADM

## 2025-07-25 RX ORDER — DEXTROSE MONOHYDRATE AND SODIUM CHLORIDE 5; .45 G/100ML; G/100ML
150 INJECTION, SOLUTION INTRAVENOUS CONTINUOUS
Status: DISCONTINUED | OUTPATIENT
Start: 2025-07-25 | End: 2025-07-26 | Stop reason: HOSPADM

## 2025-07-25 RX ORDER — ONDANSETRON 2 MG/ML
4 INJECTION INTRAMUSCULAR; INTRAVENOUS ONCE
Status: DISCONTINUED | OUTPATIENT
Start: 2025-07-25 | End: 2025-07-26 | Stop reason: HOSPADM

## 2025-07-25 RX ADMIN — ONDANSETRON 4 MG: 2 INJECTION INTRAMUSCULAR; INTRAVENOUS at 22:10

## 2025-07-25 RX ADMIN — DEXTROSE AND SODIUM CHLORIDE 150 ML/HR: 5; .45 INJECTION, SOLUTION INTRAVENOUS at 18:06

## 2025-07-25 RX ADMIN — GADOBUTROL 8 ML: 604.72 INJECTION INTRAVENOUS at 15:49

## 2025-07-25 RX ADMIN — MORPHINE SULFATE 4 MG: 4 INJECTION INTRAVENOUS at 21:14

## 2025-07-25 RX ADMIN — ONDANSETRON 4 MG: 2 INJECTION INTRAMUSCULAR; INTRAVENOUS at 14:49

## 2025-07-25 RX ADMIN — DOXEPIN HYDROCHLORIDE 100 MG: 100 CAPSULE ORAL at 21:39

## 2025-07-25 RX ADMIN — QUETIAPINE FUMARATE 100 MG: 100 TABLET ORAL at 21:22

## 2025-07-25 RX ADMIN — DEXTROSE AND SODIUM CHLORIDE 150 ML/HR: 5; .45 INJECTION, SOLUTION INTRAVENOUS at 23:13

## 2025-07-25 RX ADMIN — GABAPENTIN 800 MG: 400 CAPSULE ORAL at 21:22

## 2025-07-25 RX ADMIN — MORPHINE SULFATE 4 MG: 4 INJECTION INTRAVENOUS at 12:18

## 2025-07-25 RX ADMIN — SODIUM CHLORIDE 1000 ML: 0.9 INJECTION, SOLUTION INTRAVENOUS at 16:47

## 2025-07-25 RX ADMIN — MORPHINE SULFATE 4 MG: 4 INJECTION INTRAVENOUS at 14:49

## 2025-07-25 RX ADMIN — PANTOPRAZOLE SODIUM 40 MG: 40 INJECTION, POWDER, FOR SOLUTION INTRAVENOUS at 18:08

## 2025-07-25 RX ADMIN — MORPHINE SULFATE 4 MG: 4 INJECTION INTRAVENOUS at 17:20

## 2025-07-25 RX ADMIN — IOHEXOL 85 ML: 350 INJECTION, SOLUTION INTRAVENOUS at 12:01

## 2025-07-25 RX ADMIN — PIPERACILLIN AND TAZOBACTAM 4.5 G: 4; .5 INJECTION, POWDER, FOR SOLUTION INTRAVENOUS at 12:19

## 2025-07-25 RX ADMIN — ONDANSETRON 4 MG: 2 INJECTION INTRAMUSCULAR; INTRAVENOUS at 10:24

## 2025-07-25 RX ADMIN — SODIUM CHLORIDE 1000 ML: 0.9 INJECTION, SOLUTION INTRAVENOUS at 10:58

## 2025-07-26 VITALS
RESPIRATION RATE: 17 BRPM | SYSTOLIC BLOOD PRESSURE: 122 MMHG | TEMPERATURE: 98.8 F | OXYGEN SATURATION: 100 % | HEART RATE: 92 BPM | DIASTOLIC BLOOD PRESSURE: 81 MMHG

## 2025-07-26 LAB
ALBUMIN SERPL BCG-MCNC: 2.8 G/DL (ref 3.5–5)
ALP SERPL-CCNC: 259 U/L (ref 34–104)
ALT SERPL W P-5'-P-CCNC: 151 U/L (ref 7–52)
ANION GAP SERPL CALCULATED.3IONS-SCNC: 7 MMOL/L (ref 4–13)
AST SERPL W P-5'-P-CCNC: 260 U/L (ref 13–39)
BASOPHILS # BLD AUTO: 0.07 THOUSANDS/ÂΜL (ref 0–0.1)
BASOPHILS NFR BLD AUTO: 1 % (ref 0–1)
BILIRUB DIRECT SERPL-MCNC: 0.1 MG/DL (ref 0–0.2)
BILIRUB SERPL-MCNC: 0.53 MG/DL (ref 0.2–1)
BUN SERPL-MCNC: 5 MG/DL (ref 5–25)
CALCIUM SERPL-MCNC: 7.2 MG/DL (ref 8.4–10.2)
CHLORIDE SERPL-SCNC: 109 MMOL/L (ref 96–108)
CO2 SERPL-SCNC: 23 MMOL/L (ref 21–32)
CREAT SERPL-MCNC: 0.88 MG/DL (ref 0.6–1.3)
EOSINOPHIL # BLD AUTO: 0.27 THOUSAND/ÂΜL (ref 0–0.61)
EOSINOPHIL NFR BLD AUTO: 4 % (ref 0–6)
ERYTHROCYTE [DISTWIDTH] IN BLOOD BY AUTOMATED COUNT: 17 % (ref 11.6–15.1)
GFR SERPL CREATININE-BSD FRML MDRD: 85 ML/MIN/1.73SQ M
GLUCOSE SERPL-MCNC: 238 MG/DL (ref 65–140)
HCT VFR BLD AUTO: 33.1 % (ref 34.8–46.1)
HGB BLD-MCNC: 10.3 G/DL (ref 11.5–15.4)
IMM GRANULOCYTES # BLD AUTO: 0.02 THOUSAND/UL (ref 0–0.2)
IMM GRANULOCYTES NFR BLD AUTO: 0 % (ref 0–2)
LYMPHOCYTES # BLD AUTO: 2.02 THOUSANDS/ÂΜL (ref 0.6–4.47)
LYMPHOCYTES NFR BLD AUTO: 32 % (ref 14–44)
MAGNESIUM SERPL-MCNC: 1.9 MG/DL (ref 1.9–2.7)
MCH RBC QN AUTO: 27.5 PG (ref 26.8–34.3)
MCHC RBC AUTO-ENTMCNC: 31.1 G/DL (ref 31.4–37.4)
MCV RBC AUTO: 89 FL (ref 82–98)
MONOCYTES # BLD AUTO: 0.7 THOUSAND/ÂΜL (ref 0.17–1.22)
MONOCYTES NFR BLD AUTO: 11 % (ref 4–12)
NEUTROPHILS # BLD AUTO: 3.25 THOUSANDS/ÂΜL (ref 1.85–7.62)
NEUTS SEG NFR BLD AUTO: 52 % (ref 43–75)
NRBC BLD AUTO-RTO: 0 /100 WBCS
PHOSPHATE SERPL-MCNC: 3.1 MG/DL (ref 2.7–4.5)
PLATELET # BLD AUTO: 227 THOUSANDS/UL (ref 149–390)
PMV BLD AUTO: 8.9 FL (ref 8.9–12.7)
POTASSIUM SERPL-SCNC: 3.5 MMOL/L (ref 3.5–5.3)
PROT SERPL-MCNC: 5 G/DL (ref 6.4–8.4)
RBC # BLD AUTO: 3.74 MILLION/UL (ref 3.81–5.12)
SODIUM SERPL-SCNC: 139 MMOL/L (ref 135–147)
WBC # BLD AUTO: 6.33 THOUSAND/UL (ref 4.31–10.16)

## 2025-07-26 PROCEDURE — 80048 BASIC METABOLIC PNL TOTAL CA: CPT | Performed by: INTERNAL MEDICINE

## 2025-07-26 PROCEDURE — 96376 TX/PRO/DX INJ SAME DRUG ADON: CPT

## 2025-07-26 PROCEDURE — 80076 HEPATIC FUNCTION PANEL: CPT | Performed by: INTERNAL MEDICINE

## 2025-07-26 PROCEDURE — 99233 SBSQ HOSP IP/OBS HIGH 50: CPT | Performed by: INTERNAL MEDICINE

## 2025-07-26 PROCEDURE — 84100 ASSAY OF PHOSPHORUS: CPT | Performed by: INTERNAL MEDICINE

## 2025-07-26 PROCEDURE — 96366 THER/PROPH/DIAG IV INF ADDON: CPT

## 2025-07-26 PROCEDURE — 85025 COMPLETE CBC W/AUTO DIFF WBC: CPT | Performed by: INTERNAL MEDICINE

## 2025-07-26 PROCEDURE — 83735 ASSAY OF MAGNESIUM: CPT | Performed by: INTERNAL MEDICINE

## 2025-07-26 PROCEDURE — 36415 COLL VENOUS BLD VENIPUNCTURE: CPT | Performed by: INTERNAL MEDICINE

## 2025-07-26 PROCEDURE — 99214 OFFICE O/P EST MOD 30 MIN: CPT | Performed by: STUDENT IN AN ORGANIZED HEALTH CARE EDUCATION/TRAINING PROGRAM

## 2025-07-26 RX ORDER — OXYCODONE AND ACETAMINOPHEN 5; 325 MG/1; MG/1
1 TABLET ORAL EVERY 6 HOURS PRN
Qty: 12 TABLET | Refills: 0 | Status: SHIPPED | OUTPATIENT
Start: 2025-07-26 | End: 2025-08-05

## 2025-07-26 RX ORDER — PANTOPRAZOLE SODIUM 40 MG/1
40 TABLET, DELAYED RELEASE ORAL DAILY
Qty: 30 TABLET | Refills: 0 | Status: SHIPPED | OUTPATIENT
Start: 2025-07-26

## 2025-07-26 RX ORDER — DROPERIDOL 2.5 MG/ML
1.25 INJECTION, SOLUTION INTRAMUSCULAR; INTRAVENOUS ONCE
Status: DISCONTINUED | OUTPATIENT
Start: 2025-07-26 | End: 2025-07-26 | Stop reason: HOSPADM

## 2025-07-26 RX ORDER — ONDANSETRON 4 MG/1
4 TABLET, FILM COATED ORAL EVERY 6 HOURS
Qty: 30 TABLET | Refills: 0 | Status: SHIPPED | OUTPATIENT
Start: 2025-07-26

## 2025-07-26 RX ADMIN — QUETIAPINE FUMARATE 50 MG: 25 TABLET ORAL at 08:11

## 2025-07-26 RX ADMIN — MORPHINE SULFATE 4 MG: 4 INJECTION INTRAVENOUS at 08:15

## 2025-07-26 RX ADMIN — PANTOPRAZOLE SODIUM 40 MG: 40 INJECTION, POWDER, FOR SOLUTION INTRAVENOUS at 08:02

## 2025-07-26 RX ADMIN — DEXTROSE AND SODIUM CHLORIDE 150 ML/HR: 5; .45 INJECTION, SOLUTION INTRAVENOUS at 08:05

## 2025-07-26 RX ADMIN — MORPHINE SULFATE 4 MG: 4 INJECTION INTRAVENOUS at 12:13

## 2025-07-26 RX ADMIN — BUPROPION HYDROCHLORIDE 150 MG: 150 TABLET, EXTENDED RELEASE ORAL at 08:11

## 2025-07-26 RX ADMIN — GABAPENTIN 800 MG: 400 CAPSULE ORAL at 08:11

## 2025-07-28 ENCOUNTER — RESULTS FOLLOW-UP (OUTPATIENT)
Dept: GASTROENTEROLOGY | Facility: AMBULARY SURGERY CENTER | Age: 35
End: 2025-07-28

## 2025-07-30 LAB
BACTERIA BLD CULT: NORMAL
BACTERIA BLD CULT: NORMAL

## 2025-07-31 ENCOUNTER — HOSPITAL ENCOUNTER (EMERGENCY)
Facility: HOSPITAL | Age: 35
Discharge: HOME | End: 2025-08-01
Attending: EMERGENCY MEDICINE | Admitting: EMERGENCY MEDICINE
Payer: MEDICARE

## 2025-07-31 DIAGNOSIS — R11.2 NAUSEA AND VOMITING, UNSPECIFIED VOMITING TYPE: ICD-10-CM

## 2025-07-31 DIAGNOSIS — R10.9 ABDOMINAL PAIN, UNSPECIFIED ABDOMINAL LOCATION: Primary | ICD-10-CM

## 2025-07-31 DIAGNOSIS — F10.929 ALCOHOL INTOXICATION (HCC): Primary | ICD-10-CM

## 2025-07-31 DIAGNOSIS — K29.20 ALCOHOLIC GASTRITIS: ICD-10-CM

## 2025-07-31 LAB
ALBUMIN SERPL BCG-MCNC: 4.6 G/DL (ref 3.5–5)
ALP SERPL-CCNC: 233 U/L (ref 34–104)
ALT SERPL W P-5'-P-CCNC: 49 U/L (ref 7–52)
ANION GAP SERPL CALCULATED.3IONS-SCNC: 12 MMOL/L (ref 4–13)
APAP SERPL-MCNC: <2 UG/ML (ref 10–20)
AST SERPL W P-5'-P-CCNC: 42 U/L (ref 13–39)
BASOPHILS # BLD AUTO: 0.18 THOUSANDS/ÂΜL (ref 0–0.1)
BASOPHILS NFR BLD AUTO: 2 % (ref 0–1)
BILIRUB SERPL-MCNC: 0.36 MG/DL (ref 0.2–1)
BUN SERPL-MCNC: 6 MG/DL (ref 5–25)
CALCIUM SERPL-MCNC: 9.5 MG/DL (ref 8.4–10.2)
CARDIAC TROPONIN I PNL SERPL HS: <2 NG/L (ref ?–50)
CHLORIDE SERPL-SCNC: 105 MMOL/L (ref 96–108)
CO2 SERPL-SCNC: 26 MMOL/L (ref 21–32)
CREAT SERPL-MCNC: 1.04 MG/DL (ref 0.6–1.3)
EOSINOPHIL # BLD AUTO: 0.22 THOUSAND/ÂΜL (ref 0–0.61)
EOSINOPHIL NFR BLD AUTO: 2 % (ref 0–6)
ERYTHROCYTE [DISTWIDTH] IN BLOOD BY AUTOMATED COUNT: 17.7 % (ref 11.6–15.1)
ETHANOL SERPL-MCNC: 286 MG/DL
GFR SERPL CREATININE-BSD FRML MDRD: 69 ML/MIN/1.73SQ M
GLUCOSE SERPL-MCNC: 89 MG/DL (ref 65–140)
HCT VFR BLD AUTO: 39.8 % (ref 34.8–46.1)
HGB BLD-MCNC: 12.7 G/DL (ref 11.5–15.4)
IMM GRANULOCYTES # BLD AUTO: 0.05 THOUSAND/UL (ref 0–0.2)
IMM GRANULOCYTES NFR BLD AUTO: 1 % (ref 0–2)
LIPASE SERPL-CCNC: 173 U/L (ref 11–82)
LYMPHOCYTES # BLD AUTO: 2.69 THOUSANDS/ÂΜL (ref 0.6–4.47)
LYMPHOCYTES NFR BLD AUTO: 26 % (ref 14–44)
MCH RBC QN AUTO: 27.4 PG (ref 26.8–34.3)
MCHC RBC AUTO-ENTMCNC: 31.9 G/DL (ref 31.4–37.4)
MCV RBC AUTO: 86 FL (ref 82–98)
MONOCYTES # BLD AUTO: 0.55 THOUSAND/ÂΜL (ref 0.17–1.22)
MONOCYTES NFR BLD AUTO: 5 % (ref 4–12)
NEUTROPHILS # BLD AUTO: 6.76 THOUSANDS/ÂΜL (ref 1.85–7.62)
NEUTS SEG NFR BLD AUTO: 64 % (ref 43–75)
NRBC BLD AUTO-RTO: 0 /100 WBCS
PLATELET # BLD AUTO: 368 THOUSANDS/UL (ref 149–390)
PMV BLD AUTO: 8.3 FL (ref 8.9–12.7)
POTASSIUM SERPL-SCNC: 4.1 MMOL/L (ref 3.5–5.3)
PROT SERPL-MCNC: 8.1 G/DL (ref 6.4–8.4)
RBC # BLD AUTO: 4.64 MILLION/UL (ref 3.81–5.12)
SALICYLATES SERPL-MCNC: <5 MG/DL (ref 5–20)
SODIUM SERPL-SCNC: 143 MMOL/L (ref 135–147)
WBC # BLD AUTO: 10.45 THOUSAND/UL (ref 4.31–10.16)

## 2025-07-31 PROCEDURE — 82077 ASSAY SPEC XCP UR&BREATH IA: CPT | Performed by: EMERGENCY MEDICINE

## 2025-07-31 PROCEDURE — 83690 ASSAY OF LIPASE: CPT | Performed by: EMERGENCY MEDICINE

## 2025-07-31 PROCEDURE — 96375 TX/PRO/DX INJ NEW DRUG ADDON: CPT

## 2025-07-31 PROCEDURE — 99284 EMERGENCY DEPT VISIT MOD MDM: CPT

## 2025-07-31 PROCEDURE — 80143 DRUG ASSAY ACETAMINOPHEN: CPT | Performed by: EMERGENCY MEDICINE

## 2025-07-31 PROCEDURE — 83735 ASSAY OF MAGNESIUM: CPT | Performed by: EMERGENCY MEDICINE

## 2025-07-31 PROCEDURE — 85025 COMPLETE CBC W/AUTO DIFF WBC: CPT | Performed by: EMERGENCY MEDICINE

## 2025-07-31 PROCEDURE — 84484 ASSAY OF TROPONIN QUANT: CPT | Performed by: EMERGENCY MEDICINE

## 2025-07-31 PROCEDURE — 96361 HYDRATE IV INFUSION ADD-ON: CPT

## 2025-07-31 PROCEDURE — 99285 EMERGENCY DEPT VISIT HI MDM: CPT | Performed by: EMERGENCY MEDICINE

## 2025-07-31 PROCEDURE — 36415 COLL VENOUS BLD VENIPUNCTURE: CPT | Performed by: EMERGENCY MEDICINE

## 2025-07-31 PROCEDURE — 96374 THER/PROPH/DIAG INJ IV PUSH: CPT

## 2025-07-31 PROCEDURE — 80179 DRUG ASSAY SALICYLATE: CPT | Performed by: EMERGENCY MEDICINE

## 2025-07-31 PROCEDURE — 93005 ELECTROCARDIOGRAM TRACING: CPT

## 2025-07-31 PROCEDURE — 80053 COMPREHEN METABOLIC PANEL: CPT | Performed by: EMERGENCY MEDICINE

## 2025-07-31 RX ORDER — PROMETHAZINE HYDROCHLORIDE 25 MG/ML
12.5 INJECTION, SOLUTION INTRAMUSCULAR; INTRAVENOUS ONCE
Status: COMPLETED | OUTPATIENT
Start: 2025-07-31 | End: 2025-07-31

## 2025-07-31 RX ORDER — SUCRALFATE 1 G/1
1 TABLET ORAL 4 TIMES DAILY
Qty: 20 TABLET | Refills: 0 | Status: SHIPPED | OUTPATIENT
Start: 2025-07-31 | End: 2025-07-31

## 2025-07-31 RX ORDER — SUCRALFATE 1 G/1
1 TABLET ORAL 4 TIMES DAILY
Qty: 20 TABLET | Refills: 0 | Status: SHIPPED | OUTPATIENT
Start: 2025-07-31

## 2025-07-31 RX ORDER — SUCRALFATE 1 G/1
1 TABLET ORAL ONCE
Status: COMPLETED | OUTPATIENT
Start: 2025-07-31 | End: 2025-07-31

## 2025-07-31 RX ORDER — LIDOCAINE HYDROCHLORIDE 20 MG/ML
15 SOLUTION OROPHARYNGEAL ONCE
Status: COMPLETED | OUTPATIENT
Start: 2025-07-31 | End: 2025-07-31

## 2025-07-31 RX ORDER — PANTOPRAZOLE SODIUM 40 MG/10ML
40 INJECTION, POWDER, LYOPHILIZED, FOR SOLUTION INTRAVENOUS ONCE
Status: COMPLETED | OUTPATIENT
Start: 2025-07-31 | End: 2025-07-31

## 2025-07-31 RX ORDER — ONDANSETRON 2 MG/ML
4 INJECTION INTRAMUSCULAR; INTRAVENOUS ONCE
Status: COMPLETED | OUTPATIENT
Start: 2025-07-31 | End: 2025-07-31

## 2025-07-31 RX ORDER — ACETAMINOPHEN 10 MG/ML
1000 INJECTION, SOLUTION INTRAVENOUS ONCE
Status: COMPLETED | OUTPATIENT
Start: 2025-07-31 | End: 2025-07-31

## 2025-07-31 RX ORDER — MAGNESIUM HYDROXIDE/ALUMINUM HYDROXICE/SIMETHICONE 120; 1200; 1200 MG/30ML; MG/30ML; MG/30ML
30 SUSPENSION ORAL ONCE
Status: COMPLETED | OUTPATIENT
Start: 2025-07-31 | End: 2025-07-31

## 2025-07-31 RX ADMIN — LIDOCAINE HYDROCHLORIDE 15 ML: 20 SOLUTION ORAL at 22:11

## 2025-07-31 RX ADMIN — ALUMINUM HYDROXIDE, MAGNESIUM HYDROXIDE, AND DIMETHICONE 30 ML: 200; 20; 200 SUSPENSION ORAL at 22:11

## 2025-07-31 RX ADMIN — SODIUM CHLORIDE 1000 ML: 0.9 INJECTION, SOLUTION INTRAVENOUS at 23:37

## 2025-07-31 RX ADMIN — ONDANSETRON 4 MG: 2 INJECTION INTRAMUSCULAR; INTRAVENOUS at 21:16

## 2025-07-31 RX ADMIN — SUCRALFATE 1 G: 1 TABLET ORAL at 23:18

## 2025-07-31 RX ADMIN — PROMETHAZINE HYDROCHLORIDE 12.5 MG: 25 INJECTION INTRAMUSCULAR; INTRAVENOUS at 22:11

## 2025-07-31 RX ADMIN — PANTOPRAZOLE SODIUM 40 MG: 40 INJECTION, POWDER, FOR SOLUTION INTRAVENOUS at 21:16

## 2025-07-31 RX ADMIN — ACETAMINOPHEN 1000 MG: 10 INJECTION, SOLUTION INTRAVENOUS at 22:11

## 2025-07-31 RX ADMIN — MORPHINE SULFATE 2 MG: 2 INJECTION, SOLUTION INTRAMUSCULAR; INTRAVENOUS at 21:17

## 2025-08-01 ENCOUNTER — TELEPHONE (OUTPATIENT)
Age: 35
End: 2025-08-01

## 2025-08-01 VITALS
HEART RATE: 82 BPM | DIASTOLIC BLOOD PRESSURE: 79 MMHG | RESPIRATION RATE: 18 BRPM | OXYGEN SATURATION: 99 % | SYSTOLIC BLOOD PRESSURE: 111 MMHG | TEMPERATURE: 97.9 F

## 2025-08-01 LAB
ATRIAL RATE: 105 BPM
ATRIAL RATE: 96 BPM
EXT PREGNANCY TEST URINE: NEGATIVE
EXT. CONTROL: NORMAL
MAGNESIUM SERPL-MCNC: 2.6 MG/DL (ref 1.9–2.7)
P AXIS: 60 DEGREES
P AXIS: 67 DEGREES
PR INTERVAL: 142 MS
PR INTERVAL: 148 MS
QRS AXIS: 30 DEGREES
QRS AXIS: 32 DEGREES
QRSD INTERVAL: 78 MS
QRSD INTERVAL: 82 MS
QT INTERVAL: 330 MS
QT INTERVAL: 360 MS
QTC INTERVAL: 436 MS
QTC INTERVAL: 454 MS
T WAVE AXIS: 50 DEGREES
T WAVE AXIS: 53 DEGREES
VENTRICULAR RATE: 105 BPM
VENTRICULAR RATE: 96 BPM

## 2025-08-01 PROCEDURE — 81025 URINE PREGNANCY TEST: CPT | Performed by: EMERGENCY MEDICINE

## 2025-08-01 PROCEDURE — 93010 ELECTROCARDIOGRAM REPORT: CPT | Performed by: INTERNAL MEDICINE

## 2025-08-14 ENCOUNTER — APPOINTMENT (EMERGENCY)
Dept: CT IMAGING | Facility: HOSPITAL | Age: 35
End: 2025-08-14
Payer: MEDICARE

## 2025-08-14 ENCOUNTER — HOSPITAL ENCOUNTER (EMERGENCY)
Facility: HOSPITAL | Age: 35
End: 2025-08-15
Attending: EMERGENCY MEDICINE | Admitting: EMERGENCY MEDICINE
Payer: MEDICARE

## 2025-08-14 PROBLEM — Z71.85 COUNSELING FOR HPV (HUMAN PAPILLOMAVIRUS) VACCINATION: Status: RESOLVED | Noted: 2021-08-06 | Resolved: 2025-08-14

## 2025-08-14 PROBLEM — Z3A.39 39 WEEKS GESTATION OF PREGNANCY: Status: RESOLVED | Noted: 2019-07-28 | Resolved: 2025-08-14

## 2025-08-14 PROBLEM — R03.0 ELEVATED BLOOD PRESSURE READING: Status: RESOLVED | Noted: 2022-05-20 | Resolved: 2025-08-14

## 2025-08-14 PROBLEM — Z87.42 HISTORY OF ABNORMAL CERVICAL PAPANICOLAOU SMEAR: Status: RESOLVED | Noted: 2019-03-12 | Resolved: 2025-08-14

## 2025-08-14 PROBLEM — S09.93XA: Status: RESOLVED | Noted: 2025-07-18 | Resolved: 2025-08-14

## 2025-08-14 PROBLEM — R10.13 EPIGASTRIC PAIN: Status: RESOLVED | Noted: 2025-07-14 | Resolved: 2025-08-14

## 2025-08-14 PROBLEM — R87.611 PAP SMEAR OF CERVIX WITH ASCUS, CANNOT EXCLUDE HGSIL: Status: RESOLVED | Noted: 2019-03-27 | Resolved: 2025-08-14

## 2025-08-14 PROBLEM — L72.9 SUBCUTANEOUS CYST: Status: RESOLVED | Noted: 2019-09-08 | Resolved: 2025-08-14

## 2025-08-14 PROBLEM — R10.84 GENERALIZED ABDOMINAL PAIN: Status: RESOLVED | Noted: 2025-07-25 | Resolved: 2025-08-14

## 2025-08-16 ENCOUNTER — HOSPITAL ENCOUNTER (EMERGENCY)
Facility: HOSPITAL | Age: 35
Discharge: HOME | End: 2025-08-17
Attending: EMERGENCY MEDICINE | Admitting: EMERGENCY MEDICINE
Payer: MEDICARE

## 2025-08-16 DIAGNOSIS — R10.9 ABDOMINAL PAIN: Primary | ICD-10-CM

## 2025-08-16 DIAGNOSIS — K85.90 PANCREATITIS: ICD-10-CM

## 2025-08-16 PROBLEM — F10.939 ALCOHOL WITHDRAWAL SYNDROME WITH COMPLICATION (HCC): Status: ACTIVE | Noted: 2025-08-16

## 2025-08-16 PROBLEM — F19.90 SUBSTANCE USE DISORDER: Status: ACTIVE | Noted: 2025-08-16

## 2025-08-16 PROBLEM — E87.20 LACTIC ACIDOSIS: Status: RESOLVED | Noted: 2025-07-25 | Resolved: 2025-08-16

## 2025-08-16 LAB
ALBUMIN SERPL BCG-MCNC: 4 G/DL (ref 3.5–5)
ALP SERPL-CCNC: 132 U/L (ref 34–104)
ALT SERPL W P-5'-P-CCNC: 17 U/L (ref 7–52)
AMPHETAMINES SERPL QL SCN: NEGATIVE
ANION GAP SERPL CALCULATED.3IONS-SCNC: 8 MMOL/L (ref 4–13)
APAP SERPL-MCNC: <2 UG/ML (ref 10–20)
AST SERPL W P-5'-P-CCNC: 29 U/L (ref 13–39)
BACTERIA UR QL AUTO: ABNORMAL /HPF
BARBITURATES UR QL: NEGATIVE
BASE EX.OXY STD BLDV CALC-SCNC: 55.9 % (ref 60–80)
BASE EXCESS BLDV CALC-SCNC: 0.1 MMOL/L
BASOPHILS # BLD AUTO: 0.05 THOUSANDS/ÂΜL (ref 0–0.1)
BASOPHILS NFR BLD AUTO: 1 % (ref 0–1)
BENZODIAZ UR QL: NEGATIVE
BILIRUB SERPL-MCNC: 0.29 MG/DL (ref 0.2–1)
BILIRUB UR QL STRIP: NEGATIVE
BUN SERPL-MCNC: 5 MG/DL (ref 5–25)
CALCIUM SERPL-MCNC: 9.2 MG/DL (ref 8.4–10.2)
CHLORIDE SERPL-SCNC: 107 MMOL/L (ref 96–108)
CLARITY UR: ABNORMAL
CO2 SERPL-SCNC: 26 MMOL/L (ref 21–32)
COCAINE UR QL: NEGATIVE
COLOR UR: COLORLESS
CREAT SERPL-MCNC: 1.12 MG/DL (ref 0.6–1.3)
EOSINOPHIL # BLD AUTO: 0.19 THOUSAND/ÂΜL (ref 0–0.61)
EOSINOPHIL NFR BLD AUTO: 2 % (ref 0–6)
ERYTHROCYTE [DISTWIDTH] IN BLOOD BY AUTOMATED COUNT: 16.2 % (ref 11.6–15.1)
ETHANOL SERPL-MCNC: 146 MG/DL
FENTANYL UR QL SCN: NEGATIVE
GFR SERPL CREATININE-BSD FRML MDRD: 63 ML/MIN/1.73SQ M
GLUCOSE SERPL-MCNC: 92 MG/DL (ref 65–140)
GLUCOSE UR STRIP-MCNC: NEGATIVE MG/DL
HCO3 BLDV-SCNC: 24.6 MMOL/L (ref 24–30)
HCT VFR BLD AUTO: 36.1 % (ref 34.8–46.1)
HGB BLD-MCNC: 11.8 G/DL (ref 11.5–15.4)
HGB UR QL STRIP.AUTO: ABNORMAL
HYDROCODONE UR QL SCN: NEGATIVE
IMM GRANULOCYTES # BLD AUTO: 0.02 THOUSAND/UL (ref 0–0.2)
IMM GRANULOCYTES NFR BLD AUTO: 0 % (ref 0–2)
KETONES UR STRIP-MCNC: NEGATIVE MG/DL
LEUKOCYTE ESTERASE UR QL STRIP: ABNORMAL
LIPASE SERPL-CCNC: 183 U/L (ref 11–82)
LYMPHOCYTES # BLD AUTO: 2.61 THOUSANDS/ÂΜL (ref 0.6–4.47)
LYMPHOCYTES NFR BLD AUTO: 33 % (ref 14–44)
MCH RBC QN AUTO: 28.4 PG (ref 26.8–34.3)
MCHC RBC AUTO-ENTMCNC: 32.7 G/DL (ref 31.4–37.4)
MCV RBC AUTO: 87 FL (ref 82–98)
METHADONE UR QL: NEGATIVE
MONOCYTES # BLD AUTO: 0.55 THOUSAND/ÂΜL (ref 0.17–1.22)
MONOCYTES NFR BLD AUTO: 7 % (ref 4–12)
NEUTROPHILS # BLD AUTO: 4.61 THOUSANDS/ÂΜL (ref 1.85–7.62)
NEUTS SEG NFR BLD AUTO: 57 % (ref 43–75)
NITRITE UR QL STRIP: NEGATIVE
NON-SQ EPI CELLS URNS QL MICRO: ABNORMAL /HPF
NRBC BLD AUTO-RTO: 0 /100 WBCS
O2 CT BLDV-SCNC: 9.7 ML/DL
OPIATES UR QL SCN: NEGATIVE
OXYCODONE+OXYMORPHONE UR QL SCN: NEGATIVE
PCO2 BLDV: 39.3 MM HG (ref 42–50)
PCP UR QL: NEGATIVE
PH BLDV: 7.41 [PH] (ref 7.3–7.4)
PH UR STRIP.AUTO: 5.5 [PH]
PLATELET # BLD AUTO: 203 THOUSANDS/UL (ref 149–390)
PMV BLD AUTO: 9.4 FL (ref 8.9–12.7)
PO2 BLDV: 30.8 MM HG (ref 35–45)
POTASSIUM SERPL-SCNC: 4.7 MMOL/L (ref 3.5–5.3)
PROT SERPL-MCNC: 6.7 G/DL (ref 6.4–8.4)
PROT UR STRIP-MCNC: NEGATIVE MG/DL
RBC # BLD AUTO: 4.16 MILLION/UL (ref 3.81–5.12)
RBC #/AREA URNS AUTO: ABNORMAL /HPF
SALICYLATES SERPL-MCNC: <5 MG/DL (ref 5–20)
SODIUM SERPL-SCNC: 141 MMOL/L (ref 135–147)
SP GR UR STRIP.AUTO: 1 (ref 1–1.03)
THC UR QL: NEGATIVE
UROBILINOGEN UR STRIP-ACNC: <2 MG/DL
WBC # BLD AUTO: 8.03 THOUSAND/UL (ref 4.31–10.16)
WBC #/AREA URNS AUTO: ABNORMAL /HPF

## 2025-08-16 PROCEDURE — 81001 URINALYSIS AUTO W/SCOPE: CPT | Performed by: EMERGENCY MEDICINE

## 2025-08-16 PROCEDURE — 82077 ASSAY SPEC XCP UR&BREATH IA: CPT | Performed by: EMERGENCY MEDICINE

## 2025-08-16 PROCEDURE — 82805 BLOOD GASES W/O2 SATURATION: CPT | Performed by: EMERGENCY MEDICINE

## 2025-08-16 PROCEDURE — 80053 COMPREHEN METABOLIC PANEL: CPT | Performed by: EMERGENCY MEDICINE

## 2025-08-16 PROCEDURE — 99284 EMERGENCY DEPT VISIT MOD MDM: CPT

## 2025-08-16 PROCEDURE — 80307 DRUG TEST PRSMV CHEM ANLYZR: CPT | Performed by: EMERGENCY MEDICINE

## 2025-08-16 PROCEDURE — 36415 COLL VENOUS BLD VENIPUNCTURE: CPT | Performed by: EMERGENCY MEDICINE

## 2025-08-16 PROCEDURE — 87186 SC STD MICRODIL/AGAR DIL: CPT | Performed by: EMERGENCY MEDICINE

## 2025-08-16 PROCEDURE — 85025 COMPLETE CBC W/AUTO DIFF WBC: CPT | Performed by: EMERGENCY MEDICINE

## 2025-08-16 PROCEDURE — 96374 THER/PROPH/DIAG INJ IV PUSH: CPT

## 2025-08-16 PROCEDURE — 87077 CULTURE AEROBIC IDENTIFY: CPT | Performed by: EMERGENCY MEDICINE

## 2025-08-16 PROCEDURE — 80143 DRUG ASSAY ACETAMINOPHEN: CPT | Performed by: EMERGENCY MEDICINE

## 2025-08-16 PROCEDURE — 81025 URINE PREGNANCY TEST: CPT | Performed by: EMERGENCY MEDICINE

## 2025-08-16 PROCEDURE — 96361 HYDRATE IV INFUSION ADD-ON: CPT

## 2025-08-16 PROCEDURE — 96375 TX/PRO/DX INJ NEW DRUG ADDON: CPT

## 2025-08-16 PROCEDURE — 80179 DRUG ASSAY SALICYLATE: CPT | Performed by: EMERGENCY MEDICINE

## 2025-08-16 PROCEDURE — 87086 URINE CULTURE/COLONY COUNT: CPT | Performed by: EMERGENCY MEDICINE

## 2025-08-16 PROCEDURE — 83690 ASSAY OF LIPASE: CPT | Performed by: EMERGENCY MEDICINE

## 2025-08-16 RX ORDER — FENTANYL CITRATE 50 UG/ML
75 INJECTION, SOLUTION INTRAMUSCULAR; INTRAVENOUS ONCE
Refills: 0 | Status: COMPLETED | OUTPATIENT
Start: 2025-08-16 | End: 2025-08-16

## 2025-08-16 RX ORDER — ONDANSETRON 2 MG/ML
4 INJECTION INTRAMUSCULAR; INTRAVENOUS ONCE
Status: COMPLETED | OUTPATIENT
Start: 2025-08-16 | End: 2025-08-16

## 2025-08-16 RX ADMIN — ONDANSETRON 4 MG: 2 INJECTION INTRAMUSCULAR; INTRAVENOUS at 23:23

## 2025-08-16 RX ADMIN — SODIUM CHLORIDE 1000 ML: 0.9 INJECTION, SOLUTION INTRAVENOUS at 23:23

## 2025-08-16 RX ADMIN — FENTANYL CITRATE 75 MCG: 50 INJECTION INTRAMUSCULAR; INTRAVENOUS at 23:24

## 2025-08-17 ENCOUNTER — APPOINTMENT (EMERGENCY)
Dept: CT IMAGING | Facility: HOSPITAL | Age: 35
End: 2025-08-17
Payer: MEDICARE

## 2025-08-17 VITALS
TEMPERATURE: 97.4 F | DIASTOLIC BLOOD PRESSURE: 74 MMHG | SYSTOLIC BLOOD PRESSURE: 134 MMHG | HEART RATE: 87 BPM | RESPIRATION RATE: 18 BRPM | OXYGEN SATURATION: 99 %

## 2025-08-17 LAB
ATRIAL RATE: 73 BPM
P AXIS: 37 DEGREES
PR INTERVAL: 156 MS
QRS AXIS: 26 DEGREES
QRSD INTERVAL: 72 MS
QT INTERVAL: 390 MS
QTC INTERVAL: 429 MS
T WAVE AXIS: 26 DEGREES
VENTRICULAR RATE: 73 BPM

## 2025-08-17 PROCEDURE — 93005 ELECTROCARDIOGRAM TRACING: CPT

## 2025-08-17 PROCEDURE — 96375 TX/PRO/DX INJ NEW DRUG ADDON: CPT

## 2025-08-17 PROCEDURE — 96376 TX/PRO/DX INJ SAME DRUG ADON: CPT

## 2025-08-17 PROCEDURE — 74177 CT ABD & PELVIS W/CONTRAST: CPT

## 2025-08-17 PROCEDURE — 93010 ELECTROCARDIOGRAM REPORT: CPT | Performed by: INTERNAL MEDICINE

## 2025-08-17 PROCEDURE — 99285 EMERGENCY DEPT VISIT HI MDM: CPT | Performed by: EMERGENCY MEDICINE

## 2025-08-17 PROCEDURE — 96361 HYDRATE IV INFUSION ADD-ON: CPT

## 2025-08-17 RX ORDER — PROMETHAZINE HYDROCHLORIDE 25 MG/ML
25 INJECTION, SOLUTION INTRAMUSCULAR; INTRAVENOUS ONCE
Status: COMPLETED | OUTPATIENT
Start: 2025-08-17 | End: 2025-08-17

## 2025-08-17 RX ORDER — PROMETHAZINE HYDROCHLORIDE 25 MG/1
25 TABLET ORAL EVERY 6 HOURS PRN
Qty: 30 TABLET | Refills: 0 | Status: SHIPPED | OUTPATIENT
Start: 2025-08-17

## 2025-08-17 RX ORDER — ACETAMINOPHEN 500 MG
500 TABLET ORAL EVERY 6 HOURS PRN
Qty: 30 TABLET | Refills: 0 | Status: SHIPPED | OUTPATIENT
Start: 2025-08-17

## 2025-08-17 RX ORDER — FENTANYL CITRATE 50 UG/ML
75 INJECTION, SOLUTION INTRAMUSCULAR; INTRAVENOUS ONCE
Refills: 0 | Status: COMPLETED | OUTPATIENT
Start: 2025-08-17 | End: 2025-08-17

## 2025-08-17 RX ADMIN — PROMETHAZINE HYDROCHLORIDE 25 MG: 25 INJECTION INTRAMUSCULAR; INTRAVENOUS at 00:44

## 2025-08-17 RX ADMIN — IOHEXOL 100 ML: 350 INJECTION, SOLUTION INTRAVENOUS at 01:07

## 2025-08-17 RX ADMIN — FENTANYL CITRATE 75 MCG: 50 INJECTION INTRAMUSCULAR; INTRAVENOUS at 00:44

## 2025-08-18 ENCOUNTER — TRANSITIONAL CARE MANAGEMENT (OUTPATIENT)
Dept: FAMILY MEDICINE CLINIC | Facility: CLINIC | Age: 35
End: 2025-08-18

## 2025-08-18 DIAGNOSIS — F10.90 ALCOHOL USE DISORDER: ICD-10-CM

## 2025-08-18 DIAGNOSIS — Z59.82 TRANSPORTATION INSECURITY: Primary | ICD-10-CM

## 2025-08-18 SDOH — ECONOMIC STABILITY - TRANSPORTATION SECURITY: TRANSPORTATION INSECURITY: Z59.82

## 2025-08-19 LAB — BACTERIA UR CULT: ABNORMAL

## 2025-08-20 ENCOUNTER — PATIENT OUTREACH (OUTPATIENT)
Dept: FAMILY MEDICINE CLINIC | Facility: CLINIC | Age: 35
End: 2025-08-20

## 2025-08-21 ENCOUNTER — PATIENT OUTREACH (OUTPATIENT)
Dept: FAMILY MEDICINE CLINIC | Facility: CLINIC | Age: 35
End: 2025-08-21

## (undated) DEVICE — Device

## (undated) DEVICE — TROCAR: Brand: KII FIOS FIRST ENTRY

## (undated) DEVICE — MEDIUM-LARGE CLIP APPLIER: Brand: ENDOWRIST

## (undated) DEVICE — LAPAROTOMY DRAPE WITH POUCHES: Brand: CONVERTORS

## (undated) DEVICE — INTENDED FOR TISSUE SEPARATION, AND OTHER PROCEDURES THAT REQUIRE A SHARP SURGICAL BLADE TO PUNCTURE OR CUT.: Brand: BARD-PARKER SAFETY BLADES SIZE 15, STERILE

## (undated) DEVICE — REM POLYHESIVE ADULT PATIENT RETURN ELECTRODE: Brand: VALLEYLAB

## (undated) DEVICE — NEPTUNE E-SEP SMOKE EVACUATION PENCIL, COATED, 70MM BLADE, PUSH BUTTON SWITCH: Brand: NEPTUNE E-SEP

## (undated) DEVICE — [HIGH FLOW INSUFFLATOR,  DO NOT USE IF PACKAGE IS DAMAGED,  KEEP DRY,  KEEP AWAY FROM SUNLIGHT,  PROTECT FROM HEAT AND RADIOACTIVE SOURCES.]: Brand: PNEUMOSURE

## (undated) DEVICE — SUT VICRYL PLUS 0 UR-6 27IN VCP603H

## (undated) DEVICE — FORCE BIPOLAR: Brand: ENDOWRIST

## (undated) DEVICE — CANNULA SEAL

## (undated) DEVICE — MARYLAND BIPOLAR FORCEPS: Brand: ENDOWRIST

## (undated) DEVICE — MAYO STAND COVER: Brand: CONVERTORS

## (undated) DEVICE — PACK PBDS LAP CHOLE RF

## (undated) DEVICE — VISUALIZATION SYSTEM: Brand: CLEARIFY

## (undated) DEVICE — COLUMN DRAPE

## (undated) DEVICE — ARM DRAPE

## (undated) DEVICE — BLADELESS OBTURATOR: Brand: WECK VISTA

## (undated) DEVICE — PERMANENT CAUTERY HOOK: Brand: ENDOWRIST

## (undated) DEVICE — MICRO HVTSA, 0.5G AND HVTSA SOURCEMARK PRODUCT CODE M1206 AND M1206-01: Brand: EXOFIN MICRO HVTSA, 0.5G

## (undated) DEVICE — PROGRASP FORCEPS: Brand: ENDOWRIST

## (undated) DEVICE — TISSUE RETRIEVAL SYSTEM: Brand: INZII RETRIEVAL SYSTEM